# Patient Record
Sex: FEMALE | Race: WHITE | NOT HISPANIC OR LATINO | Employment: FULL TIME | URBAN - METROPOLITAN AREA
[De-identification: names, ages, dates, MRNs, and addresses within clinical notes are randomized per-mention and may not be internally consistent; named-entity substitution may affect disease eponyms.]

---

## 2018-03-07 ENCOUNTER — OFFICE VISIT (OUTPATIENT)
Dept: OBGYN | Facility: CLINIC | Age: 38
End: 2018-03-07
Payer: COMMERCIAL

## 2018-03-07 VITALS
HEIGHT: 64 IN | WEIGHT: 163.3 LBS | SYSTOLIC BLOOD PRESSURE: 101 MMHG | DIASTOLIC BLOOD PRESSURE: 70 MMHG | HEART RATE: 72 BPM | BODY MASS INDEX: 27.88 KG/M2

## 2018-03-07 DIAGNOSIS — Z31.69 ENCOUNTER FOR PRECONCEPTION CONSULTATION: Primary | ICD-10-CM

## 2018-03-07 PROBLEM — L40.9 PSORIASIS: Status: ACTIVE | Noted: 2018-03-07

## 2018-03-07 PROCEDURE — 25000128 H RX IP 250 OP 636: Mod: ZF

## 2018-03-07 PROCEDURE — 86787 VARICELLA-ZOSTER ANTIBODY: CPT | Performed by: OBSTETRICS & GYNECOLOGY

## 2018-03-07 PROCEDURE — 36415 COLL VENOUS BLD VENIPUNCTURE: CPT | Performed by: OBSTETRICS & GYNECOLOGY

## 2018-03-07 PROCEDURE — G0008 ADMIN INFLUENZA VIRUS VAC: HCPCS | Mod: ZF

## 2018-03-07 PROCEDURE — 90686 IIV4 VACC NO PRSV 0.5 ML IM: CPT | Mod: ZF

## 2018-03-07 PROCEDURE — 86762 RUBELLA ANTIBODY: CPT | Performed by: OBSTETRICS & GYNECOLOGY

## 2018-03-07 PROCEDURE — G0463 HOSPITAL OUTPT CLINIC VISIT: HCPCS | Mod: ZF

## 2018-03-07 RX ORDER — IBUPROFEN 200 MG
200 TABLET ORAL
COMMUNITY
Start: 2016-03-31 | End: 2018-10-30

## 2018-03-07 RX ORDER — PIMECROLIMUS 10 MG/G
CREAM TOPICAL
COMMUNITY
Start: 2017-08-10 | End: 2018-10-30

## 2018-03-07 NOTE — NURSING NOTE
Ana Cleveland      1.  Has the patient received the information for the influenza vaccine? YES    2.  Does the patient have any of the following contraindications?     Allergy to eggs? No     Allergic reaction to previous influenza vaccines? No     Any other problems to previous influenza vaccines? No     Paralyzed by Guillain-[place] syndrome? No     Currently pregnant? NO     Current moderate or severe illness? No     Allergy to contact lens solution? No    3.  The vaccine has been administered in the usual fashion and the patient was instructed to wait 20 minutes before leaving the building in the event of an allergic reaction: YES    Vaccination given by See BUCK Ortiz.  Recorded by See Angel

## 2018-03-07 NOTE — NURSING NOTE
Chief Complaint   Patient presents with     Consult For     Pre conception counseling       See BUCK Ortiz 3/7/2018

## 2018-03-07 NOTE — PATIENT INSTRUCTIONS
Can continue to use Cerave    Recommend lab testing for rubella and chicken pox testing    Return within 6 months if not pregnant, Otherwise, call clinic to schedule intake appointment with a positive pregnancy test (~7-8 weeks )

## 2018-03-07 NOTE — MR AVS SNAPSHOT
"              After Visit Summary   3/7/2018    Ana Cleveland    MRN: [id number]           Patient Information     Date Of Birth          1980        Visit Information        Provider Department      3/7/2018 1:30 PM Shanwa Rock MD [facility]        Today's Diagnoses     Encounter for preconception consultation    -  1      Care Instructions    Can continue to use Cerave    Recommend lab testing for rubella and chicken pox testing    Return within 6 months if not pregnant, Otherwise, call clinic to schedule intake appointment with a positive pregnancy test (~7-8 weeks )          Follow-ups after your visit        Who to contact     Please call your clinic at [phone] to:    Ask questions about your health    Make or cancel appointments    Discuss your medicines    Learn about your test results    Speak to your doctor            Additional Information About Your Visit        MyChart Information     CumuLogic is an electronic gateway that provides easy, online access to your medical records. With CumuLogic, you can request a clinic appointment, read your test results, renew a prescription or communicate with your care team.     To sign up for Mundit visit the website at [email or web address]   You will be asked to enter the access code listed below, as well as some personal information. Please follow the directions to create your username and password.     Your access code is: [email or web address]  Expires: 2018  6:30 AM     Your access code will  in 90 days. If you need help or a new code, please contact your [facility] or call [phone] for assistance.        Care EveryWhere ID     This is your Care EveryWhere ID. This could be used by other organizations to access your [facility] medical records  [id number]        Your Vitals Were     Pulse Height Last Period Breastfeeding? BMI (Body Mass Index)       72 1.626 m (5' 4\") 2018 " (Exact Date) No 28.03 kg/m2        Blood Pressure from Last 3 Encounters:   03/07/18 101/70    Weight from Last 3 Encounters:   03/07/18 74.1 kg (163 lb 4.8 oz)              We Performed the Following     Rubella Antibody IgG Quantitative     Varicella Zoster Virus Antibody IgG        Primary Care Provider    None Specified       No primary provider on file.        Equal Access to Services     [facility] : Hadii aad ku hadbonnieo Soangelaali, waaxda luqadaha, qaybta kaalmada stephane, shailesh justinjhonstephany jerez . So [facility] [phone].    ATENCIÓN: Si habla español, tiene a hoang disposición servicios gratuitos de asistencia lingüística. Llame al [phone].    We comply with applicable federal civil rights laws and [place] laws. We do not discriminate on the basis of race, color, national origin, age, disability, sex, sexual orientation, or gender identity.            Thank you!     Thank you for choosing [facility]  for your care. Our goal is always to provide you with excellent care. Hearing back from our patients is one way we can continue to improve our services. Please take a few minutes to complete the written survey that you may receive in the mail after your visit with us. Thank you!             Your Updated Medication List - Protect others around you: Learn how to safely use, store and throw away your medicines at www.disposemymeds.org.          This list is accurate as of 3/7/18  2:15 PM.  Always use your most recent med list.                   Brand Name Dispense Instructions for use Diagnosis    CERAVE Oint           ibuprofen 200 MG tablet    ADVIL/MOTRIN     Take 200 mg by mouth        pimecrolimus 1 % cream    ELIDEL

## 2018-03-07 NOTE — PROGRESS NOTES
"Preconception counseling visit    Patient name: Ana Cleveland  MRN: [id number]  : 1980    CC: Pre conception discussion    HPI: Ana Cleveland is a 37 year old female G0 who presents for discussion of preconception counseling. She and her current partner, her , have been using the pull out method for the years they have been together. They decided approximately 1.5 weeks ago that they would try for pregnancy.     She otherwise feels well. She notes a medical history that includes psoriasis. She was on medications for it, but is now using an emollient cream and would like to make sure she can still use it    OB:  G0    Gyn:  Chlamydiax1 in college  Sexually active with one partner  Q28 day periods, lasting 6-7 days, uses 5-6 pads on the heaviest day.      is 35 does not work with chemicals and no reason to believe anything has had an effect on fertility.    PMH: Psoriasis    PSH: None    Allergies: None    Meds: Emollient, PNV    Social hx: , , no drug use, occasionally uses ETOH, 1-2 each week, no smoking    Physical exam  Vitals:    18 1332   BP: 101/70   BP Location: Left arm   Patient Position: Chair   Pulse: 72   Weight: 74.1 kg (163 lb 4.8 oz)   Height: 1.626 m (5' 4\")     Gen: comfortable, no distress, well appearing, appropriate    A/P: 37 year old G0 here for discussion of preconception counseling  1. Reinforced patient's understanding of limiting ETOH   2. Discussed ideal intercourse timing for pregnancy and that the patient seems to be ovulating given her moliminal symptoms of bloating and cramping  3. Discussed testing of Varicella and rubella, - [place] [facility]  4. Patient on prenatal vitamins, recommended and received flu shot today.   5.  Will call patient to discuss genetic screening for CF if desired    - Return in 6 months if not pregnant, if pregnant, call the clinic with positive pregnancy test.  Patient was discussed with Dr." Clayton Ferguson MD 3/7/2018 8:43 PM     The Patient was seen in [facility] by DAVID FERGUSON.  I reviewed the history & exam. Assessment and plan were jointly made.    Rosita Arnold MD

## 2018-03-08 LAB
RUBV IGG SERPL IA-ACNC: 37 IU/ML
VZV IGG SER QL IA: 0.6 AI (ref 0–0.8)

## 2018-10-28 ENCOUNTER — HEALTH MAINTENANCE LETTER (OUTPATIENT)
Age: 38
End: 2018-10-28

## 2018-10-30 ENCOUNTER — OFFICE VISIT (OUTPATIENT)
Dept: OBGYN | Facility: CLINIC | Age: 38
End: 2018-10-30
Attending: MIDWIFE
Payer: COMMERCIAL

## 2018-10-30 ENCOUNTER — RADIANT APPOINTMENT (OUTPATIENT)
Dept: ULTRASOUND IMAGING | Facility: CLINIC | Age: 38
End: 2018-10-30
Attending: MIDWIFE
Payer: COMMERCIAL

## 2018-10-30 VITALS
SYSTOLIC BLOOD PRESSURE: 100 MMHG | HEART RATE: 78 BPM | BODY MASS INDEX: 27.38 KG/M2 | WEIGHT: 159.5 LBS | DIASTOLIC BLOOD PRESSURE: 70 MMHG

## 2018-10-30 DIAGNOSIS — Z34.00 SUPERVISION OF NORMAL FIRST PREGNANCY, ANTEPARTUM: ICD-10-CM

## 2018-10-30 DIAGNOSIS — Z34.02 VISIT FOR SUPERVISION OF NORMAL FIRST PREGNANCY IN SECOND TRIMESTER: ICD-10-CM

## 2018-10-30 PROBLEM — L40.9 PSORIASIS: Status: RESOLVED | Noted: 2018-03-07 | Resolved: 2018-10-30

## 2018-10-30 LAB
ABO + RH BLD: NORMAL
ABO + RH BLD: NORMAL
BASOPHILS # BLD AUTO: 0 10E9/L (ref 0–0.2)
BASOPHILS NFR BLD AUTO: 0.1 %
BLD GP AB SCN SERPL QL: NORMAL
BLOOD BANK CMNT PATIENT-IMP: NORMAL
DIFFERENTIAL METHOD BLD: NORMAL
EOSINOPHIL # BLD AUTO: 0.1 10E9/L (ref 0–0.7)
EOSINOPHIL NFR BLD AUTO: 1.2 %
ERYTHROCYTE [DISTWIDTH] IN BLOOD BY AUTOMATED COUNT: 12.3 % (ref 10–15)
HCT VFR BLD AUTO: 39.7 % (ref 35–47)
HGB BLD-MCNC: 13.5 G/DL (ref 11.7–15.7)
IMM GRANULOCYTES # BLD: 0 10E9/L (ref 0–0.4)
IMM GRANULOCYTES NFR BLD: 0.2 %
LYMPHOCYTES # BLD AUTO: 2.7 10E9/L (ref 0.8–5.3)
LYMPHOCYTES NFR BLD AUTO: 28.2 %
MCH RBC QN AUTO: 32.5 PG (ref 26.5–33)
MCHC RBC AUTO-ENTMCNC: 34 G/DL (ref 31.5–36.5)
MCV RBC AUTO: 96 FL (ref 78–100)
MONOCYTES # BLD AUTO: 0.7 10E9/L (ref 0–1.3)
MONOCYTES NFR BLD AUTO: 7.5 %
NEUTROPHILS # BLD AUTO: 5.9 10E9/L (ref 1.6–8.3)
NEUTROPHILS NFR BLD AUTO: 62.8 %
NRBC # BLD AUTO: 0 10*3/UL
NRBC BLD AUTO-RTO: 0 /100
PLATELET # BLD AUTO: 307 10E9/L (ref 150–450)
RBC # BLD AUTO: 4.15 10E12/L (ref 3.8–5.2)
SPECIMEN EXP DATE BLD: NORMAL
WBC # BLD AUTO: 9.4 10E9/L (ref 4–11)

## 2018-10-30 PROCEDURE — G0008 ADMIN INFLUENZA VIRUS VAC: HCPCS | Mod: ZF

## 2018-10-30 PROCEDURE — 86900 BLOOD TYPING SEROLOGIC ABO: CPT | Performed by: MIDWIFE

## 2018-10-30 PROCEDURE — 76817 TRANSVAGINAL US OBSTETRIC: CPT

## 2018-10-30 PROCEDURE — 87389 HIV-1 AG W/HIV-1&-2 AB AG IA: CPT | Performed by: MIDWIFE

## 2018-10-30 PROCEDURE — 25000128 H RX IP 250 OP 636: Mod: ZF

## 2018-10-30 PROCEDURE — G0463 HOSPITAL OUTPT CLINIC VISIT: HCPCS | Mod: 25

## 2018-10-30 PROCEDURE — 86780 TREPONEMA PALLIDUM: CPT | Performed by: MIDWIFE

## 2018-10-30 PROCEDURE — 86762 RUBELLA ANTIBODY: CPT | Performed by: MIDWIFE

## 2018-10-30 PROCEDURE — 87340 HEPATITIS B SURFACE AG IA: CPT | Performed by: MIDWIFE

## 2018-10-30 PROCEDURE — 82306 VITAMIN D 25 HYDROXY: CPT | Performed by: MIDWIFE

## 2018-10-30 PROCEDURE — 85025 COMPLETE CBC W/AUTO DIFF WBC: CPT | Performed by: MIDWIFE

## 2018-10-30 PROCEDURE — 36415 COLL VENOUS BLD VENIPUNCTURE: CPT | Performed by: MIDWIFE

## 2018-10-30 PROCEDURE — 90686 IIV4 VACC NO PRSV 0.5 ML IM: CPT | Mod: ZF

## 2018-10-30 PROCEDURE — 86901 BLOOD TYPING SEROLOGIC RH(D): CPT | Performed by: MIDWIFE

## 2018-10-30 PROCEDURE — 87086 URINE CULTURE/COLONY COUNT: CPT | Performed by: MIDWIFE

## 2018-10-30 PROCEDURE — 86850 RBC ANTIBODY SCREEN: CPT | Performed by: MIDWIFE

## 2018-10-30 RX ORDER — TRIAMCINOLONE ACETONIDE 1 MG/ML
LOTION TOPICAL 3 TIMES DAILY
COMMUNITY
End: 2018-12-12

## 2018-10-30 RX ORDER — PRENATAL VIT/IRON FUM/FOLIC AC 27MG-0.8MG
1 TABLET ORAL DAILY
Qty: 100 TABLET | Refills: 3 | Status: SHIPPED | OUTPATIENT
Start: 2018-10-30 | End: 2022-04-15

## 2018-10-30 ASSESSMENT — PAIN SCALES - GENERAL: PAINLEVEL: NO PAIN (0)

## 2018-10-30 NOTE — NURSING NOTE
Ana Cleveland      1.  Has the patient received the information for the influenza vaccine? YES    2.  Does the patient have any of the following contraindications?     Allergy to eggs? No     Allergic reaction to previous influenza vaccines? No     Any other problems to previous influenza vaccines? No     Paralyzed by Guillain-[place] syndrome? No     Currently pregnant? Yes, 9 weeks gestation.     Current moderate or severe illness? No     Allergy to contact lens solution? No    3.  The vaccine has been administered in the usual fashion and the patient was instructed to wait 20 minutes before leaving the building in the event of an allergic reaction: YES    Vaccination given by Paola Villagomez [facility] 10/30/18.  Recorded by Paola Villagomez

## 2018-10-30 NOTE — NURSING NOTE
Chief Complaint   Patient presents with     Intake     OB intake      Patient Request     Patient is present with spouse. Patient has a few questions about traveling and taking some medication derm related. Whit Pal CMA on 10/30/2018 at 3:04 PM

## 2018-10-30 NOTE — LETTER
Date:November 1, 2018      Patient was self referred, no letter generated. Do not send.        [facility] Information

## 2018-10-30 NOTE — PROGRESS NOTES
[facility] OB Intake note  Subjective   38 year old woman presents to clinic for initiation of OB care.  Patient's last menstrual period was 2018.    at 9w0d by Estimated Date of Delivery: 2019 based on LMP.  Reviewed dating ultrasound. Pregnancy is planned.      Symptoms since LMP include nausea, vomiting, breast tenderness and fatigue.  Patient has tried these relief measures: diet modification, small frequent meals, increased rest and increased fluids.    - Genetic/Infection questionnaire completed, risks include    Have you traveled during the pregnancy?No  Have your sexual partner(s) travelled during the pregnancy?No  - Current Medications PNV      Current Outpatient Prescriptions   Medication Sig Dispense Refill     triamcinolone (KENALOG) 0.1 % lotion Apply topically 3 times daily       UNABLE TO FIND IS Clinical Pro-Hil Serum daily   Epionce Face Cream daily               pimecrolimus (ELIDEL) 1 % cream        Skin Protectants, Misc. (CERAVE) OINT            - Co-morbids none  Past Medical History:   Diagnosis Date     Psoriasis      - Risk for GDM -  does not Personal history of GDM, BMI>30, h/o prediabetes/glucose intolerance, first degree relative with GDM or DM    WILL NOT have an early GCT and possible Hgb A1C    - The patient does not h/o Pre Eclampsia, Current multi fetal gestation, Pre Gestational Diabetes (Type 1 or Type 2), chronic hypertension, renal disease, Autoimmune disease (systematic lupus erythematosus, antiphospholipid syndrome) so WILL NOT start low dose aspirin (81mg) starting between 12 and 28 weeks to prevent preeclampsia.    - The patient  does not have a history of spontaneous  birth so  WILL NOT consider progesterone starting at 16-20 weeks and/or serial transvaginal cervical length ultrasounds from 16-24 weeks.         PERSONAL/SOCIAL HISTORY    lives with their spouse.  Employment: Full time.  Her job involves light activity .  Additional items:  None    Objective  -VS: reviewed and within normal limits   -General appearance: no acute distress, patient is comfortable   NEUROLOGICAL/PSYCHIATRIC   - Orientated x3,   -Mood and affect: : normal     Assessment/Plan  Ana was seen today for intake and patient request.    Diagnoses and all orders for this visit:    Visit for supervision of normal first pregnancy   -     25- OH-Vitamin D  -     ABO/Rh Type and Screen  -     CBC with Platelets Differential  -     Hepatitis B Surface Antigen  -     Treponema Abs w Reflex to RPR and Titer  -     Urine Culture Aerobic Bacterial  -     Rubella Antibody IgG Quantitative  -     HIV Antigen Antibody Combo        38 year old  9w0d weeks of pregnancy with ADDIE of 2019 by LMP of Patient's last menstrual period was 2018.. Ultrasound confirms.   Outpatient Encounter Prescriptions as of 10/30/2018   Medication Sig Dispense Refill     triamcinolone (KENALOG) 0.1 % lotion Apply topically 3 times daily       UNABLE TO FIND IS Clinical Pro-Hil Serum daily   Epionce Face Cream daily       ibuprofen (ADVIL/MOTRIN) 200 MG tablet Take 200 mg by mouth       pimecrolimus (ELIDEL) 1 % cream        Skin Protectants, Misc. (CERAVE) OINT        No facility-administered encounter medications on file as of 10/30/2018.       Orders Placed This Encounter   Procedures     25- OH-Vitamin D     CBC with Platelets Differential     Hepatitis B Surface Antigen     Treponema Abs w Reflex to RPR and Titer     Rubella Antibody IgG Quantitative     HIV Antigen Antibody Combo     ABO/Rh Type and Screen                   Orders Placed This Encounter   Procedures     25- OH-Vitamin D     CBC with Platelets Differential     Hepatitis B Surface Antigen     Treponema Abs w Reflex to RPR and Titer     Rubella Antibody IgG Quantitative     HIV Antigen Antibody Combo     ABO/Rh Type and Screen           - Oriented to Practice, types of care, and how to reach a provider.  Pt prefers CNM .  -  Patient received 1st trimester new OB education packet complete with aide of The Expectant Family booklet including information on genetic screening test options.  - Patient desires 1st trimester screening which was ordered.  - Patient was encouraged to start prenatal vitamins as tolerated.    - Patient was sent to lab for routine OB labs including routine .     - Pregnancy concerns to be addressed by provider at new OB exam include: none.    Pt to RTO for NOB visit in 3 weeks and prn if questions or concerns    ASHLEY Lopez CNM

## 2018-10-30 NOTE — PATIENT INSTRUCTIONS
Thank you for choosing [facility] for your obstetrical care.  We are an integrated health clinic with obstetricians, midwives, a psychologist, an acupuncturist, massage, a nutritionist,a pharmacist, internal medicine and family practice all in one clinic.  If you have questions about services offered please ask.    Keep all appointments with your provider.  You will help catch, prevent and treat problems early.    Review your Expectant Family booklet given to you at this intake visit.  It can answer many basic questions including:    Treatment for nausea and vomiting p.22    Medications that are safe in pregnancy (see handout)    Healthy diet and weight gain p.7-8    Exercise and activity p.9    ASK questions!!  Please write down any questions or concerns for your next visit.    Eat a healthy diet.  Visit www.choosemyplate.gov and click on Pregnancy and Breastfeeding for information and tips    Do not smoke.  Avoid other people's smoke, too.  We are happy to help with referrals to stop smoking programs.    Do not drink alcohol.    Try to avoid people who have colds or other infections.  Practice good hand washing.    Consider registering for prenatal education classes through [employer] at  [facility] [place].  You can view class schedules and register online at [email or web address] or call [phone] for questions    For urgent concerns call [facility] at [phone]  to talk with a triage nurse during regular clinic hours (8am-4:30pm).  This line is answered by our service 24 hours a day, after hours a provider will return your call.

## 2018-10-30 NOTE — MR AVS SNAPSHOT
After Visit Summary   10/30/2018    Ana Cleveland    MRN: [id number]           Patient Information     Date Of Birth          1980        Visit Information        Provider Department      10/30/2018 3:30 PM Victoria Delacruz APRN CNM [facility]        Today's Diagnoses     Visit for supervision of normal first pregnancy           Care Instructions      Thank you for choosing [facility] for your obstetrical care.  We are an integrated health clinic with obstetricians, midwives, a psychologist, an acupuncturist, massage, a nutritionist,a pharmacist, internal medicine and family practice all in one clinic.  If you have questions about services offered please ask.    Keep all appointments with your provider.  You will help catch, prevent and treat problems early.    Review your Expectant Family booklet given to you at this intake visit.  It can answer many basic questions including:    Treatment for nausea and vomiting p.22    Medications that are safe in pregnancy (see handout)    Healthy diet and weight gain p.7-8    Exercise and activity p.9    ASK questions!!  Please write down any questions or concerns for your next visit.    Eat a healthy diet.  Visit www.choosemyplate.gov and click on Pregnancy and Breastfeeding for information and tips    Do not smoke.  Avoid other people's smoke, too.  We are happy to help with referrals to stop smoking programs.    Do not drink alcohol.    Try to avoid people who have colds or other infections.  Practice good hand washing.    Consider registering for prenatal education classes through [employer] at  [facility].  You can view class schedules and register online at [email or web address] or call [phone] for questions    For urgent concerns call [facility] at [phone]  to talk with a triage nurse during regular clinic hours (8am-4:30pm).  This line is answered by our service 24 hours a day,  after hours a provider will return your call.            Follow-ups after your visit        Follow-up notes from your care team     Return in about 3 weeks (around 11/20/2018) for New OB.      Your next 10 appointments already scheduled     Nov 20, 2018  3:30 PM CST   NEW OB with Luisa Velázquez CNM   [facility] ([facility])    [facility] [address]  [address]  [facility] [id number] [phone]              Who to contact     Please call your clinic at [phone] to:    Ask questions about your health    Make or cancel appointments    Discuss your medicines    Learn about your test results    Speak to your doctor            Additional Information About Your Visit        My 1%harSuperfish Information     Sinobpo gives you secure access to your electronic health record. If you see a primary care provider, you can also send messages to your care team and make appointments. If you have questions, please call your primary care clinic.  If you do not have a primary care provider, please call [phone] and they will assist you.      Sinobpo is an electronic gateway that provides easy, online access to your medical records. With Sinobpo, you can request a clinic appointment, read your test results, renew a prescription or communicate with your care team.     To access your existing account, please contact your [facility] or call [phone] for assistance.        Care EveryWhere ID     This is your Care EveryWhere ID. This could be used by other organizations to access your [facility] medical records  [id number]        Your Vitals Were     Pulse Last Period BMI (Body Mass Index)             78 08/31/2018 27.38 kg/m2          Blood Pressure from Last 3 Encounters:   10/30/18 100/70   03/07/18 101/70    Weight from Last 3 Encounters:   10/30/18 72.3 kg (159 lb 8 oz)   03/07/18 74.1 kg (163 lb 4.8 oz)               We Performed the Following     25- OH-Vitamin D     ABO/Rh Type and Screen     CBC with Platelets Differential     HC FLU VAC PRESRV FREE QUAD SPLIT VIR 3+YRS IM     Hepatitis B Surface Antigen     HIV Antigen Antibody Combo     Rubella Antibody IgG Quantitative     Treponema Abs w Reflex to RPR and Titer     Urine Culture Aerobic Bacterial          Today's Medication Changes          These changes are accurate as of 10/30/18 11:59 PM.  If you have any questions, ask your nurse or doctor.               Start taking these medicines.        Dose/Directions    prenatal multivitamin plus iron 27-0.8 MG Tabs per tablet   Used for:  Visit for supervision of normal first pregnancy in second trimester   Started by:  Victoria Delacruz APRN CNM        Dose:  1 tablet   Take 1 tablet by mouth daily   Quantity:  100 tablet   Refills:  3            Where to get your medicines      Some of these will need a paper prescription and others can be bought over the counter.  Ask your nurse if you have questions.     Bring a paper prescription for each of these medications     prenatal multivitamin plus iron 27-0.8 MG Tabs per tablet                Primary Care Provider    None Specified       No primary provider on file.        Equal Access to Services     [facility] : Hadsantino mauriceo Soalix, waaxda luqadaha, qaybta kaalmada adenay, shailesh jerez . So [facility] [phone].    ATENCIÓN: Si habla español, tiene a hoang disposición servicios gratuitos de asistencia lingüística. Llame al [phone].    We comply with applicable federal civil rights laws and [place] laws. We do not discriminate on the basis of race, color, national origin, age, disability, sex, sexual orientation, or gender identity.            Thank you!     Thank you for choosing [facility]  for your care. Our goal is always to provide you with excellent care. Hearing back from our patients is one way we can  continue to improve our services. Please take a few minutes to complete the written survey that you may receive in the mail after your visit with us. Thank you!             Your Updated Medication List - Protect others around you: Learn how to safely use, store and throw away your medicines at www.disposemymeds.org.          This list is accurate as of 10/30/18 11:59 PM.  Always use your most recent med list.                   Brand Name Dispense Instructions for use Diagnosis    CERAVE Oint           prenatal multivitamin plus iron 27-0.8 MG Tabs per tablet     100 tablet    Take 1 tablet by mouth daily    Visit for supervision of normal first pregnancy in second trimester       triamcinolone 0.1 % lotion    KENALOG     Apply topically 3 times daily    Visit for supervision of normal first pregnancy in second trimester       UNABLE TO FIND      IS Clinical Pro-Hil Serum daily  Epionce Face Cream daily    Visit for supervision of normal first pregnancy in second trimester

## 2018-10-30 NOTE — LETTER
10/30/2018       RE: Ana Cleveland  [address]  [facility] [address]     Dear Colleague,    Thank you for referring your patient, Ana Cleveland, to the [facility] at [facility]. Please see a copy of my visit note below.    [facility] OB Intake note  Subjective   38 year old woman presents to clinic for initiation of OB care.  Patient's last menstrual period was 2018.    at 9w0d by Estimated Date of Delivery: 2019 based on LMP.  Reviewed dating ultrasound. Pregnancy is planned.      Symptoms since LMP include nausea, vomiting, breast tenderness and fatigue.  Patient has tried these relief measures: diet modification, small frequent meals, increased rest and increased fluids.    - Genetic/Infection questionnaire completed, risks include    Have you traveled during the pregnancy?No  Have your sexual partner(s) travelled during the pregnancy?No  - Current Medications PNV      Current Outpatient Prescriptions   Medication Sig Dispense Refill     triamcinolone (KENALOG) 0.1 % lotion Apply topically 3 times daily       UNABLE TO FIND IS Clinical Pro-Hil Serum daily   Epionce Face Cream daily               pimecrolimus (ELIDEL) 1 % cream        Skin Protectants, Misc. (CERAVE) OINT            - Co-morbids none  Past Medical History:   Diagnosis Date     Psoriasis      - Risk for GDM -  does not Personal history of GDM, BMI>30, h/o prediabetes/glucose intolerance, first degree relative with GDM or DM    WILL NOT have an early GCT and possible Hgb A1C    - The patient does not h/o Pre Eclampsia, Current multi fetal gestation, Pre Gestational Diabetes (Type 1 or Type 2), chronic hypertension, renal disease, Autoimmune disease (systematic lupus erythematosus, antiphospholipid syndrome) so WILL NOT start low dose aspirin (81mg) starting between 12 and 28 weeks to prevent preeclampsia.    - The patient  does not have a history of spontaneous   birth so  WILL NOT consider progesterone starting at 16-20 weeks and/or serial transvaginal cervical length ultrasounds from 16-24 weeks.         PERSONAL/SOCIAL HISTORY    lives with their spouse.  Employment: Full time.  Her job involves light activity .  Additional items: None    Objective  -VS: reviewed and within normal limits   -General appearance: no acute distress, patient is comfortable   NEUROLOGICAL/PSYCHIATRIC   - Orientated x3,   -Mood and affect: : normal     Assessment/Plan  Ana was seen today for intake and patient request.    Diagnoses and all orders for this visit:    Visit for supervision of normal first pregnancy   -     25- OH-Vitamin D  -     ABO/Rh Type and Screen  -     CBC with Platelets Differential  -     Hepatitis B Surface Antigen  -     Treponema Abs w Reflex to RPR and Titer  -     Urine Culture Aerobic Bacterial  -     Rubella Antibody IgG Quantitative  -     HIV Antigen Antibody Combo        38 year old  9w0d weeks of pregnancy with ADDIE of 2019 by LMP of Patient's last menstrual period was 2018.. Ultrasound confirms.   Outpatient Encounter Prescriptions as of 10/30/2018   Medication Sig Dispense Refill     triamcinolone (KENALOG) 0.1 % lotion Apply topically 3 times daily       UNABLE TO FIND IS Clinical Pro-Hil Serum daily   Epionce Face Cream daily       ibuprofen (ADVIL/MOTRIN) 200 MG tablet Take 200 mg by mouth       pimecrolimus (ELIDEL) 1 % cream        Skin Protectants, Misc. (CERAVE) OINT        No facility-administered encounter medications on file as of 10/30/2018.       Orders Placed This Encounter   Procedures     25- OH-Vitamin D     CBC with Platelets Differential     Hepatitis B Surface Antigen     Treponema Abs w Reflex to RPR and Titer     Rubella Antibody IgG Quantitative     HIV Antigen Antibody Combo     ABO/Rh Type and Screen                   Orders Placed This Encounter   Procedures     25- OH-Vitamin D     CBC with Platelets  Differential     Hepatitis B Surface Antigen     Treponema Abs w Reflex to RPR and Titer     Rubella Antibody IgG Quantitative     HIV Antigen Antibody Combo     ABO/Rh Type and Screen           - Oriented to Practice, types of care, and how to reach a provider.  Pt prefers CNM .  - Patient received 1st trimester new OB education packet complete with aide of [facility] Expectant [facility] booklet including information on genetic screening test options.  - Patient desires 1st trimester screening which was ordered.  - Patient was encouraged to start prenatal vitamins as tolerated.    - Patient was sent to lab for routine OB labs including routine .     - Pregnancy concerns to be addressed by provider at new OB exam include: none.    Pt to RTO for NOB visit in 3 weeks and prn if questions or concerns    ASHLEY Lopez CNM          Again, thank you for allowing me to participate in the care of your patient.      Sincerely,    ASHLEY Lopez CNM

## 2018-10-31 DIAGNOSIS — Z34.91 SUPERVISION OF NORMAL PREGNANCY IN FIRST TRIMESTER: Primary | ICD-10-CM

## 2018-10-31 LAB
BACTERIA SPEC CULT: NO GROWTH
DEPRECATED CALCIDIOL+CALCIFEROL SERPL-MC: 28 UG/L (ref 20–75)
HBV SURFACE AG SERPL QL IA: NONREACTIVE
HIV 1+2 AB+HIV1 P24 AG SERPL QL IA: NONREACTIVE
Lab: NORMAL
SPECIMEN SOURCE: NORMAL

## 2018-11-01 LAB
RUBV IGG SERPL IA-ACNC: 38 IU/ML
T PALLIDUM AB SER QL: NONREACTIVE

## 2018-11-20 ENCOUNTER — OFFICE VISIT (OUTPATIENT)
Dept: OBGYN | Facility: CLINIC | Age: 38
End: 2018-11-20
Attending: ADVANCED PRACTICE MIDWIFE
Payer: COMMERCIAL

## 2018-11-20 VITALS
SYSTOLIC BLOOD PRESSURE: 105 MMHG | HEIGHT: 64 IN | HEART RATE: 76 BPM | BODY MASS INDEX: 27.39 KG/M2 | WEIGHT: 160.4 LBS | DIASTOLIC BLOOD PRESSURE: 71 MMHG

## 2018-11-20 DIAGNOSIS — Z34.02 VISIT FOR SUPERVISION OF NORMAL FIRST PREGNANCY IN SECOND TRIMESTER: Primary | ICD-10-CM

## 2018-11-20 PROCEDURE — G0463 HOSPITAL OUTPT CLINIC VISIT: HCPCS | Mod: ZF

## 2018-11-20 ASSESSMENT — PATIENT HEALTH QUESTIONNAIRE - PHQ9
5. POOR APPETITE OR OVEREATING: NOT AT ALL
SUM OF ALL RESPONSES TO PHQ QUESTIONS 1-9: 3

## 2018-11-20 ASSESSMENT — ANXIETY QUESTIONNAIRES
5. BEING SO RESTLESS THAT IT IS HARD TO SIT STILL: NOT AT ALL
2. NOT BEING ABLE TO STOP OR CONTROL WORRYING: NOT AT ALL
3. WORRYING TOO MUCH ABOUT DIFFERENT THINGS: NOT AT ALL
GAD7 TOTAL SCORE: 0
6. BECOMING EASILY ANNOYED OR IRRITABLE: NOT AT ALL
7. FEELING AFRAID AS IF SOMETHING AWFUL MIGHT HAPPEN: NOT AT ALL
1. FEELING NERVOUS, ANXIOUS, OR ON EDGE: NOT AT ALL

## 2018-11-20 ASSESSMENT — PAIN SCALES - GENERAL: PAINLEVEL: NO PAIN (0)

## 2018-11-20 NOTE — MR AVS SNAPSHOT
After Visit Summary   11/20/2018    Ana Cleveland    MRN: [id number]           Patient Information     Date Of Birth          1980        Visit Information        Provider Department      11/20/2018 3:30 PM Luisa Velázquez CNM [facility]        Today's Diagnoses     Visit for supervision of normal first pregnancy     -  1       Follow-ups after your visit        Follow-up notes from your care team     Return in about 4 weeks (around 12/18/2018) for CHARISSA NAVAS.      Your next 10 appointments already scheduled     Nov 28, 2018 10:15 AM CST   Genetic Counseling with ALICIA GEN COUNSELOR 1   [facility] [place] ([facility])    [address]  [facility] [address]   [phone]            Nov 28, 2018 11:00 AM CST   MFM NUCHAL TRANSLUCENCY with [id number]   [facility] Ultrasound - [place] ([facility])    [address]  [facility] [address] [phone]            Nov 28, 2018 11:30 AM CST   Radiology MD with ALICIA MCALLISTER MD   [facility] [place] ([facility])    [address]  [facility] [address]   [phone]           Please arrive at the time given for your first appointment. This visit is used internally to schedule the physician's time during your ultrasound.            Dec 27, 2018  9:30 AM CST   RETURN OB with ASHLEY Vazquez CNM   [facility] ([facility])    [place] [facility]  [address]  [address]  [facility] [address] [phone]              Who to contact     Please call your clinic at [phone] to:    Ask questions about your health    Make or cancel appointments    Discuss your medicines    Learn about your test results    Speak to your doctor            Additional Information About  "Your Visit        MyChart Information     NERITES gives you secure access to your electronic health record. If you see a primary care provider, you can also send messages to your care team and make appointments. If you have questions, please call your primary care clinic.  If you do not have a primary care provider, please call [phone] and they will assist you.      NERITES is an electronic gateway that provides easy, online access to your medical records. With [employer]IT[employer], you can request a clinic appointment, read your test results, renew a prescription or communicate with your care team.     To access your existing account, please contact your [facility] or call [phone] for assistance.        Care EveryWhere ID     This is your Care EveryWhere ID. This could be used by other organizations to access your [facility] medical records  [id number]        Your Vitals Were     Pulse Height Last Period BMI (Body Mass Index)          76 1.626 m (5' 4\") 08/31/2018 (Exact Date) 27.53 kg/m2         Blood Pressure from Last 3 Encounters:   11/20/18 105/71   10/30/18 100/70   03/07/18 101/70    Weight from Last 3 Encounters:   11/20/18 72.8 kg (160 lb 6.4 oz)   10/30/18 72.3 kg (159 lb 8 oz)   03/07/18 74.1 kg (163 lb 4.8 oz)              Today, you had the following     No orders found for display       Primary Care Provider    None Specified       No primary provider on file.        Equal Access to Services     [facility] : Hadii debbie mauriceo [employer], waaxda luqadaha, qaybta kaalmada adebayda, shailesh jerez . So [facility] [phone].    ATENCIÓN: Si habla español, tiene a hoang disposición servicios gratuitos de asistencia lingüística. Llame al [phone].    We comply with applicable federal civil rights laws and [place] laws. We do not discriminate on the basis of race, color, national origin, age, disability, sex, sexual orientation, or gender " identity.            Thank you!     Thank you for choosing [facility]  for your care. Our goal is always to provide you with excellent care. Hearing back from our patients is one way we can continue to improve our services. Please take a few minutes to complete the written survey that you may receive in the mail after your visit with us. Thank you!             Your Updated Medication List - Protect others around you: Learn how to safely use, store and throw away your medicines at www.disposemymeds.org.          This list is accurate as of 11/20/18 11:59 PM.  Always use your most recent med list.                   Brand Name Dispense Instructions for use Diagnosis    CERAVE Oint           Cholecalciferol 4000 units Caps           prenatal multivitamin plus iron 27-0.8 MG Tabs per tablet     100 tablet    Take 1 tablet by mouth daily    Visit for supervision of normal first pregnancy in second trimester       triamcinolone 0.1 % lotion    KENALOG     Apply topically 3 times daily    Visit for supervision of normal first pregnancy in second trimester       UNABLE TO FIND      IS Clinical Pro-Hil Serum daily  Epionce Face Cream daily    Visit for supervision of normal first pregnancy in second trimester

## 2018-11-20 NOTE — PROGRESS NOTES
SUBJECTIVE:   Ana is a 38 year old female who presents to clinic for a new OB visit.   at 11w4d with Estimated Date of Delivery: 2019 based on LMP, c/w dating ultrasound. Feels well. Has started PNV.     She has not had bleeding since her LMP.   She has had mild nausea. Weight loss has not occurred.   This was a planned pregnancy.   FOB is involved,   Bertin.    OTHER CONCERNS: ADDIE changed to 19 from 19. Error noted in which ADDIE was chosen. Appropriate to keep ADDIE from LMP.    ===========================================   ROS: 10 point ROS neg other than the symptoms noted above in the HPI.    PSYCHIATRIC:  Denies mood changes, difficulty concentrating, depression, anxiety, panic attacks or post partum depression  PHQ-9 score:    PHQ-9 SCORE 2018   Total Score 3     LAURIE-7 SCORE 2018   Total Score 0         Past History:  Her past medical history   Past Medical History:   Diagnosis Date     Fibroadenoma of breast, left     bx  benign has clips in place      Psoriasis    .   This is her first pregnancy  Since her last LMP she denies use of alcohol, tobacco and street drugs.  HISTORY:  Family History   Problem Relation Age of Onset     Skin Cancer Mother      basal, squamous cell      Skin Cancer Father      melanoma , squamous cell and basal cell      Skin Cancer Maternal Grandmother      Skin Cancer Maternal Grandfather      Skin Cancer Paternal Grandmother      Glaucoma Brother      dx  at birth  glaucoma  Sturge Garrett syndrome        Neurofibromatosis Nephew      dx as a baby  brain tumors surgery age 3      Other - See Comments Niece 11     Graves Diesase     Social History     Social History     Marital status:      Spouse name: Bertin      Number of children: 0     Years of education: 16     Occupational History            [employer]      Social History Main Topics     Smoking status: Never Smoker     Smokeless tobacco: Never Used     Alcohol use  "Yes      Comment: 1 drink/wk     Drug use: No     Sexual activity: Yes     Partners: Male     Birth control/ protection: None     Other Topics Concern     None     Social History Narrative    Exercise 30min walking daily     Kettle bell weights     Caffeine      Current Outpatient Prescriptions   Medication Sig     Cholecalciferol 4000 units CAPS      Prenatal Vit-Fe Fumarate-FA (PRENATAL MULTIVITAMIN PLUS IRON) 27-0.8 MG TABS per tablet Take 1 tablet by mouth daily     Skin Protectants, Misc. (CERAVE) OINT      triamcinolone (KENALOG) 0.1 % lotion Apply topically 3 times daily     UNABLE TO FIND IS Clinical Pro-Hil Serum daily   Epionce Face Cream daily     No current facility-administered medications for this visit.      No Known Allergies    ============================================  MEDICAL HISTORY  Past Medical History:   Diagnosis Date     Fibroadenoma of breast, left     bx  benign has clips in place      Psoriasis      Past Surgical History:   Procedure Laterality Date     DENTAL SURGERY      [place] teeth     US BREAST BIOPSY LT         Obstetric History       T0      L0     SAB0   TAB0   Ectopic0   Multiple0   Live Births0       # Outcome Date GA Lbr Mauro/2nd Weight Sex Delivery Anes PTL Lv   1 Current                     GYN History- Last pap: 3/31/2016 NIL, negative HPV     Abnormal Pap Smears: none                        Cervical procedures: none                        History of STI: none    I personally reviewed the past social/family/medical and surgical history on the date of service.   I reviewed lab work done at Intake visit with patient.    EXAM:  /71  Pulse 76  Ht 1.626 m (5' 4\")  Wt 72.8 kg (160 lb 6.4 oz)  LMP 2018  BMI 27.53 kg/m2   EXAM:  GENERAL:  Pleasant pregnant female, alert, cooperative and well groomed.  SKIN:  Warm and dry, without lesions or rashes  HEAD: Symmetrical features.  MOUTH:  Buccal mucosa pink, moist without lesions.  Teeth in " good repair.    NECK:  Thyroid without enlargement and nodules.  Lymph nodes not palpable.   LUNGS:  Clear to auscultation.  BREAST:    No dominant, fixed or suspicious masses are noted.  No skin or nipple changes or axillary nodes.   Nipples everted.      HEART:  RRR without murmur.  ABDOMEN: Soft without masses , tenderness or organomegaly.  No CVA tenderness.  Uterus palpable at size equal to dates.  No scars noted.. Fetal heart tones present.  MUSCULOSKELETAL:  Full range of motion  EXTREMITIES:  No edema. No significant varicosities.   PELVIC EXAM: deferred  WET PREP:Not done  GC/CHLAMYDIA CULTURE OBTAINED:YES    Pap NIL, negative HPV 3/31/2016       ASSESSMENT:  38 year old , 12w0d weeks of pregnancy with ADDIE of 2019 by LMP  Intrauterine pregnancy 12w0d size consistent with dates  Genetic Screening: First Trimester Screen    ICD-10-CM    1. Visit for supervision of normal first pregnancy  Z34.02 Chlamydia PCR (Clinic Collect)     Gonorrhea PCR       PLAN:  - Reviewed use of triage nurse line and contacting the on-call provider after hours for an urgent need such as fever, vagina bleeding, bladder or vaginal infection, rupture of membranes,  or term labor.    - Reviewed best evidence for: weight gain for her weight and height for pregnancy:  Based on pre-pregnancy weight and Body mass index is 27.53 kg/(m^2).  - Reviewed healthy diet and foods to avoid; exercise and activity during pregnancy; avoiding exposure to toxoplasmosis; and maintenance of a generally healthy lifestyle.   - Discussed the harms, benefits, side effects and alternative therapies for current prescribed and OTC medications.    - All pt's questions discussed and answered.  Pt verbalized understanding of and agreement to plan of care.     - OBI labs reviewed.   - Discussed varicella nonimmune, offer vaccine postpartum.  - GC/CT collected.  - Pap with hpv cotesting up to date, due 3/2021.  - 1st trimester screening scheduled  for 11/28/18.    - Continue scheduled prenatal care, RTC in 4 weeks and prn if questions or concerns    ASHLEY Pitts, ITALOM

## 2018-11-20 NOTE — LETTER
2018       RE: Ana Cleveland  [address]  [facility] [address]     Dear Colleague,    Thank you for referring your patient, Ana Cleveland, to the [facility] at [facility]. Please see a copy of my visit note below.    SUBJECTIVE:   Ana is a 38 year old female who presents to clinic for a new OB visit.   at 11w4d with Estimated Date of Delivery: 2019 based on LMP, c/w dating ultrasound. Feels well. Has started PNV.     She has not had bleeding since her LMP.   She has had mild nausea. Weight loss has not occurred.   This was a planned pregnancy.   FOB is involved,   Bertin.    OTHER CONCERNS: ADDIE changed to 19 from 19. Error noted in which ADDIE was chosen. Appropriate to keep ADDIE from LMP.    ===========================================   ROS: 10 point ROS neg other than the symptoms noted above in the HPI.    PSYCHIATRIC:  Denies mood changes, difficulty concentrating, depression, anxiety, panic attacks or post partum depression  PHQ-9 score:    PHQ-9 SCORE 2018   Total Score 3     LAURIE-7 SCORE 2018   Total Score 0         Past History:  Her past medical history   Past Medical History:   Diagnosis Date     Fibroadenoma of breast, left     bx  benign has clips in place      Psoriasis    .   This is her first pregnancy  Since her last LMP she denies use of alcohol, tobacco and street drugs.  HISTORY:  Family History   Problem Relation Age of Onset     Skin Cancer Mother      basal, squamous cell      Skin Cancer Father      melanoma , squamous cell and basal cell      Skin Cancer Maternal Grandmother      Skin Cancer Maternal Grandfather      Skin Cancer Paternal Grandmother      Glaucoma Brother      dx  at birth  glaucoma  Sturge [place] syndrome        Neurofibromatosis Nephew      dx as a baby  brain tumors surgery age 3      Other - See Comments Niece 11     Graves Diesase     Social History     Social  History     Marital status:      Spouse name: Bertin      Number of children: 0     Years of education: 16     Occupational History            [employer]      Social History Main Topics     Smoking status: Never Smoker     Smokeless tobacco: Never Used     Alcohol use Yes      Comment: 1 drink/wk     Drug use: No     Sexual activity: Yes     Partners: Male     Birth control/ protection: None     Other Topics Concern     None     Social History Narrative    Exercise 30min walking daily     Kettle bell weights     Caffeine      Current Outpatient Prescriptions   Medication Sig     Cholecalciferol 4000 units CAPS      Prenatal Vit-Fe Fumarate-FA (PRENATAL MULTIVITAMIN PLUS IRON) 27-0.8 MG TABS per tablet Take 1 tablet by mouth daily     Skin Protectants, Misc. (CERAVE) OINT      triamcinolone (KENALOG) 0.1 % lotion Apply topically 3 times daily     UNABLE TO FIND IS Clinical Pro-Hil Serum daily   Epionce Face Cream daily     No current facility-administered medications for this visit.      No Known Allergies    ============================================  MEDICAL HISTORY  Past Medical History:   Diagnosis Date     Fibroadenoma of breast, left     bx  benign has clips in place      Psoriasis      Past Surgical History:   Procedure Laterality Date     DENTAL SURGERY      [place] teeth     US BREAST BIOPSY LT         Obstetric History       T0      L0     SAB0   TAB0   Ectopic0   Multiple0   Live Births0       # Outcome Date GA Lbr Mauro/2nd Weight Sex Delivery Anes PTL Lv   1 Current                     GYN History- Last pap: 3/31/2016 NIL, negative HPV     Abnormal Pap Smears: none                        Cervical procedures: none                        History of STI: none    I personally reviewed the past social/family/medical and surgical history on the date of service.   I reviewed lab work done at Intake visit with patient.    EXAM:  /71  Pulse 76  Ht 1.626 m (5'  "4\")  Wt 72.8 kg (160 lb 6.4 oz)  LMP 2018  BMI 27.53 kg/m2   EXAM:  GENERAL:  Pleasant pregnant female, alert, cooperative and well groomed.  SKIN:  Warm and dry, without lesions or rashes  HEAD: Symmetrical features.  MOUTH:  Buccal mucosa pink, moist without lesions.  Teeth in good repair.    NECK:  Thyroid without enlargement and nodules.  Lymph nodes not palpable.   LUNGS:  Clear to auscultation.  BREAST:    No dominant, fixed or suspicious masses are noted.  No skin or nipple changes or axillary nodes.   Nipples everted.      HEART:  RRR without murmur.  ABDOMEN: Soft without masses , tenderness or organomegaly.  No CVA tenderness.  Uterus palpable at size equal to dates.  No scars noted.. Fetal heart tones present.  MUSCULOSKELETAL:  Full range of motion  EXTREMITIES:  No edema. No significant varicosities.   PELVIC EXAM: deferred  WET PREP:Not done  GC/CHLAMYDIA CULTURE OBTAINED:YES    Pap NIL, negative HPV 3/31/2016       ASSESSMENT:  38 year old , 12w0d weeks of pregnancy with ADDIE of 2019 by LMP  Intrauterine pregnancy 12w0d size consistent with dates  Genetic Screening: First Trimester Screen    ICD-10-CM    1. Visit for supervision of normal first pregnancy  Z34.02 Chlamydia PCR (Clinic Collect)     Gonorrhea PCR       PLAN:  - Reviewed use of triage nurse line and contacting the on-call provider after hours for an urgent need such as fever, vagina bleeding, bladder or vaginal infection, rupture of membranes,  or term labor.    - Reviewed best evidence for: weight gain for her weight and height for pregnancy:  Based on pre-pregnancy weight and Body mass index is 27.53 kg/(m^2).  - Reviewed healthy diet and foods to avoid; exercise and activity during pregnancy; avoiding exposure to toxoplasmosis; and maintenance of a generally healthy lifestyle.   - Discussed the harms, benefits, side effects and alternative therapies for current prescribed and OTC medications.    - All pt's " questions discussed and answered.  Pt verbalized understanding of and agreement to plan of care.     - OBI labs reviewed.   - Discussed varicella nonimmune, offer vaccine postpartum.  - GC/CT collected.  - Pap with hpv cotesting up to date, due 3/2021.  - 1st trimester screening scheduled for 11/28/18.    - Continue scheduled prenatal care, RTC in 4 weeks and prn if questions or concerns    ASHLEY Pitts CNM           Again, thank you for allowing me to participate in the care of your patient.      Sincerely,    Luisa Velázquez CNM

## 2018-11-20 NOTE — LETTER
Date:November 26, 2018      Patient was self referred, no letter generated. Do not send.        [facility] Health Information

## 2018-11-21 ASSESSMENT — ANXIETY QUESTIONNAIRES: GAD7 TOTAL SCORE: 0

## 2018-11-25 PROBLEM — R87.619 PAP SMEAR ABNORMALITY OF CERVIX: Status: ACTIVE | Noted: 2018-11-25

## 2018-11-26 ENCOUNTER — PRE VISIT (OUTPATIENT)
Dept: MATERNAL FETAL MEDICINE | Facility: CLINIC | Age: 38
End: 2018-11-26

## 2018-11-28 ENCOUNTER — HOSPITAL ENCOUNTER (OUTPATIENT)
Dept: ULTRASOUND IMAGING | Facility: CLINIC | Age: 38
Discharge: HOME | End: 2018-11-28
Attending: MIDWIFE | Admitting: OBSTETRICS & GYNECOLOGY
Payer: COMMERCIAL

## 2018-11-28 ENCOUNTER — OFFICE VISIT (OUTPATIENT)
Dept: MATERNAL FETAL MEDICINE | Facility: CLINIC | Age: 38
End: 2018-11-28
Attending: MIDWIFE
Payer: COMMERCIAL

## 2018-11-28 ENCOUNTER — OFFICE VISIT (OUTPATIENT)
Dept: OBGYN | Facility: CLINIC | Age: 38
End: 2018-11-28
Attending: ADVANCED PRACTICE MIDWIFE
Payer: COMMERCIAL

## 2018-11-28 VITALS
DIASTOLIC BLOOD PRESSURE: 85 MMHG | HEART RATE: 89 BPM | BODY MASS INDEX: 27.12 KG/M2 | TEMPERATURE: 98.5 F | RESPIRATION RATE: 16 BRPM | SYSTOLIC BLOOD PRESSURE: 127 MMHG | WEIGHT: 158 LBS

## 2018-11-28 DIAGNOSIS — O26.90 PREGNANCY RELATED CONDITION, ANTEPARTUM: ICD-10-CM

## 2018-11-28 DIAGNOSIS — Z31.430 ENCOUNTER OF FEMALE FOR TESTING FOR GENETIC DISEASE CARRIER STATUS FOR PROCREATIVE MANAGEMENT: ICD-10-CM

## 2018-11-28 DIAGNOSIS — O02.1 MISSED ABORTION: Primary | ICD-10-CM

## 2018-11-28 DIAGNOSIS — O09.511 SUPERVISION OF ELDERLY PRIMIGRAVIDA IN FIRST TRIMESTER: Primary | ICD-10-CM

## 2018-11-28 DIAGNOSIS — O02.1 ABORTION, MISSED: Primary | ICD-10-CM

## 2018-11-28 DIAGNOSIS — O02.1 FETAL DEMISE BEFORE 20 WEEKS WITH RETENTION OF DEAD FETUS: Primary | ICD-10-CM

## 2018-11-28 PROBLEM — Z34.02 VISIT FOR SUPERVISION OF NORMAL FIRST PREGNANCY IN SECOND TRIMESTER: Status: RESOLVED | Noted: 2018-10-30 | Resolved: 2018-11-28

## 2018-11-28 PROCEDURE — 76813 OB US NUCHAL MEAS 1 GEST: CPT

## 2018-11-28 PROCEDURE — 96040 ZZH GENETIC COUNSELING, EACH 30 MINUTES: CPT | Mod: ZF | Performed by: GENETIC COUNSELOR, MS

## 2018-11-28 RX ORDER — DOXYCYCLINE 100 MG/10ML
100 INJECTION, POWDER, LYOPHILIZED, FOR SOLUTION INTRAVENOUS
Status: CANCELLED | OUTPATIENT
Start: 2018-11-28

## 2018-11-28 ASSESSMENT — PAIN SCALES - GENERAL: PAINLEVEL: NO PAIN (0)

## 2018-11-28 NOTE — LETTER
2018       RE: Ana Cleveland  [address]  [facility] [address]     Dear Colleague,    Thank you for referring your patient, Ana Cleveland, to the [facility] at [facility]. Please see a copy of my visit note below.    Chief Complaint:  Missed AB    SUBJECTIVE: Ana Cleveland is a 38 year old female    At 12w5d by LMP to review result of ultrasound at [facility] that identified at fetal demise.  Called over to clinic by S RN to meet with pt for fetal demise identified at [facility] for NT.     Pt had ultrasound performed at [facility] that connfirmed missed AB, no fetal heartrate.  Patient is seen here  and informed of the results. Pt expresses appropriate sadness at loss, has adequate support from her partner Sabas who joined her in [facility] clinic.  Reassured that the loss could not have been stopped by her actions or any other persons actions.  The patient has been experiencing no spotting or cramping.        Review Of Systems   ROS: 10 point ROS neg other than the symptoms noted above in the HPI.    OBJECTIVE: Blood pressure 127/85, pulse 89, temperature 98.5  F (36.9  C), temperature source Oral, resp. rate 16, weight 71.7 kg (158 lb), last menstrual period 2018, not currently breastfeeding.      Additional Physical Exam is deferred as pt completed a full physical exam at her NOB exam on 18.     A positive      ASSESSMENT:  -  Missed AB   - Rh positive    PLAN:   - Offered support, encouraged self care.  Offered coordination with mental health services prn.     - Consulted Raj BRADFORD on call - D&C as offering d/t gestational age. Raj BRADFORD will put in orders so pt can have scheduled. Epic message sent to OR  to contact pt.  Education on management options provided. Reviewed surgical management and expectations of bleeding.   Discussed possible causes of miscarriage including chromosome abnormalities.  Pt verbalized understanding that  nothing can be done to prevent a miscarriage from occuring.   Pt interested in genetics, will review more on day of D&C with provider.     -  Reviewed how/why to call or present to the emergency room if she were to develop heavy bleeding saturating a maxi pad more frequently than every hour or passing large clots.   Reviewed how/why If she is bleeding longer than one week or it is heavy, recommended she follow-up for possible emergent D&C. Pt instructed to call clinic if she develops a fever. Reviewed Ibuprofen for the cramping, up to 800mg every 8 hours.     Rhogam not indicated    All pt's and Pt's partner's questions discussed and answered. Pt  And her partner verbalized understanding of and agreement to plan of care.      100% of this 20 minute appointment was spent in face to face counseling and coordination care as above.         Anny RAMIREZ CNM

## 2018-11-28 NOTE — MR AVS SNAPSHOT
After Visit Summary   11/28/2018    Ana Cleveland    MRN: [id number]           Patient Information     Date Of Birth          1980        Visit Information        Provider Department      11/28/2018 11:30 AM Jerad Sow MD [facility] Maternal Fetal Medicine [facility]        Today's Diagnoses     Fetal demise before 20 weeks with retention of dead fetus    -  1       Follow-ups after your visit        Your next 10 appointments already scheduled     Nov 28, 2018 11:30 AM CST   Radiology MD with Jerad Sow MD   [facility] Maternal Fetal Medicine [facility] ([facility])    [address]  [facility] [address]   [phone]           Please arrive at the time given for your first appointment. This visit is used internally to schedule the physician's time during your ultrasound.            Nov 28, 2018 11:30 AM CST   RETURN OB with ASHLEY Vazquez CNM   [facility] ([facility])    [facility]  [address]  [facility] [id number] [phone]            Dec 27, 2018  9:30 AM CST   RETURN OB with ASHLEY Vazquez CNM   [facility] ([facility])    [facility]  [address]  [facility] [id number] [phone]              Future tests that were ordered for you today     Open Future Orders        Priority Expected Expires Ordered    Non Invasive Prenatal Test Cell Free DNA Routine  2/26/2019 11/28/2018    Counsyl Foresight Carrier Screen Routine  2/26/2019 11/28/2018            Who to contact     If you have questions or need follow up information about today's clinic visit or your schedule please contact [facility]ER[facility] FETAL [facility] directly at [phone].  Normal or non-critical lab and imaging results will be communicated to you by MyChart, letter or phone within 4  business days after the clinic has received the results. If you do not hear from us within 7 days, please contact the clinic through [employer] or phone. If you have a critical or abnormal lab result, we will notify you by phone as soon as possible.  Submit refill requests through [employer] or call your pharmacy and they will forward the refill request to us. Please allow 3 business days for your refill to be completed.          Additional Information About Your Visit        [employer] Information     [employer] gives you secure access to your electronic health record. If you see a primary care provider, you can also send messages to your care team and make appointments. If you have questions, please call your primary care clinic.  If you do not have a primary care provider, please call [phone] and they will assist you.        Care EveryWhere ID     This is your Care EveryWhere ID. This could be used by other organizations to access your [facility] medical records  [id number]        Your Vitals Were     Last Period                   08/31/2018 (Exact Date)            Blood Pressure from Last 3 Encounters:   11/20/18 105/71   10/30/18 100/70   03/07/18 101/70    Weight from Last 3 Encounters:   11/20/18 72.8 kg (160 lb 6.4 oz)   10/30/18 72.3 kg (159 lb 8 oz)   03/07/18 74.1 kg (163 lb 4.8 oz)              Today, you had the following     No orders found for display       Primary Care Provider    None Specified       No primary provider on file.        Equal Access to Services     [facility]: Hadii debbie killian [employer], waaxda luqadaha, qaybta kaalmada antoniada, shailesh jerez . So [facility] [phone].    ATENCIÓN: Si habla español, tiene a hoang disposición servicios gratuitos de asistencia lingüística. Llame al [phone].    We comply with applicable federal civil rights laws and [place] laws. We do not discriminate on the basis of race, color, national origin, age, disability, sex,  sexual orientation, or gender identity.            Thank you!     Thank you for choosing [facility]  for your care. Our goal is always to provide you with excellent care. Hearing back from our patients is one way we can continue to improve our services. Please take a few minutes to complete the written survey that you may receive in the mail after your visit with us. Thank you!             Your Updated Medication List - Protect others around you: Learn how to safely use, store and throw away your medicines at www.disposemymeds.org.          This list is accurate as of 11/28/18 11:19 AM.  Always use your most recent med list.                   Brand Name Dispense Instructions for use Diagnosis    CERAVE Oint           Cholecalciferol 4000 units Caps           prenatal multivitamin w/iron 27-0.8 MG tablet     100 tablet    Take 1 tablet by mouth daily    Visit for supervision of normal first pregnancy in second trimester       triamcinolone 0.1 % external lotion    KENALOG     Apply topically 3 times daily    Visit for supervision of normal first pregnancy in second trimester       UNABLE TO FIND      IS Clinical Pro-Hil Serum daily  Epionce Face Cream daily    Visit for supervision of normal first pregnancy in second trimester

## 2018-11-28 NOTE — PROGRESS NOTES
[facility] [place]  Genetic Counseling Consult    Patient: Ana Cleveland YOB: 1980   Date of Service: 18      Ana Cleveland was seen at [facility] [place] for genetic consultation to discuss the options for screening and testing for fetal chromosome abnormalities.  The indication for genetic counseling is advanced maternal age. She was unaccompanied to today's visit.       Impression/Plan:   1.  Ana had an ultrasound today for NT measurements. Unfortunately, a fetal demise was noted on today's ultrasound. Ana's OBGYN provider was informed and she proceeded to meet with her OB clinic after the diagnosis.    Per Dr. Sow's recommendation, genetic testing on products of conception should be considered.    2.  Ana was planning to pursue comprehensive carrier screening today prior to the identification of fetal demise. This option remains available to her in the future if she desires.    Pregnancy History:   /Parity:    Age at Delivery: 38 year old  ADDIE: 2019, by Last Menstrual Period  Gestational Age: 12w5d    No significant complications or exposures were reported in the current pregnancy.    Medical History:   Ana s reported medical history is not expected to impact pregnancy management or risks to fetal development.       Family History:   A three-generation pedigree was obtained, and is scanned under the  Media  tab.   The following significant findings were reported by Ana:    Ana's nephew is currently 7 years old and has a diagnosis of Neurofibromatosis type I (NF). He required surgery for several brain tumors at age 3. NF is a genetic condition that can be inherited in an autosomal dominant pattern. Ana believes that her nephew's genetic mutation is de leonard.    Ana's partner, Bertin, has a family history of early onset breast cancer. His maternal grandmother  from breast cancer in her 40s.  Reportedly, genetic testing has been completed in the family with no BRCA mutation identified.     Otherwise, the reported family history is negative for multiple miscarriages, stillbirths, birth defects, mental retardation, known genetic conditions, and consanguinity.       Carrier Screening:   The patient reports that she and the father of the pregnancy have  ancestry:      Cystic fibrosis is an autosomal recessive genetic condition that occurs with increased frequency in individuals of  ancestry and carrier screening for this condition is available.  In addition,  screening in the [facility] includes cystic fibrosis.      Expanded carrier screening for mutations in a large panel of genes associated with autosomal recessive conditions including cystic fibrosis, spinal muscular atrophy, and others, is now available.      The patient desired carrier screening today, however, did not pursue this due to fetal demise. This option could be considered in the future.       Risk Assessment for Chromosome Conditions:   We explained that the risk for fetal chromosome abnormalities increases with maternal age. We discussed specific features of common chromosome abnormalities, including Down syndrome, trisomy 13, trisomy 18, and sex chromosome trisomies.      - At age 38 at midtrimester, the risk to have a baby with Down syndrome is 1 in 216.     - At age 38 at midtrimester, the risk to have a baby with any chromosome abnormality is 1 in 105.        Testing Options:   We discussed the following options:   First trimester screening    First trimester ultrasound with nuchal translucency and nasal bone assessments, maternal plasma hCG, SANDIE-A, and AFP measurement    Screens for fetal trisomy 21, trisomy 13, and trisomy 18    Cannot screen for open neural tube defects; maternal serum AFP after 15 weeks is recommended     Non-invasive Prenatal Testing (NIPT)    Maternal plasma cell-free DNA testing;  first trimester ultrasound with nuchal translucency and nasal bone assessment is recommended, when appropriate    Screens for fetal trisomy 21, trisomy 13, trisomy 18, and sex chromosome aneuploidy    Cannot screen for open neural tube defects; maternal serum AFP after 15 weeks is recommended     Comprehensive (Level II) ultrasound: Detailed ultrasound performed between 18-22 weeks gestation to screen for major birth defects and markers for aneuploidy.      We reviewed the benefits and limitations of this testing.  Screening tests provide a risk assessment specific to the pregnancy for certain fetal chromosome abnormalities, but cannot definitively diagnose or exclude a fetal chromosome abnormality.  Follow-up genetic counseling and consideration of diagnostic testing is recommended with any abnormal screening result. Diagnostic tests carry inherent risks- including risk of miscarriage- that require careful consideration.  These tests can detect fetal chromosome abnormalities with greater than 99% certainty.  Results can be compromised by maternal cell contamination or mosaicism, and are limited by the resolution of cytogenetic G-banding technology.  There is no screening nor diagnostic test that can detect all forms of birth defects or mental disability.     It was a pleasure to be involved with Ana horta care. Face-to-face time of the meeting was 30 minutes.    Carisa Wilder MS, [facility]  Maternal Fetal Medicine  [facility]  Ph: [phone]  [email or web address]

## 2018-11-28 NOTE — MR AVS SNAPSHOT
After Visit Summary   2018    Ana Cleveland    MRN: [id number]           Patient Information     Date Of Birth          1980        Visit Information        Provider Department      2018 11:30 AM Anny Kennedy APRN CN [facility]        Today's Diagnoses     , missed    -  1       Follow-ups after your visit        Follow-up notes from your care team     Return if symptoms worsen or fail to improve, for Will be called to schedule D&C.      Your next 10 appointments already scheduled     2018   Procedure with Tyesha Anthony MD   [facility] [place], Same Day Surgery (--)    [address] [facility] [address]   [phone]            Dec 19, 2018 10:00 AM CST   Return Visit with Tyesha Anthony MD   [facility] ([facility])    [place] [facility] [address]  [address]  [address]  [facility] [address] [phone]              Who to contact     Please call your clinic at [phone] to:    Ask questions about your health    Make or cancel appointments    Discuss your medicines    Learn about your test results    Speak to your doctor            Additional Information About Your Visit        [employer] Information     [employer] gives you secure access to your electronic health record. If you see a primary care provider, you can also send messages to your care team and make appointments. If you have questions, please call your primary care clinic.  If you do not have a primary care provider, please call [phone] and they will assist you.      [employer] is an electronic gateway that provides easy, online access to your medical records. With [employer], you can request a clinic appointment, read your test results, renew a prescription or communicate with your care team.     To access your existing account, please contact your [facility] or call [phone]  for assistance.        Care EveryWhere ID     This is your Care EveryWhere ID. This could be used by other organizations to access your [place] medical records  [id number]        Your Vitals Were     Pulse Temperature Respirations Last Period BMI (Body Mass Index)       89 98.5  F (36.9  C) (Oral) 16 08/31/2018 (Exact Date) 27.12 kg/m2        Blood Pressure from Last 3 Encounters:   11/28/18 127/85   11/20/18 105/71   10/30/18 100/70    Weight from Last 3 Encounters:   11/28/18 71.7 kg (158 lb)   11/20/18 72.8 kg (160 lb 6.4 oz)   10/30/18 72.3 kg (159 lb 8 oz)              Today, you had the following     No orders found for display       Primary Care Provider    None Specified       No primary provider on file.        Equal Access to Services     [facility] : Aaron Cristobal, benedicto damian, patricia calderón, shailesh jerez . So [facility] [phone].    ATENCIÓN: Si habla español, tiene a hoang disposición servicios gratuitos de asistencia lingüística. Sabaame al [phone].    We comply with applicable federal civil rights laws and [place] laws. We do not discriminate on the basis of race, color, national origin, age, disability, sex, sexual orientation, or gender identity.            Thank you!     Thank you for choosing [facility]  for your care. Our goal is always to provide you with excellent care. Hearing back from our patients is one way we can continue to improve our services. Please take a few minutes to complete the written survey that you may receive in the mail after your visit with us. Thank you!             Your Updated Medication List - Protect others around you: Learn how to safely use, store and throw away your medicines at www.disposemymeds.org.          This list is accurate as of 11/28/18  2:32 PM.  Always use your most recent med list.                   Brand Name Dispense Instructions for use Diagnosis    CERAVE Oint            Cholecalciferol 4000 units Caps           prenatal multivitamin w/iron 27-0.8 MG tablet     100 tablet    Take 1 tablet by mouth daily    Visit for supervision of normal first pregnancy in second trimester       triamcinolone 0.1 % external lotion    KENALOG     Apply topically 3 times daily    Visit for supervision of normal first pregnancy in second trimester       UNABLE TO FIND      IS Clinical Pro-Hil Serum daily  Epionce Face Cream daily    Visit for supervision of normal first pregnancy in second trimester

## 2018-11-28 NOTE — PROGRESS NOTES
Chief Complaint:  Missed AB    SUBJECTIVE: Ana Cleveland is a 38 year old female    At 12w5d by LMP to review result of ultrasound at [facility] that identified at fetal demise.  Called over to clinic by S RN to meet with pt for fetal demise identified at [facility] for NT.     Pt had ultrasound performed at [facility] that connfirmed missed AB, no fetal heartrate.  Patient is seen here  and informed of the results. Pt expresses appropriate sadness at loss, has adequate support from her partner Sabas who joined her in [facility] clinic.  Reassured that the loss could not have been stopped by her actions or any other persons actions.  The patient has been experiencing no spotting or cramping.        Review Of Systems   ROS: 10 point ROS neg other than the symptoms noted above in the HPI.    OBJECTIVE: Blood pressure 127/85, pulse 89, temperature 98.5  F (36.9  C), temperature source Oral, resp. rate 16, weight 71.7 kg (158 lb), last menstrual period 2018, not currently breastfeeding.      Additional Physical Exam is deferred as pt completed a full physical exam at her NOB exam on 18.     A positive      ASSESSMENT:  -  Missed AB   - Rh positive    PLAN:   - Offered support, encouraged self care.  Offered coordination with mental health services prn.     - Consulted Raj BRADFORD on call - D&C as offering d/t gestational age. Raj BRADFORD will put in orders so pt can have scheduled. Epic message sent to OR  to contact pt.  Education on management options provided. Reviewed surgical management and expectations of bleeding.   Discussed possible causes of miscarriage including chromosome abnormalities.  Pt verbalized understanding that nothing can be done to prevent a miscarriage from occuring.   Pt interested in genetics, will review more on day of D&C with provider.     -  Reviewed how/why to call or present to the emergency room if she were to develop heavy bleeding saturating a maxi pad more frequently than every  hour or passing large clots.   Reviewed how/why If she is bleeding longer than one week or it is heavy, recommended she follow-up for possible emergent D&C. Pt instructed to call clinic if she develops a fever. Reviewed Ibuprofen for the cramping, up to 800mg every 8 hours.     Rhogam not indicated    All pt's and Pt's partner's questions discussed and answered. Pt  And her partner verbalized understanding of and agreement to plan of care.      100% of this 20 minute appointment was spent in face to face counseling and coordination care as above.         Anny RAMIREZ, ITALOM

## 2018-11-28 NOTE — MR AVS SNAPSHOT
After Visit Summary   11/28/2018    Ana Cleveland    MRN: [id number]           Patient Information     Date Of Birth          1980        Visit Information        Provider Department      11/28/2018 10:15 AM Mariah Wilder [facility] Maternal Fetal Medicine [facility]        Today's Diagnoses     Supervision of elderly primigravida in first trimester    -  1    Pregnancy related condition, antepartum        Encounter of female for testing for genetic disease carrier status for procreative management           Follow-ups after your visit        Your next 10 appointments already scheduled     Nov 30, 2018   Procedure with Tyesha Anthony MD   [facility], [place], Same Day Surgery (--)    [address] [facility] [address]   [phone]            Dec 19, 2018 10:00 AM CST   Return Visit with Tyesha Anthony MD   [facility] ([facility])    [facility] [address]  [address] [facility] [address] [phone]              Who to contact     If you have questions or need follow up information about today's clinic visit or your schedule please contact [facility] MATERNAL FETAL MEDICINE [facility] Day Surgery Center directly at [phone].  Normal or non-critical lab and imaging results will be communicated to you by MyChart, letter or phone within 4 business days after the clinic has received the results. If you do not hear from us within 7 days, please contact the clinic through MyChart or phone. If you have a critical or abnormal lab result, we will notify you by phone as soon as possible.  Submit refill requests through [employer] or call your pharmacy and they will forward the refill request to us. Please allow 3 business days for your refill to be completed.          Additional Information About Your Visit        [employer] Information System[employer]art Information     [employer] gives you secure access to your electronic health record. If you see a primary care  provider, you can also send messages to your care team and make appointments. If you have questions, please call your primary care clinic.  If you do not have a primary care provider, please call [phone] and they will assist you.        Care EveryWhere ID     This is your Care EveryWhere ID. This could be used by other organizations to access your [place] medical records  [id number]        Your Vitals Were     Last Period                   08/31/2018 (Exact Date)            Blood Pressure from Last 3 Encounters:   11/28/18 127/85   11/20/18 105/71   10/30/18 100/70    Weight from Last 3 Encounters:   11/28/18 71.7 kg (158 lb)   11/20/18 72.8 kg (160 lb 6.4 oz)   10/30/18 72.3 kg (159 lb 8 oz)              We Performed the Following     M Genetic Counseling        Primary Care Provider    None Specified       No primary provider on file.        Equal Access to Services     [facility]: Hadii debbie Cristobal, benedicto damian, patricia zamoranoalsia calderón, shailesh jerez . So [facility] [phone].    ATENCIÓN: Si habla español, tiene a hoang disposición servicios gratuitos de asistencia lingüística. Llame al [phone].    We comply with applicable federal civil rights laws and [place] laws. We do not discriminate on the basis of race, color, national origin, age, disability, sex, sexual orientation, or gender identity.            Thank you!     Thank you for choosing [facility]  for your care. Our goal is always to provide you with excellent care. Hearing back from our patients is one way we can continue to improve our services. Please take a few minutes to complete the written survey that you may receive in the mail after your visit with us. Thank you!             Your Updated Medication List - Protect others around you: Learn how to safely use, store and throw away your medicines at www.disposemymeds.org.          This list is accurate as of  11/28/18  3:21 PM.  Always use your most recent med list.                   Brand Name Dispense Instructions for use Diagnosis    CERAVE Oint           Cholecalciferol 4000 units Caps           prenatal multivitamin w/iron 27-0.8 MG tablet     100 tablet    Take 1 tablet by mouth daily    Visit for supervision of normal first pregnancy in second trimester       triamcinolone 0.1 % external lotion    KENALOG     Apply topically 3 times daily    Visit for supervision of normal first pregnancy in second trimester       UNABLE TO FIND      IS Clinical Pro-Hil Serum daily  Epionce Face Cream daily    Visit for supervision of normal first pregnancy in second trimester

## 2018-11-28 NOTE — PROGRESS NOTES
"Please see \"Imaging\" tab under \"Chart Review\" for details of today's US at the [facility].    Jerad Sow MD  Maternal-Fetal Medicine      "

## 2018-11-30 ENCOUNTER — HOSPITAL ENCOUNTER (OUTPATIENT)
Facility: CLINIC | Age: 38
Discharge: HOME | End: 2018-11-30
Attending: OBSTETRICS & GYNECOLOGY | Admitting: OBSTETRICS & GYNECOLOGY
Payer: COMMERCIAL

## 2018-11-30 ENCOUNTER — ANESTHESIA EVENT (OUTPATIENT)
Dept: SURGERY | Facility: CLINIC | Age: 38
End: 2018-11-30
Payer: COMMERCIAL

## 2018-11-30 ENCOUNTER — ANESTHESIA (OUTPATIENT)
Dept: SURGERY | Facility: CLINIC | Age: 38
End: 2018-11-30
Payer: COMMERCIAL

## 2018-11-30 ENCOUNTER — SURGERY (OUTPATIENT)
Age: 38
End: 2018-11-30

## 2018-11-30 VITALS
TEMPERATURE: 98.1 F | RESPIRATION RATE: 16 BRPM | SYSTOLIC BLOOD PRESSURE: 106 MMHG | DIASTOLIC BLOOD PRESSURE: 72 MMHG | HEART RATE: 72 BPM | HEIGHT: 64 IN | BODY MASS INDEX: 28.04 KG/M2 | OXYGEN SATURATION: 98 % | WEIGHT: 164.24 LBS

## 2018-11-30 DIAGNOSIS — O02.1 ABORTION, MISSED: Primary | ICD-10-CM

## 2018-11-30 LAB
ABO + RH BLD: NORMAL
ABO + RH BLD: NORMAL
BLD GP AB SCN SERPL QL: NORMAL
BLOOD BANK CMNT PATIENT-IMP: NORMAL
ERYTHROCYTE [DISTWIDTH] IN BLOOD BY AUTOMATED COUNT: 12.8 % (ref 10–15)
GLUCOSE SERPL-MCNC: 86 MG/DL (ref 70–99)
HCT VFR BLD AUTO: 37.3 % (ref 35–47)
HGB BLD-MCNC: 12.6 G/DL (ref 11.7–15.7)
MCH RBC QN AUTO: 32.6 PG (ref 26.5–33)
MCHC RBC AUTO-ENTMCNC: 33.8 G/DL (ref 31.5–36.5)
MCV RBC AUTO: 97 FL (ref 78–100)
PLATELET # BLD AUTO: 251 10E9/L (ref 150–450)
RBC # BLD AUTO: 3.86 10E12/L (ref 3.8–5.2)
SPECIMEN EXP DATE BLD: NORMAL
WBC # BLD AUTO: 7.4 10E9/L (ref 4–11)

## 2018-11-30 PROCEDURE — 25000128 H RX IP 250 OP 636: Performed by: ANESTHESIOLOGY

## 2018-11-30 PROCEDURE — 82947 ASSAY GLUCOSE BLOOD QUANT: CPT | Performed by: OBSTETRICS & GYNECOLOGY

## 2018-11-30 PROCEDURE — 36000053 ZZH SURGERY LEVEL 2 EA 15 ADDTL MIN - UMMC: Performed by: OBSTETRICS & GYNECOLOGY

## 2018-11-30 PROCEDURE — 86850 RBC ANTIBODY SCREEN: CPT | Performed by: OBSTETRICS & GYNECOLOGY

## 2018-11-30 PROCEDURE — 88305 TISSUE EXAM BY PATHOLOGIST: CPT | Performed by: OBSTETRICS & GYNECOLOGY

## 2018-11-30 PROCEDURE — 85027 COMPLETE CBC AUTOMATED: CPT | Performed by: OBSTETRICS & GYNECOLOGY

## 2018-11-30 PROCEDURE — 86901 BLOOD TYPING SEROLOGIC RH(D): CPT | Performed by: OBSTETRICS & GYNECOLOGY

## 2018-11-30 PROCEDURE — 25000128 H RX IP 250 OP 636: Performed by: NURSE ANESTHETIST, CERTIFIED REGISTERED

## 2018-11-30 PROCEDURE — 37000009 ZZH ANESTHESIA TECHNICAL FEE, EACH ADDTL 15 MIN: Performed by: OBSTETRICS & GYNECOLOGY

## 2018-11-30 PROCEDURE — 25000125 ZZHC RX 250: Performed by: OBSTETRICS & GYNECOLOGY

## 2018-11-30 PROCEDURE — 88305 TISSUE EXAM BY PATHOLOGIST: CPT | Mod: 26 | Performed by: OBSTETRICS & GYNECOLOGY

## 2018-11-30 PROCEDURE — 88262 CHROMOSOME ANALYSIS 15-20: CPT | Performed by: OBSTETRICS & GYNECOLOGY

## 2018-11-30 PROCEDURE — 71000027 ZZH RECOVERY PHASE 2 EACH 15 MINS: Performed by: OBSTETRICS & GYNECOLOGY

## 2018-11-30 PROCEDURE — 25000125 ZZHC RX 250: Performed by: NURSE ANESTHETIST, CERTIFIED REGISTERED

## 2018-11-30 PROCEDURE — 25000132 ZZH RX MED GY IP 250 OP 250 PS 637: Performed by: OBSTETRICS & GYNECOLOGY

## 2018-11-30 PROCEDURE — 88233 TISSUE CULTURE SKIN/BIOPSY: CPT | Performed by: OBSTETRICS & GYNECOLOGY

## 2018-11-30 PROCEDURE — 25000125 ZZHC RX 250: Performed by: ANESTHESIOLOGY

## 2018-11-30 PROCEDURE — 36000051 ZZH SURGERY LEVEL 2 1ST 30 MIN - UMMC: Performed by: OBSTETRICS & GYNECOLOGY

## 2018-11-30 PROCEDURE — 86900 BLOOD TYPING SEROLOGIC ABO: CPT | Performed by: OBSTETRICS & GYNECOLOGY

## 2018-11-30 PROCEDURE — 40000170 ZZH STATISTIC PRE-PROCEDURE ASSESSMENT II: Performed by: OBSTETRICS & GYNECOLOGY

## 2018-11-30 PROCEDURE — 25000128 H RX IP 250 OP 636: Performed by: OBSTETRICS & GYNECOLOGY

## 2018-11-30 PROCEDURE — 00000159 ZZHCL STATISTIC H-SEND OUTS PREP: Performed by: OBSTETRICS & GYNECOLOGY

## 2018-11-30 PROCEDURE — 27210794 ZZH OR GENERAL SUPPLY STERILE: Performed by: OBSTETRICS & GYNECOLOGY

## 2018-11-30 PROCEDURE — 37000008 ZZH ANESTHESIA TECHNICAL FEE, 1ST 30 MIN: Performed by: OBSTETRICS & GYNECOLOGY

## 2018-11-30 RX ORDER — SODIUM CHLORIDE, SODIUM LACTATE, POTASSIUM CHLORIDE, CALCIUM CHLORIDE 600; 310; 30; 20 MG/100ML; MG/100ML; MG/100ML; MG/100ML
INJECTION, SOLUTION INTRAVENOUS CONTINUOUS PRN
Status: DISCONTINUED | OUTPATIENT
Start: 2018-11-30 | End: 2018-11-30

## 2018-11-30 RX ORDER — VASOPRESSIN 20 U/ML
INJECTION PARENTERAL PRN
Status: DISCONTINUED | OUTPATIENT
Start: 2018-11-30 | End: 2018-11-30 | Stop reason: HOSPADM

## 2018-11-30 RX ORDER — NALOXONE HYDROCHLORIDE 0.4 MG/ML
.1-.4 INJECTION, SOLUTION INTRAMUSCULAR; INTRAVENOUS; SUBCUTANEOUS
Status: DISCONTINUED | OUTPATIENT
Start: 2018-11-30 | End: 2018-11-30 | Stop reason: HOSPADM

## 2018-11-30 RX ORDER — DOXYCYCLINE 100 MG/10ML
100 INJECTION, POWDER, LYOPHILIZED, FOR SOLUTION INTRAVENOUS
Status: COMPLETED | OUTPATIENT
Start: 2018-11-30 | End: 2018-11-30

## 2018-11-30 RX ORDER — IBUPROFEN 600 MG/1
600 TABLET, FILM COATED ORAL EVERY 6 HOURS PRN
Qty: 30 TABLET | Refills: 0 | COMMUNITY
Start: 2018-11-30 | End: 2018-12-19

## 2018-11-30 RX ORDER — SODIUM CHLORIDE, SODIUM LACTATE, POTASSIUM CHLORIDE, CALCIUM CHLORIDE 600; 310; 30; 20 MG/100ML; MG/100ML; MG/100ML; MG/100ML
INJECTION, SOLUTION INTRAVENOUS CONTINUOUS
Status: DISCONTINUED | OUTPATIENT
Start: 2018-11-30 | End: 2018-11-30 | Stop reason: HOSPADM

## 2018-11-30 RX ORDER — HYDROMORPHONE HYDROCHLORIDE 1 MG/ML
.3-.5 INJECTION, SOLUTION INTRAMUSCULAR; INTRAVENOUS; SUBCUTANEOUS EVERY 10 MIN PRN
Status: DISCONTINUED | OUTPATIENT
Start: 2018-11-30 | End: 2018-11-30 | Stop reason: HOSPADM

## 2018-11-30 RX ORDER — IBUPROFEN 600 MG/1
600 TABLET, FILM COATED ORAL
Status: COMPLETED | OUTPATIENT
Start: 2018-11-30 | End: 2018-11-30

## 2018-11-30 RX ORDER — METHYLERGONOVINE MALEATE 0.2 MG/1
0.2 TABLET ORAL EVERY 8 HOURS
Qty: 9 TABLET | Refills: 0 | Status: SHIPPED | OUTPATIENT
Start: 2018-11-30 | End: 2018-12-03

## 2018-11-30 RX ORDER — ONDANSETRON 2 MG/ML
4 INJECTION INTRAMUSCULAR; INTRAVENOUS EVERY 30 MIN PRN
Status: DISCONTINUED | OUTPATIENT
Start: 2018-11-30 | End: 2018-11-30 | Stop reason: HOSPADM

## 2018-11-30 RX ORDER — LIDOCAINE HYDROCHLORIDE 20 MG/ML
INJECTION, SOLUTION INFILTRATION; PERINEURAL PRN
Status: DISCONTINUED | OUTPATIENT
Start: 2018-11-30 | End: 2018-11-30

## 2018-11-30 RX ORDER — ONDANSETRON 2 MG/ML
INJECTION INTRAMUSCULAR; INTRAVENOUS PRN
Status: DISCONTINUED | OUTPATIENT
Start: 2018-11-30 | End: 2018-11-30

## 2018-11-30 RX ORDER — PROPOFOL 10 MG/ML
INJECTION, EMULSION INTRAVENOUS CONTINUOUS PRN
Status: DISCONTINUED | OUTPATIENT
Start: 2018-11-30 | End: 2018-11-30

## 2018-11-30 RX ORDER — MEPERIDINE HYDROCHLORIDE 25 MG/ML
12.5 INJECTION INTRAMUSCULAR; INTRAVENOUS; SUBCUTANEOUS
Status: DISCONTINUED | OUTPATIENT
Start: 2018-11-30 | End: 2018-11-30 | Stop reason: HOSPADM

## 2018-11-30 RX ORDER — CARBOPROST TROMETHAMINE 250 UG/ML
INJECTION, SOLUTION INTRAMUSCULAR PRN
Status: DISCONTINUED | OUTPATIENT
Start: 2018-11-30 | End: 2018-11-30

## 2018-11-30 RX ORDER — PROPOFOL 10 MG/ML
INJECTION, EMULSION INTRAVENOUS PRN
Status: DISCONTINUED | OUTPATIENT
Start: 2018-11-30 | End: 2018-11-30

## 2018-11-30 RX ORDER — LIDOCAINE 40 MG/G
CREAM TOPICAL
Status: DISCONTINUED | OUTPATIENT
Start: 2018-11-30 | End: 2018-11-30 | Stop reason: HOSPADM

## 2018-11-30 RX ORDER — ONDANSETRON 4 MG/1
4 TABLET, ORALLY DISINTEGRATING ORAL EVERY 30 MIN PRN
Status: DISCONTINUED | OUTPATIENT
Start: 2018-11-30 | End: 2018-11-30 | Stop reason: HOSPADM

## 2018-11-30 RX ORDER — METHYLERGONOVINE MALEATE 0.2 MG/ML
INJECTION INTRAVENOUS PRN
Status: DISCONTINUED | OUTPATIENT
Start: 2018-11-30 | End: 2018-11-30

## 2018-11-30 RX ORDER — FENTANYL CITRATE 50 UG/ML
25-50 INJECTION, SOLUTION INTRAMUSCULAR; INTRAVENOUS
Status: DISCONTINUED | OUTPATIENT
Start: 2018-11-30 | End: 2018-11-30 | Stop reason: HOSPADM

## 2018-11-30 RX ORDER — DIAZEPAM 10 MG/2ML
2.5 INJECTION, SOLUTION INTRAMUSCULAR; INTRAVENOUS
Status: DISCONTINUED | OUTPATIENT
Start: 2018-11-30 | End: 2018-11-30 | Stop reason: HOSPADM

## 2018-11-30 RX ADMIN — PROPOFOL 15 MG: 10 INJECTION, EMULSION INTRAVENOUS at 09:35

## 2018-11-30 RX ADMIN — SODIUM CHLORIDE, POTASSIUM CHLORIDE, SODIUM LACTATE AND CALCIUM CHLORIDE: 600; 310; 30; 20 INJECTION, SOLUTION INTRAVENOUS at 09:18

## 2018-11-30 RX ADMIN — PROPOFOL 10 MG: 10 INJECTION, EMULSION INTRAVENOUS at 10:07

## 2018-11-30 RX ADMIN — LIDOCAINE HYDROCHLORIDE 10 ML: 10 INJECTION, SOLUTION EPIDURAL; INFILTRATION; INTRACAUDAL; PERINEURAL at 09:46

## 2018-11-30 RX ADMIN — VASOPRESSIN 20 UNITS: 20 INJECTION INTRAVENOUS at 10:08

## 2018-11-30 RX ADMIN — LIDOCAINE HYDROCHLORIDE 40 MG: 20 INJECTION, SOLUTION INFILTRATION; PERINEURAL at 09:22

## 2018-11-30 RX ADMIN — ONDANSETRON 4 MG: 2 INJECTION INTRAMUSCULAR; INTRAVENOUS at 09:44

## 2018-11-30 RX ADMIN — PROPOFOL 20 MG: 10 INJECTION, EMULSION INTRAVENOUS at 09:57

## 2018-11-30 RX ADMIN — METHYLERGONOVINE MALEATE 200 MCG: 0.2 INJECTION INTRAMUSCULAR; INTRAVENOUS at 09:56

## 2018-11-30 RX ADMIN — MIDAZOLAM 2 MG: 1 INJECTION INTRAMUSCULAR; INTRAVENOUS at 09:18

## 2018-11-30 RX ADMIN — PROPOFOL 15 MG: 10 INJECTION, EMULSION INTRAVENOUS at 09:51

## 2018-11-30 RX ADMIN — PROPOFOL 40 MG: 10 INJECTION, EMULSION INTRAVENOUS at 09:22

## 2018-11-30 RX ADMIN — HYDROMORPHONE HYDROCHLORIDE 0.5 MG: 1 INJECTION, SOLUTION INTRAMUSCULAR; INTRAVENOUS; SUBCUTANEOUS at 11:19

## 2018-11-30 RX ADMIN — DOXYCYCLINE 100 MG: 100 INJECTION, POWDER, LYOPHILIZED, FOR SOLUTION INTRAVENOUS at 09:30

## 2018-11-30 RX ADMIN — HYDROMORPHONE HYDROCHLORIDE 0.5 MG: 1 INJECTION, SOLUTION INTRAMUSCULAR; INTRAVENOUS; SUBCUTANEOUS at 10:59

## 2018-11-30 RX ADMIN — SILVER NITRATE APPLICATORS 3 APPLICATOR: 25; 75 STICK TOPICAL at 11:58

## 2018-11-30 RX ADMIN — IBUPROFEN 600 MG: 600 TABLET ORAL at 11:23

## 2018-11-30 RX ADMIN — PROPOFOL 120 MCG/KG/MIN: 10 INJECTION, EMULSION INTRAVENOUS at 09:22

## 2018-11-30 RX ADMIN — CARBOPROST TROMETHAMINE 250 MCG: 250 INJECTION, SOLUTION INTRAMUSCULAR at 10:02

## 2018-11-30 NOTE — IP AVS SNAPSHOT
MRN:[id number]                      After Visit Summary   11/30/2018    Ana Cleveland    MRN: [id number]           Thank you!     Thank you for choosing [place] for your care. Our goal is always to provide you with excellent care. Hearing back from our patients is one way we can continue to improve our services. Please take a few minutes to complete the written survey that you may receive in the mail after you visit with us. Thank you!        Patient Information     Date Of Birth          1980        About your hospital stay     You were admitted on:  November 30, 2018 You last received care in the:   MAIN OR    You were discharged on:  November 30, 2018       Who to Call     For medical emergencies, please call 911.  For non-urgent questions about your medical care, please call your primary care provider or clinic, None  For questions related to your surgery, please call your surgery clinic        Attending Provider     Provider Tyesha Gonzalez MD OB/Gyn       Primary Care Provider Fax #    Physician No Ref-Primary [phone]      After Care Instructions     Discharge Instructions       Resume pre procedure diet            Discharge Instructions       Pelvic Rest. No tampons, douching or intercourse for  6  Weeks.    Recommend waiting at least 2 full normal menstrual cycles prior to attempting pregnancy again.            Shower        Shower on Post-op day  1                  Your next 10 appointments already scheduled     Dec 19, 2018 10:00 AM CST   Return Visit with Tyesha Anthony MD   [facility] ([facility])    [facility] [address]  [address]  [address]  [facility] [address]   [phone]              Further instructions from your care team       Same-Day Surgery   Adult Discharge Orders & Instructions     For 24 hours after surgery:  1. Get plenty of rest.  A responsible adult must stay with you  for at least 24 hours after you leave the hospital.   2. Pain medication can slow your reflexes. Do not drive or use heavy equipment.  If you have weakness or tingling, don't drive or use heavy equipment until this feeling goes away.  3. Mixing alcohol and pain medication can cause dizziness and slow your breathing. It can even be fatal. Do not drink alcohol while taking pain medication.  4. Avoid strenuous or risky activities.  Ask for help when climbing stairs.   5. You may feel lightheaded.  If so, sit for a few minutes before standing.  Have someone help you get up.   6. If you have nausea (feel sick to your stomach), drink only clear liquids such as apple juice, ginger ale, broth or 7-Up.  Rest may also help.  Be sure to drink enough fluids.  Move to a regular diet as you feel able. Take pain medications with a small amount of solid food, such as toast or crackers, to avoid nausea.   7. A slight fever is normal. Call the doctor if your fever is over 100 F (37.7 C) (taken under the tongue) or lasts longer than 24 hours.  8. You may have a dry mouth, muscle aches, trouble sleeping or a sore throat.  These symptoms should go away after 24 hours.  9. Do not make important or legal decisions.   Pain Management:      1. Take pain medication (if prescribed) for pain as directed by your physician.        2. WARNING: If the pain medication you have been prescribed contains Tylenol  (acetaminophen), DO NOT take additional doses of Tylenol (acetaminophen).     Call your doctor for any of the followin.  Signs of infection (fever, growing tenderness at the surgery site, severe pain, a large amount of drainage or bleeding, foul-smelling drainage, redness, swelling).    2.  It has been over 8 to 10 hours since surgery and you are still not able to urinate (pee).    3.  Headache for over 24 hours.    4.  Numbness, tingling or weakness the day after surgery (if you had spinal anesthesia).  To contact a doctor, call  _____________________________________ or:      [phone] and ask for the Resident On Call for:          __________________________________________ (answered 24 hours a day)      Emergency Department:  [place] [facility]: [phone]  [place]: [phone]               Rev. 10/2014   Discharge Instructions: Following a Dilation   and Curettage/Dilation and Evacuation    What to expect:    Expect small to moderate amount of vaginal bleeding which should taper off in 4-5 days. It should not be heavier than your regular menstrual flow.    Do not douche, and use a pad rather than tampons.     No intercourse until bleeding has ceased.    Activity:    Rest the day of surgery. You may resume normal activity the next day.    You may bathe or shower.    Avoid heavy lifting (10-15 lbs) for one week.    Comfort:    The amount of discomfort you can expect is very unpredictable. If you have pain that cannot be controlled with non-aspirin pain relievers or with the prescription you may have received, you should notify your doctor.    Abdominal cramping (like menstrual cramps) or low back ache are common and should not be a cause for concern. You will be drowsy and weak the day of surgery and possibly the following day.    Diet:    You have no restrictions on your diet. Following surgery, drink plenty of fluids and eat a light meal.    Nausea:    The anesthesia medications you received during your surgical procedure may produce some nausea.    If you feel nauseated, stay in bed, keep your head down and try drinking fluids such as Seven-Up, tea or soup.    Notify Physician at once if you experience:    A fever over 100 degrees (a low grade fever under 100 degrees is usual after surgery).    Heavy flow and/or passing large clots. Saturating more than 1 pad per hour for 2 or more hours.     Severe pain or cramps.  Rev. 5/12          Pending Results     No orders found from 11/28/2018 to  "12/1/2018.            Admission Information     Date & Time Provider Department Dept. Phone    11/30/2018 Tyesha Anthony MD [facility] MAIN [phone]      Your Vitals Were     Blood Pressure Pulse Temperature Respirations Height Weight    111/67 72 98.1  F (36.7  C) (Oral) 16 1.626 m (5' 4.02\") 74.5 kg (164 lb 3.9 oz)    Pulse Oximetry BMI (Body Mass Index)                98% 28.18 kg/m2          MyChart Information     [email or web address] gives you secure access to your electronic health record. If you see a primary care provider, you can also send messages to your care team and make appointments. If you have questions, please call your primary care clinic.  If you do not have a primary care provider, please call [phone] and they will assist you.        Care EveryWhere ID     This is your Care EveryWhere ID. This could be used by other organizations to access your [place] medical records  [id number]        Equal Access to Services     [facility]: Hadii debbie norman hadasho Soangelaali, waaxda luqadaha, qaybta kaalmada adeegyada, shailesh jerez . So [facility] [phone].    ATENCIÓN: Si habla español, tiene a hoang disposición servicios gratuitos de asistencia lingüística. Llame al [phone].    We comply with applicable federal civil rights laws and [place] laws. We do not discriminate on the basis of race, color, national origin, age, disability, sex, sexual orientation, or gender identity.               Review of your medicines      UNREVIEWED medicines. Ask your doctor about these medicines        Dose / Directions    CERAVE Oint        Refills:  0       Cholecalciferol 4000 units Caps        Refills:  0       prenatal multivitamin w/iron 27-0.8 MG tablet   Used for:  Visit for supervision of normal first pregnancy in second trimester        Dose:  1 tablet   Take 1 tablet by mouth daily   Quantity:  100 tablet   Refills:  3       triamcinolone 0.1 % external lotion   Commonly known as:  " KENALOG   Indication:  patient use four days a week   Used for:  Visit for supervision of normal first pregnancy in second trimester        Apply topically 3 times daily   Refills:  0       UNABLE TO FIND   Used for:  Visit for supervision of normal first pregnancy in second trimester        IS Clinical Pro-Hil Serum daily  Epionce Face Cream daily   Refills:  0         START taking        Dose / Directions    ibuprofen 600 MG tablet   Commonly known as:  ADVIL/MOTRIN   Used for:  , missed        Dose:  600 mg   Take 1 tablet (600 mg) by mouth every 6 hours as needed for other (mild and/or inflammatory pain)   Quantity:  30 tablet   Refills:  0       methylergonovine 0.2 MG tablet   Commonly known as:  METHERGINE   Used for:  , missed        Dose:  0.2 mg   Take 1 tablet (200 mcg) by mouth every 8 hours for 3 days   Quantity:  9 tablet   Refills:  0            Where to get your medicines      These medications were sent to [facility] [address]  [address] [place] [address], [facility] [address]     Phone:  [phone]     methylergonovine 0.2 MG tablet         Some of these will need a paper prescription and others can be bought over the counter. Ask your nurse if you have questions.     You don't need a prescription for these medications     ibuprofen 600 MG tablet                Protect others around you: Learn how to safely use, store and throw away your medicines at www.disposemymeds.org.             Medication List: This is a list of all your medications and when to take them. Check marks below indicate your daily home schedule. Keep this list as a reference.      Medications           Morning Afternoon Evening Bedtime As Needed    CERAVE Oint                                Cholecalciferol 4000 units Caps                                ibuprofen 600 MG tablet   Commonly known as:  ADVIL/MOTRIN   Take 1 tablet (600 mg) by mouth every 6 hours as needed for  other (mild and/or inflammatory pain)                                methylergonovine 0.2 MG tablet   Commonly known as:  METHERGINE   Take 1 tablet (200 mcg) by mouth every 8 hours for 3 days                                prenatal multivitamin w/iron 27-0.8 MG tablet   Take 1 tablet by mouth daily                                triamcinolone 0.1 % external lotion   Commonly known as:  KENALOG   Apply topically 3 times daily                                UNABLE TO FIND   IS Clinical Pro-Hil Serum daily  Epionce Face Cream daily

## 2018-11-30 NOTE — IP AVS SNAPSHOT
MAIN OR    [address]    [place] [address]    Phone:  [phone]                                       After Visit Summary   11/30/2018    Ana Cleveland    MRN: [id number]           After Visit Summary Signature Page     I have received my discharge instructions, and my questions have been answered. I have discussed any challenges I see with this plan with the nurse or doctor.    ..........................................................................................................................................  Patient/Patient Representative Signature      ..........................................................................................................................................  Patient Representative Print Name and Relationship to Patient    ..................................................               ................................................  Date                                   Time    ..........................................................................................................................................  Reviewed by Signature/Title    ...................................................              ..............................................  Date                                               Time          22EPIC Rev 08/18

## 2018-11-30 NOTE — DISCHARGE INSTRUCTIONS
Same-Day Surgery   Adult Discharge Orders & Instructions     For 24 hours after surgery:  1. Get plenty of rest.  A responsible adult must stay with you for at least 24 hours after you leave the hospital.   2. Pain medication can slow your reflexes. Do not drive or use heavy equipment.  If you have weakness or tingling, don't drive or use heavy equipment until this feeling goes away.  3. Mixing alcohol and pain medication can cause dizziness and slow your breathing. It can even be fatal. Do not drink alcohol while taking pain medication.  4. Avoid strenuous or risky activities.  Ask for help when climbing stairs.   5. You may feel lightheaded.  If so, sit for a few minutes before standing.  Have someone help you get up.   6. If you have nausea (feel sick to your stomach), drink only clear liquids such as apple juice, ginger ale, broth or 7-Up.  Rest may also help.  Be sure to drink enough fluids.  Move to a regular diet as you feel able. Take pain medications with a small amount of solid food, such as toast or crackers, to avoid nausea.   7. A slight fever is normal. Call the doctor if your fever is over 100 F (37.7 C) (taken under the tongue) or lasts longer than 24 hours.  8. You may have a dry mouth, muscle aches, trouble sleeping or a sore throat.  These symptoms should go away after 24 hours.  9. Do not make important or legal decisions.   Pain Management:      1. Take pain medication (if prescribed) for pain as directed by your physician.        2. WARNING: If the pain medication you have been prescribed contains Tylenol  (acetaminophen), DO NOT take additional doses of Tylenol (acetaminophen).     Call your doctor for any of the followin.  Signs of infection (fever, growing tenderness at the surgery site, severe pain, a large amount of drainage or bleeding, foul-smelling drainage, redness, swelling).    2.  It has been over 8 to 10 hours since surgery and you are still not able to urinate (pee).    3.   Headache for over 24 hours.    4.  Numbness, tingling or weakness the day after surgery (if you had spinal anesthesia).  To contact a doctor, call _____________________________________ or:      [phone] and ask for the Resident On Call for:          __________________________________________ (answered 24 hours a day)      Emergency Department:  [place] [facility]: [phone]  [place] [facility]: [phone]               Rev. 10/2014   Discharge Instructions: Following a Dilation   and Curettage/Dilation and Evacuation    What to expect:    Expect small to moderate amount of vaginal bleeding which should taper off in 4-5 days. It should not be heavier than your regular menstrual flow.    Do not douche, and use a pad rather than tampons.     No intercourse until bleeding has ceased.    Activity:    Rest the day of surgery. You may resume normal activity the next day.    You may bathe or shower.    Avoid heavy lifting (10-15 lbs) for one week.    Comfort:    The amount of discomfort you can expect is very unpredictable. If you have pain that cannot be controlled with non-aspirin pain relievers or with the prescription you may have received, you should notify your doctor.    Abdominal cramping (like menstrual cramps) or low back ache are common and should not be a cause for concern. You will be drowsy and weak the day of surgery and possibly the following day.    Diet:    You have no restrictions on your diet. Following surgery, drink plenty of fluids and eat a light meal.    Nausea:    The anesthesia medications you received during your surgical procedure may produce some nausea.    If you feel nauseated, stay in bed, keep your head down and try drinking fluids such as Seven-Up, tea or soup.    Notify Physician at once if you experience:    A fever over 100 degrees (a low grade fever under 100 degrees is usual after surgery).    Heavy flow and/or passing large clots. Saturating more  than 1 pad per hour for 2 or more hours.     Severe pain or cramps.  Rev. 5/12

## 2018-11-30 NOTE — ANESTHESIA PREPROCEDURE EVALUATION
Anesthesia Pre-Procedure Evaluation    Patient: Ana Cleveland   MRN:     [id number] Gender:   female   Age:    38 year old :      1980        Preoperative Diagnosis: Missed    Procedure(s):  DILATION AND CURETTAGE SUCTION   (12 WEEKS)     Past Medical History:   Diagnosis Date     Fibroadenoma of breast, left 2016    bx  benign has clips in place      Psoriasis       Past Surgical History:   Procedure Laterality Date     DENTAL SURGERY      [place]     US BREAST BIOPSY LT  2016          Anesthesia Evaluation     .             ROS/MED HX    ENT/Pulmonary:  - neg pulmonary ROS     Neurologic:  - neg neurologic ROS     Cardiovascular:  - neg cardiovascular ROS       METS/Exercise Tolerance:     Hematologic:  - neg hematologic  ROS       Musculoskeletal:  - neg musculoskeletal ROS       GI/Hepatic:  - neg GI/hepatic ROS       Renal/Genitourinary:  - ROS Renal section negative       Endo:  - neg endo ROS       Psychiatric:  - neg psychiatric ROS       Infectious Disease:  - neg infectious disease ROS       Malignancy:         Other:                         PHYSICAL EXAM:   Mental Status/Neuro: A/A/O   Airway: Facies: Feasible  Mallampati: II  Mouth/Opening: Full  TM distance: > 6 cm  Neck ROM: Full   Respiratory: Auscultation: CTAB     Resp. Rate: Normal     Resp. Effort: Normal      CV: Rhythm: Regular  Rate: Age appropriate  Heart: Normal Sounds   Comments:      Dental: Normal                  Lab Results   Component Value Date    WBC 9.4 10/30/2018    HGB 13.5 10/30/2018    HCT 39.7 10/30/2018     10/30/2018       Preop Vitals  BP Readings from Last 3 Encounters:   18 93/67   18 127/85   18 105/71    Pulse Readings from Last 3 Encounters:   18 72   18 89   18 76      Resp Readings from Last 3 Encounters:   18 16   18 16    SpO2 Readings from Last 3 Encounters:   18 99%      Temp Readings from Last 1 Encounters:   18 36.5  C  "(97.7  F) (Oral)    Ht Readings from Last 1 Encounters:   11/30/18 1.626 m (5' 4.02\")      Wt Readings from Last 1 Encounters:   11/30/18 74.5 kg (164 lb 3.9 oz)    Estimated body mass index is 28.18 kg/(m^2) as calculated from the following:    Height as of this encounter: 1.626 m (5' 4.02\").    Weight as of this encounter: 74.5 kg (164 lb 3.9 oz).     LDA:  Peripheral IV 11/30/18 Left Hand (Active)   Site Assessment WDL 11/30/2018  8:33 AM   Line Status Saline locked 11/30/2018  8:33 AM   Phlebitis Scale 0-->no symptoms 11/30/2018  8:33 AM   Infiltration Scale 0 11/30/2018  8:33 AM   Infiltration Site Treatment Method  None 11/30/2018  8:33 AM   Extravasation? No 11/30/2018  8:33 AM   Dressing Intervention New dressing  11/30/2018  8:33 AM   Number of days:0            Assessment:   ASA SCORE: 2    NPO Status: > 6 hours since completed [employer]   Documentation: H&P complete; Preop Testing complete; Consents complete   Proceeding: Proceed without further delay  Tobacco Use:  NO Active use of Tobacco/UNKNOWN Tobacco use status     Plan:   Anes. Type:  MAC   Pre-Induction: Midazolam IV   Induction:  IV (Standard)   Airway: Native Airway   Access/Monitoring: PIV   Maintenance: Propofol; IV   Emergence: Procedure Site   Logistics: Same Day Surgery     Postop Pain/Sedation Strategy:  Standard-Options: Opioids PRN     PONV Management:  Adult Risk Factors: Female, Non-Smoker, Postop Opioids  Prevention: Propofol Infusion; Ondansetron     CONSENT: Direct conversation   Plan and risks discussed with: Patient; Spouse        Comments for Plan/Consent:        Healthy, 37 yo with hx of missed AB at 12 weeks gestation.        Labs                 H/H            12.6/37.3               plts             281                   Type and Screen - pending      Plan  -  MAC      The risks, benefits and possible complications of MAC anesthesia, including conversion to GETA discussed in full with the patient and her .  She " voices understanding and wishes to proceed.      Dr. Queenie Valencia MD  Anesthesia  11/30/2018 @ 0841 am.                         Queenie Valencia MD

## 2018-11-30 NOTE — ANESTHESIA CARE TRANSFER NOTE
Patient: Ana Cleveland    Procedure(s):  DILATION AND CURETTAGE SUCTION   (12 WEEKS)    Diagnosis: Missed   Diagnosis Additional Information: No value filed.    Anesthesia Type:   No value filed.     Note:  Airway :Room Air  Patient transferred to:Phase II  Comments: Arrived in phase II, report to RN, vitals stable, patient comfortable.  Handoff Report: Identifed the Patient, Identified the Reponsible Provider, Reviewed the pertinent medical history, Discussed the surgical course, Reviewed Intra-OP anesthesia mangement and issues during anesthesia, Set expectations for post-procedure period and Allowed opportunity for questions and acknowledgement of understanding      Vitals: (Last set prior to Anesthesia Care Transfer)    CRNA VITALS  2018 0949 - 2018 1034      2018             Pulse: 72    SpO2: 100 %                Electronically Signed By: ASHLEY Carr CRNA  2018  10:34 AM

## 2018-11-30 NOTE — BRIEF OP NOTE
[facility] Gyn Surgery Operative Note    Pre-operative diagnosis: Missed  at 12+2 wks   Post-operative diagnosis: Same   Procedure: D and C suction   Surgeon: Tyesha Anthony MD   Assistant(s): Jacqueline German MD   Anesthesia: Local anesthesia and MAC   Estimated blood loss: Less than 100 ml   Total IV fluids: (See anesthesia record)   Blood transfusion: No transfusion was given during surgery   Total urine output: (See anesthesia record)   Drains: None   Specimens: Products of conception. Fetal parts w/ 4 extremities, thorax and calvarium present for chromosome analysis     Implants: None   Findings:  12- 13 wk size uterus, mid to anterior position, empty per ultrasound eval post procedure.    Complications: None   Condition: Stable   Description of Operation:   After surgical consent was reviewed the patient was brought to the operating room where she was placed in dorsal lithotomy position.  She was given a MAC anesthetic and sterilely prepped and draped in the usual fashion.  Speculum was inserted in the vagina and the cervix is easily visualized.  It was serially and easily dilated to 12 mm.  A 12 mm suction curette was placed in the cavity and 2 passes were taken.  A gritty sensation was noted.  Sharp curettage was then completed for sensation noted throughout.  Greater than usual bleeding was noted from the cervical os.  Methergine and Hemabate were given intramuscularly.  At this point ultrasound was completed by Dr. German to assess the cavity during another passe with the suction curettage.  A clear endometrial  stripe swas noted.  There is some structural either fibroid or adenomyosis posteriorly. There was no futher intracavitary debris.      At this point dilute vasopressin solution (20 units in the 100 ml injectable saline) were used to inject the cervix.  A total of 10 cc were used. All instruments were removed from the cervix.  The tenaculum site was made hemostatic using silver  nitrate.  Patient was brought to the PACU in stable condition.  Sponge needle lap counts are correct x2.      Tyesha Anthony MD, FACOG  [facility]  OB/GYN    11/30/2018  10:47 AM

## 2018-11-30 NOTE — ANESTHESIA POSTPROCEDURE EVALUATION
Anesthesia POST Procedure Evaluation    Patient: Ana Cleveland   MRN:     [id number] Gender:   female   Age:    38 year old :      1980        Preoperative Diagnosis: Missed    Procedure(s):  DILATION AND CURETTAGE SUCTION   (12 WEEKS)   Postop Comments: No value filed.       Anesthesia Type:  MAC    Reportable Event: NO     PAIN: Uncomplicated   Sign Out status: Comfortable, Well controlled pain     PONV: No PONV   Sign Out status:  No Nausea or Vomiting     Neuro/Psych: Uneventful perioperative course   Sign Out Status: Preoperative baseline; Age appropriate mentation     Airway/Resp.: Uneventful perioperative course   Sign Out Status: Non labored breathing, age appropriate RR; Resp. Status within EXPECTED Parameters     CV: Uneventful perioperative course   Sign Out status: Appropriate BP and perfusion indices; Appropriate HR/Rhythm     Disposition:   Sign Out in:  PACU  Disposition:  Phase II; Home  Recovery Course: Uneventful  Follow-Up: Not required     Comments/Narrative:      Seen by me in Phase 2.  Doing well.   at the bedside.  VSS.   Voicing no complaints.  No apparent anesthesia complications. Will discharge to home soon.      Dr. Queenie Valencia MD  Anesthesia  2018 @ 1253 pm.           Last Anesthesia Record Vitals:  CRNA VITALS  2018 0949 - 2018 1049      2018             Pulse: 72    SpO2: 100 %          Last PACU/Preop Vitals:  Vitals:    18 1139 18 1145 18 1200   BP: 110/70 105/69 106/72   Pulse:      Resp: 16 14 16   Temp:      SpO2: 97% 97% 98%         Electronically Signed By: Queenie Valencia MD, 2018, 12:52 PM

## 2018-12-07 LAB — COPATH REPORT: NORMAL

## 2018-12-10 ENCOUNTER — TELEPHONE (OUTPATIENT)
Dept: OBGYN | Facility: CLINIC | Age: 38
End: 2018-12-10

## 2018-12-10 DIAGNOSIS — N93.9 VAGINAL BLEEDING: Primary | ICD-10-CM

## 2018-12-10 NOTE — TELEPHONE ENCOUNTER
Patient called to discuss new onset bleeding post D&C for incomplete miscarriage on 11/30. Pt states bleeding subsided to light spotting and now has increased to period-like flow. Pt also endorses numbness, and bruise/achy feeling of left hip. Denies any trauma or activity induced inury which may have caused this.    Pt denies pelvic pain/discomfort. Denies fever, denies four odor. Denies increase in general activity precipitating the increase in bleeding.    Spoke with Dr. Gustafson who advised pt can have US and f/u in clinic if desired or can await US results prior to f/u.    Spoke with patient who prefers US with f/u in clinic.    Scheduled on Wednesday 12/12    Gave bleeding and infection precautions -- when to call versus when to present to ED. Patient expressed understanding and has no further questions at this time.

## 2018-12-12 ENCOUNTER — OFFICE VISIT (OUTPATIENT)
Dept: OBGYN | Facility: CLINIC | Age: 38
End: 2018-12-12
Attending: OBSTETRICS & GYNECOLOGY
Payer: COMMERCIAL

## 2018-12-12 ENCOUNTER — ANCILLARY PROCEDURE (OUTPATIENT)
Dept: ULTRASOUND IMAGING | Facility: CLINIC | Age: 38
End: 2018-12-12
Attending: OBSTETRICS & GYNECOLOGY
Payer: COMMERCIAL

## 2018-12-12 VITALS
HEIGHT: 64 IN | WEIGHT: 165.9 LBS | DIASTOLIC BLOOD PRESSURE: 68 MMHG | HEART RATE: 84 BPM | SYSTOLIC BLOOD PRESSURE: 107 MMHG | BODY MASS INDEX: 28.32 KG/M2

## 2018-12-12 DIAGNOSIS — O03.9 MISCARRIAGE: Primary | ICD-10-CM

## 2018-12-12 DIAGNOSIS — N93.9 VAGINAL BLEEDING: ICD-10-CM

## 2018-12-12 PROBLEM — O02.1 ABORTION, MISSED: Status: RESOLVED | Noted: 2018-11-28 | Resolved: 2018-12-12

## 2018-12-12 LAB
HCG UR QL: POSITIVE
INTERNAL QC OK POCT: YES

## 2018-12-12 PROCEDURE — G0463 HOSPITAL OUTPT CLINIC VISIT: HCPCS | Mod: ZF

## 2018-12-12 PROCEDURE — 76830 TRANSVAGINAL US NON-OB: CPT

## 2018-12-12 PROCEDURE — 81025 URINE PREGNANCY TEST: CPT | Mod: ZF | Performed by: OBSTETRICS & GYNECOLOGY

## 2018-12-12 ASSESSMENT — MIFFLIN-ST. JEOR: SCORE: 1427.52

## 2018-12-12 NOTE — NURSING NOTE
Chief Complaint   Patient presents with     Follow Up     Follow up uls for bleeding post D&C on 11/30       See BUCK Ortiz 12/12/2018

## 2018-12-12 NOTE — LETTER
Date:December 14, 2018      Patient was self referred, no letter generated. Do not send.        [facility] Health Information

## 2018-12-12 NOTE — PROGRESS NOTES
"S: 39 yo  s/p suction curettage on 18 for missed ab at 12 weeks presents for one episode of heavy bleeding on 12/10. States she woke up that morning with hip pain and numbness (since resolved) had an hour or two of heavy bleeding which has stopped since then. No nausea, emesis, pain, fevers, chills.   Today UPT negative and ultrasound shows mildly thickened and irregular endometrial stripe.     Obstetric History       T0      L0     SAB1   TAB0   Ectopic0   Multiple0   Live Births0       # Outcome Date GA Lbr Mauro/2nd Weight Sex Delivery Anes PTL Lv   1 SAB 18 12w5d    AB, MISSED           Past Medical History:   Diagnosis Date     Fibroadenoma of breast, left     bx  benign has clips in place      Psoriasis      Patient Active Problem List   Diagnosis     Pap smear abnormality of cervix     , missed - 12w5d noted  18.  D&C to be scheduled. Desires genetics     /68 (BP Location: Right arm, Patient Position: Chair)   Pulse 84   Ht 1.626 m (5' 4\")   Wt 75.3 kg (165 lb 14.4 oz)   Breastfeeding? No   BMI 28.48 kg/m    Appears well. Ultrasound results reviewed.     A: normal recovery following suction curettage  P: if UPT still pos next week be seen  If bleeding returns be seen  Expect ovulation and menses in 6-8 weeks.   Albina Pruitt    "

## 2018-12-12 NOTE — LETTER
"2018       RE: Ana Cleveland  [address]  [place] [address]     Dear Colleague,    Thank you for referring your patient, Ana Cleveland, to the [facility] at [facility]. Please see a copy of my visit note below.    S: 37 yo  s/p suction curettage on 18 for missed ab at 12 weeks presents for one episode of heavy bleeding on 12/10. States she woke up that morning with hip pain and numbness (since resolved) had an hour or two of heavy bleeding which has stopped since then. No nausea, emesis, pain, fevers, chills.   Today UPT negative and ultrasound shows mildly thickened and irregular endometrial stripe.     Obstetric History       T0      L0     SAB1   TAB0   Ectopic0   Multiple0   Live Births0       # Outcome Date GA Lbr Mauro/2nd Weight Sex Delivery Anes PTL Lv   1 SAB 18 12w5d    AB, MISSED           Past Medical History:   Diagnosis Date     Fibroadenoma of breast, left     bx  benign has clips in place      Psoriasis      Patient Active Problem List   Diagnosis     Pap smear abnormality of cervix     , missed - 12w5d noted  18.  D&C to be scheduled. Desires genetics     /68 (BP Location: Right arm, Patient Position: Chair)   Pulse 84   Ht 1.626 m (5' 4\")   Wt 75.3 kg (165 lb 14.4 oz)   Breastfeeding? No   BMI 28.48 kg/m     Appears well. Ultrasound results reviewed.     A: normal recovery following suction curettage  P: if UPT still pos next week be seen  If bleeding returns be seen  Expect ovulation and menses in 6-8 weeks.   Albina Pruitt      Again, thank you for allowing me to participate in the care of your patient.      Sincerely,    Albina Pruitt MD      "

## 2018-12-19 ENCOUNTER — OFFICE VISIT (OUTPATIENT)
Dept: OBGYN | Facility: CLINIC | Age: 38
End: 2018-12-19
Attending: OBSTETRICS & GYNECOLOGY
Payer: COMMERCIAL

## 2018-12-19 VITALS
WEIGHT: 160 LBS | SYSTOLIC BLOOD PRESSURE: 116 MMHG | BODY MASS INDEX: 27.46 KG/M2 | HEART RATE: 75 BPM | DIASTOLIC BLOOD PRESSURE: 74 MMHG

## 2018-12-19 DIAGNOSIS — O02.1 MISSED AB: Primary | ICD-10-CM

## 2018-12-19 LAB
HCG UR QL: NEGATIVE
INTERNAL QC OK POCT: YES

## 2018-12-19 PROCEDURE — 81025 URINE PREGNANCY TEST: CPT | Mod: ZF | Performed by: OBSTETRICS & GYNECOLOGY

## 2018-12-19 ASSESSMENT — PAIN SCALES - GENERAL: PAINLEVEL: NO PAIN (0)

## 2018-12-19 NOTE — LETTER
Date:December 20, 2018      Patient was self referred, no letter generated. Do not send.        [facility] Health Information

## 2018-12-19 NOTE — LETTER
"2018       RE: Ana Cleveland  [address]  [place] [address]     Dear Colleague,    Thank you for referring your patient, Ana Cleveland, to the [facility] at [facility]. Please see a copy of my visit note below.    [facility]  Return OB Visit    S: Doing physically well since her D&C on  and has not had any further vaginal bleeding since her episode of heavy bleeding on 12/10 which resolved on its own after a few hours. She has not returned to her menses yet, and her daily pregnancy test at home for the past week has always been positive. She is not currently sexually active. She denies any fevers, N/V, vaginal discharge, abdominal pain, cramping, SOB, or headaches.     She is anxious for her menstrual periods to return so she and her  can return to attempting conception. Since the D&C she continues to have emotional distress though feels that she has a good support system at home and declines any desire to meet with any mental health providers.     O: /74   Pulse 75   Wt 72.6 kg (160 lb)   Breastfeeding? No   BMI 27.46 kg/m       Gen: Well-appearing, though tearful when discussing the D&C, describes mood as \"okay\".    A/P:  Ana Cleveland is a 38 year old  here for return OB visit s/p D&C on . No s/s of complications s/p her D&C, and UPT in clinic today is negative. Patient anxious for return of her period so she can begin attempting conception again, though informed patient it may take up to 6-8 weeks after D&C for menses to return and that she should wait 1-2 cycles until attempting conception again. She can continue to take her pre-surjit vitamins currently.    Chromosome studies on fetus pending.     Return to clinic if does not have menstrual period return by end .    Johnie Davison, MS III      I was present with the medical student who participated in the service and in the documentation of " the note. I have verified the history and personally performed the physical exam and medical decision making. I agree with the assessment and plan of care as documented in the note.    Tyesha Anthony MD              Again, thank you for allowing me to participate in the care of your patient.      Sincerely,    Tyesha Anthony MD

## 2018-12-19 NOTE — NURSING NOTE
Chief Complaint   Patient presents with     Surgical Followup     D&C 11/30/2018   Blaire Sylvester LPN

## 2018-12-19 NOTE — PROGRESS NOTES
"[facility]  Return OB Visit    S: Doing physically well since her D&C on  and has not had any further vaginal bleeding since her episode of heavy bleeding on 12/10 which resolved on its own after a few hours. She has not returned to her menses yet, and her daily pregnancy test at home for the past week has always been positive. She is not currently sexually active. She denies any fevers, N/V, vaginal discharge, abdominal pain, cramping, SOB, or headaches.     She is anxious for her menstrual periods to return so she and her  can return to attempting conception. Since the D&C she continues to have emotional distress though feels that she has a good support system at home and declines any desire to meet with any mental health providers.     O: /74   Pulse 75   Wt 72.6 kg (160 lb)   Breastfeeding? No   BMI 27.46 kg/m      Gen: Well-appearing, though tearful when discussing the D&C, describes mood as \"okay\".    A/P:  Ana Cleveland is a 38 year old  here for return OB visit s/p D&C on . No s/s of complications s/p her D&C, and UPT in clinic today is negative. Patient anxious for return of her period so she can begin attempting conception again, though informed patient it may take up to 6-8 weeks after D&C for menses to return and that she should wait 1-2 cycles until attempting conception again. She can continue to take her pre-surjit vitamins currently.    Chromosome studies on fetus pending.     Return to clinic if does not have menstrual period return by end .    Johnie Davison, MS III      I was present with the medical student who participated in the service and in the documentation of the note. I have verified the history and personally performed the physical exam and medical decision making. I agree with the assessment and plan of care as documented in the note.    Tyesha Anthony MD            "

## 2018-12-20 LAB — COPATH REPORT: NORMAL

## 2019-01-22 ENCOUNTER — TELEPHONE (OUTPATIENT)
Dept: OBGYN | Facility: CLINIC | Age: 39
End: 2019-01-22

## 2019-01-22 NOTE — TELEPHONE ENCOUNTER
Received message from nurses in my inbox regarding patient call requesting results of ?biopsy (I believe chromosome analysis). Permission per patient message to leave detailed message on her voicemail if she did not answer.    Called patient but no answer on mobile number and directed to voicemail. I left a detailed explanation that the lab documented that they were unable to grow the tissue in culture to be able to analyze any genetic material. Explained that sometimes if miscarriage happens several days to weeks prior to detecting loss of baby, that the tissue begins to degenerate and cannot obtain a good enough specimen for the tissue to be able to grow and give genetic information.     Left clinic number in case she desires to call with any further questions.     Jacqueline German MD  1/22/2019  3:17 PM

## 2019-04-26 ENCOUNTER — HOSPITAL ENCOUNTER (OUTPATIENT)
Dept: ULTRASOUND IMAGING | Facility: CLINIC | Age: 39
Discharge: HOME | End: 2019-04-26
Attending: MIDWIFE | Admitting: MIDWIFE
Payer: COMMERCIAL

## 2019-04-26 ENCOUNTER — OFFICE VISIT (OUTPATIENT)
Dept: OBGYN | Facility: CLINIC | Age: 39
End: 2019-04-26
Attending: MIDWIFE
Payer: COMMERCIAL

## 2019-04-26 VITALS
HEART RATE: 87 BPM | WEIGHT: 165.2 LBS | HEIGHT: 64 IN | BODY MASS INDEX: 28.2 KG/M2 | DIASTOLIC BLOOD PRESSURE: 72 MMHG | SYSTOLIC BLOOD PRESSURE: 104 MMHG

## 2019-04-26 DIAGNOSIS — Z34.91 ENCOUNTER FOR SUPERVISION OF NORMAL PREGNANCY IN FIRST TRIMESTER, UNSPECIFIED GRAVIDITY: ICD-10-CM

## 2019-04-26 DIAGNOSIS — O09.529 SUPERVISION OF HIGH-RISK PREGNANCY OF ELDERLY MULTIGRAVIDA: Primary | ICD-10-CM

## 2019-04-26 LAB
ABO + RH BLD: NORMAL
ABO + RH BLD: NORMAL
BASOPHILS # BLD AUTO: 0 10E9/L (ref 0–0.2)
BASOPHILS NFR BLD AUTO: 0.1 %
BLD GP AB SCN SERPL QL: NORMAL
BLOOD BANK CMNT PATIENT-IMP: NORMAL
DIFFERENTIAL METHOD BLD: NORMAL
EOSINOPHIL # BLD AUTO: 0.1 10E9/L (ref 0–0.7)
EOSINOPHIL NFR BLD AUTO: 1.1 %
ERYTHROCYTE [DISTWIDTH] IN BLOOD BY AUTOMATED COUNT: 13.1 % (ref 10–15)
HCT VFR BLD AUTO: 38.7 % (ref 35–47)
HGB BLD-MCNC: 13.1 G/DL (ref 11.7–15.7)
IMM GRANULOCYTES # BLD: 0 10E9/L (ref 0–0.4)
IMM GRANULOCYTES NFR BLD: 0.2 %
LYMPHOCYTES # BLD AUTO: 2.2 10E9/L (ref 0.8–5.3)
LYMPHOCYTES NFR BLD AUTO: 23.7 %
MCH RBC QN AUTO: 32.8 PG (ref 26.5–33)
MCHC RBC AUTO-ENTMCNC: 33.9 G/DL (ref 31.5–36.5)
MCV RBC AUTO: 97 FL (ref 78–100)
MONOCYTES # BLD AUTO: 0.7 10E9/L (ref 0–1.3)
MONOCYTES NFR BLD AUTO: 7.2 %
NEUTROPHILS # BLD AUTO: 6.4 10E9/L (ref 1.6–8.3)
NEUTROPHILS NFR BLD AUTO: 67.7 %
NRBC # BLD AUTO: 0 10*3/UL
NRBC BLD AUTO-RTO: 0 /100
PLATELET # BLD AUTO: 328 10E9/L (ref 150–450)
RBC # BLD AUTO: 4 10E12/L (ref 3.8–5.2)
SPECIMEN EXP DATE BLD: NORMAL
WBC # BLD AUTO: 9.5 10E9/L (ref 4–11)

## 2019-04-26 PROCEDURE — 76801 OB US < 14 WKS SINGLE FETUS: CPT

## 2019-04-26 PROCEDURE — 0064U ANTB TP TOTAL&RPR IA QUAL: CPT | Performed by: ADVANCED PRACTICE MIDWIFE

## 2019-04-26 PROCEDURE — 36415 COLL VENOUS BLD VENIPUNCTURE: CPT | Performed by: ADVANCED PRACTICE MIDWIFE

## 2019-04-26 PROCEDURE — 86706 HEP B SURFACE ANTIBODY: CPT | Performed by: ADVANCED PRACTICE MIDWIFE

## 2019-04-26 PROCEDURE — 86850 RBC ANTIBODY SCREEN: CPT | Performed by: ADVANCED PRACTICE MIDWIFE

## 2019-04-26 PROCEDURE — 85025 COMPLETE CBC W/AUTO DIFF WBC: CPT | Performed by: ADVANCED PRACTICE MIDWIFE

## 2019-04-26 PROCEDURE — 86803 HEPATITIS C AB TEST: CPT | Performed by: ADVANCED PRACTICE MIDWIFE

## 2019-04-26 PROCEDURE — 86762 RUBELLA ANTIBODY: CPT | Performed by: ADVANCED PRACTICE MIDWIFE

## 2019-04-26 PROCEDURE — G0463 HOSPITAL OUTPT CLINIC VISIT: HCPCS | Mod: ZF,25

## 2019-04-26 PROCEDURE — 86900 BLOOD TYPING SEROLOGIC ABO: CPT | Performed by: ADVANCED PRACTICE MIDWIFE

## 2019-04-26 PROCEDURE — 82306 VITAMIN D 25 HYDROXY: CPT | Performed by: ADVANCED PRACTICE MIDWIFE

## 2019-04-26 PROCEDURE — 87340 HEPATITIS B SURFACE AG IA: CPT | Performed by: ADVANCED PRACTICE MIDWIFE

## 2019-04-26 PROCEDURE — 87389 HIV-1 AG W/HIV-1&-2 AB AG IA: CPT | Performed by: ADVANCED PRACTICE MIDWIFE

## 2019-04-26 PROCEDURE — 86901 BLOOD TYPING SEROLOGIC RH(D): CPT | Performed by: ADVANCED PRACTICE MIDWIFE

## 2019-04-26 PROCEDURE — 87086 URINE CULTURE/COLONY COUNT: CPT | Performed by: ADVANCED PRACTICE MIDWIFE

## 2019-04-26 RX ORDER — ONDANSETRON 4 MG/1
4 TABLET, ORALLY DISINTEGRATING ORAL ONCE
Status: CANCELLED | OUTPATIENT
Start: 2019-04-26 | End: 2019-04-26

## 2019-04-26 RX ORDER — ONDANSETRON 4 MG/1
4-8 TABLET, ORALLY DISINTEGRATING ORAL EVERY 8 HOURS PRN
Qty: 60 TABLET | Refills: 1 | Status: SHIPPED | OUTPATIENT
Start: 2019-04-26 | End: 2019-08-09

## 2019-04-26 ASSESSMENT — MIFFLIN-ST. JEOR: SCORE: 1424.34

## 2019-04-26 NOTE — PROGRESS NOTES
[facility] OB Intake note  Subjective   38 year old woman presents to clinic for initiation of OB care. Patient's last menstrual period was 2019 (approximate).  at 8w2d by Estimated Date of Delivery: Dec 4, 2019 based on LMP. Reviewed dating ultrasound. Pregnancy is planned.      Symptoms since LMP include nausea and vomiting. Patient has tried these relief measures: diet modification, vitamin B-6 and Unisom, sea bands.    - Genetic/Infection questionnaire completed, risks include paternal grandmother with multiple SAB, AMA. Pt  does not have a recent known exposure to Parvo or CMV so IgG/IgM testing WILL NOT be ordered.   - Current Medications  Current Outpatient Medications   Medication Sig Dispense Refill     Cholecalciferol 4000 units CAPS        doxylamine (UNISOM) 25 MG TABS tablet Take 25 mg by mouth At Bedtime       Prenatal Vit-Fe Fumarate-FA (PRENATAL MULTIVITAMIN PLUS IRON) 27-0.8 MG TABS per tablet Take 1 tablet by mouth daily 100 tablet 3     Pyridoxine HCl (VITAMIN B-6 PO)        UNABLE TO FIND IS Clinical Pro-Hil Serum daily   Epionce Face Cream daily           - Co-morbids  Past Medical History:   Diagnosis Date     Fibroadenoma of breast, left     bx  benign has clips in place      Psoriasis      - Risk for GDM -  does not  have Personal history of GDM, BMI>30, h/o prediabetes/glucose intolerance, first degree relative with GDM or DM WILL NOT have an early GCT and possible Hgb A1C  - High Risk for Pre E- meets one of following criteria The patient does not h/o Pre Eclampsia, Current multi fetal gestation, Pre Gestational Diabetes (Type 1 or Type 2), chronic hypertension, renal disease, Autoimmune disease (systematic lupus erythematosus, antiphospholipid syndrome) so WILL NOT start low dose aspirin (81mg) starting between 12 and 28 weeks to prevent early onset preeclampsia.  - The patient  does not have a history of spontaneous  birth so  WILL NOT consider progesterone starting at  16-20 weeks and/or serial transvaginal cervical length ultrasounds from 16-24 weeks.   -The patient does not have a history of immunosuppresion or HIV so Toxoplasma IgG/IgM WILL NOT be ordered.    PERSONAL/SOCIAL HISTORY  Lives with their family. FOB, Bertin, involved and supportive. Together 4 years.  Employment: Full time,  at [employer]. Her job involves light activity .  Additional items: None    Objective  -VS: reviewed and within normal limits   -General appearance: no acute distress, patient is comfortable   NEUROLOGICAL/PSYCHIATRIC   - Orientated x 3   -Mood and affect: normal     Assessment/Plan:  38 year old  8w2d weeks of pregnancy with DADIE of Dec 4, 2019 by LMP of Patient's last menstrual period was 2019 (approximate). Ultrasound confirms.   Orders Placed This Encounter   Procedures     Vitamin D Deficiency     CBC with platelets differential     Hepatitis B Surface Antibody     Hepatitis B surface antigen     Hepatitis C antibody     HIV Antigen Antibody Combo     Rubella Antibody IgG Quantitative     Treponema Abs w Reflex to RPR and Titer     [facility] REFERRAL-PREGNANCY     ABO/Rh type and screen     - Oriented to Practice, types of care, and how to reach a provider.   - Patient received 1st trimester new OB education packet complete with aide of The Expectant Family booklet including information on genetic screening test options.    - Patient desires 1st trimester screening which was ordered.  - Educational handout on the prevention of infections diseases during pregnancy provided.  - Patient was encouraged to start prenatal vitamins as tolerated.    - Patient was sent to lab for routine OB labs.  - Pregnancy concerns to be addressed by provider at new OB exam include: CNM vs MD care, GC/CT.  - Pt to RTO for NOB visit in 3 weeks and prn if questions or concerns

## 2019-04-26 NOTE — LETTER
Date:May 1, 2019      Patient was self referred, no letter generated. Do not send.        [facility] Health Information

## 2019-04-26 NOTE — LETTER
2019       RE: Ana Cleveland  [address]  [facility] [address]     Dear Colleague,    Thank you for referring your patient, Ana Cleveland, to the [facility] at [facility]. Please see a copy of my visit note below.    [facility] OB Intake note  Subjective   38 year old woman presents to clinic for initiation of OB care. Patient's last menstrual period was 2019 (approximate).  at 8w2d by Estimated Date of Delivery: Dec 4, 2019 based on LMP. Reviewed dating ultrasound. Pregnancy is planned.      Symptoms since LMP include nausea and vomiting. Patient has tried these relief measures: diet modification, vitamin B-6 and Unisom, sea bands.    - Genetic/Infection questionnaire completed, risks include paternal grandmother with multiple SAB, AMA. Pt  does not have a recent known exposure to Parvo or CMV so IgG/IgM testing WILL NOT be ordered.   - Current Medications  Current Outpatient Medications   Medication Sig Dispense Refill     Cholecalciferol 4000 units CAPS        doxylamine (UNISOM) 25 MG TABS tablet Take 25 mg by mouth At Bedtime       Prenatal Vit-Fe Fumarate-FA (PRENATAL MULTIVITAMIN PLUS IRON) 27-0.8 MG TABS per tablet Take 1 tablet by mouth daily 100 tablet 3     Pyridoxine HCl (VITAMIN B-6 PO)        UNABLE TO FIND IS Clinical Pro-Hil Serum daily   Epionce Face Cream daily           - Co-morbids  Past Medical History:   Diagnosis Date     Fibroadenoma of breast, left     bx  benign has clips in place      Psoriasis      - Risk for GDM -  does not  have Personal history of GDM, BMI>30, h/o prediabetes/glucose intolerance, first degree relative with GDM or DM WILL NOT have an early GCT and possible Hgb A1C  - High Risk for Pre E- meets one of following criteria The patient does not h/o Pre Eclampsia, Current multi fetal gestation, Pre Gestational Diabetes (Type 1 or Type 2), chronic hypertension, renal disease, Autoimmune disease  (systematic lupus erythematosus, antiphospholipid syndrome) so WILL NOT start low dose aspirin (81mg) starting between 12 and 28 weeks to prevent early onset preeclampsia.  - The patient  does not have a history of spontaneous  birth so  WILL NOT consider progesterone starting at 16-20 weeks and/or serial transvaginal cervical length ultrasounds from 16-24 weeks.   -The patient does not have a history of immunosuppresion or HIV so Toxoplasma IgG/IgM WILL NOT be ordered.    PERSONAL/SOCIAL HISTORY  Lives with their family. FOB, Bertin, involved and supportive. Together 4 years.  Employment: Full time,  at [employer]. Her job involves light activity .  Additional items: None    Objective  -VS: reviewed and within normal limits   -General appearance: no acute distress, patient is comfortable   NEUROLOGICAL/PSYCHIATRIC   - Orientated x 3   -Mood and affect: normal     Assessment/Plan:  38 year old  8w2d weeks of pregnancy with ADDIE of Dec 4, 2019 by LMP of Patient's last menstrual period was 2019 (approximate). Ultrasound confirms.   Orders Placed This Encounter   Procedures     Vitamin D Deficiency     CBC with platelets differential     Hepatitis B Surface Antibody     Hepatitis B surface antigen     Hepatitis C antibody     HIV Antigen Antibody Combo     Rubella Antibody IgG Quantitative     Treponema Abs w Reflex to RPR and Titer     [facility] REFERRAL-PREGNANCY     ABO/Rh type and screen     - Oriented to Practice, types of care, and how to reach a provider.   - Patient received 1st trimester new OB education packet complete with aide of The Expectant Family booklet including information on genetic screening test options.    - Patient desires 1st trimester screening which was ordered.  - Educational handout on the prevention of infections diseases during pregnancy provided.  - Patient was encouraged to start prenatal vitamins as tolerated.    - Patient was sent to lab for  routine OB labs.  - Pregnancy concerns to be addressed by provider at new OB exam include: CNM vs MD care, GC/CT.  - Pt to RTO for NOB visit in 3 weeks and prn if questions or concerns    Again, thank you for allowing me to participate in the care of your patient.      Sincerely,    ASHLEY RamirezM

## 2019-04-27 LAB
BACTERIA SPEC CULT: NORMAL
Lab: NORMAL
RUBV IGG SERPL IA-ACNC: 33 IU/ML
SPECIMEN SOURCE: NORMAL
T PALLIDUM AB SER QL: NONREACTIVE

## 2019-04-29 LAB
DEPRECATED CALCIDIOL+CALCIFEROL SERPL-MC: 52 UG/L (ref 20–75)
HBV SURFACE AB SERPL IA-ACNC: 99.66 M[IU]/ML
HBV SURFACE AG SERPL QL IA: NONREACTIVE
HCV AB SERPL QL IA: NONREACTIVE
HIV 1+2 AB+HIV1 P24 AG SERPL QL IA: NONREACTIVE

## 2019-05-15 ENCOUNTER — HOSPITAL ENCOUNTER (EMERGENCY)
Facility: CLINIC | Age: 39
Discharge: LEFT BEFORE DISCHARGE | End: 2019-05-15
Attending: FAMILY MEDICINE | Admitting: FAMILY MEDICINE
Payer: COMMERCIAL

## 2019-05-15 ENCOUNTER — OFFICE VISIT (OUTPATIENT)
Dept: OBGYN | Facility: CLINIC | Age: 39
End: 2019-05-15
Attending: ADVANCED PRACTICE MIDWIFE
Payer: COMMERCIAL

## 2019-05-15 VITALS
WEIGHT: 166.9 LBS | HEIGHT: 64 IN | DIASTOLIC BLOOD PRESSURE: 75 MMHG | BODY MASS INDEX: 28.49 KG/M2 | HEART RATE: 92 BPM | SYSTOLIC BLOOD PRESSURE: 110 MMHG

## 2019-05-15 DIAGNOSIS — O09.529 SUPERVISION OF HIGH-RISK PREGNANCY OF ELDERLY MULTIGRAVIDA: Primary | ICD-10-CM

## 2019-05-15 PROCEDURE — 87491 CHLMYD TRACH DNA AMP PROBE: CPT | Performed by: ADVANCED PRACTICE MIDWIFE

## 2019-05-15 PROCEDURE — G0463 HOSPITAL OUTPT CLINIC VISIT: HCPCS | Mod: ZF

## 2019-05-15 PROCEDURE — 87591 N.GONORRHOEAE DNA AMP PROB: CPT | Performed by: ADVANCED PRACTICE MIDWIFE

## 2019-05-15 ASSESSMENT — ENCOUNTER SYMPTOMS
NAUSEA: 1
ALTERED TEMPERATURE REGULATION: 0
FATIGUE: 1
ABDOMINAL PAIN: 0
HOT FLASHES: 0
NIGHT SWEATS: 0
RECTAL PAIN: 0
CHILLS: 0
BLOOD IN STOOL: 0
POLYDIPSIA: 0
WEIGHT LOSS: 0
BREAST PAIN: 1
JAUNDICE: 0
HEARTBURN: 0
INCREASED ENERGY: 1
BOWEL INCONTINENCE: 0
FEVER: 0
NAIL CHANGES: 0
POLYPHAGIA: 0
DECREASED APPETITE: 0
POOR WOUND HEALING: 0
DECREASED LIBIDO: 0
DIARRHEA: 0
BREAST MASS: 0
HALLUCINATIONS: 0
VOMITING: 1
WEIGHT GAIN: 0
CONSTIPATION: 1
BLOATING: 1
SKIN CHANGES: 0

## 2019-05-15 ASSESSMENT — ANXIETY QUESTIONNAIRES
GAD7 TOTAL SCORE: 1
GAD7 TOTAL SCORE: 1
1. FEELING NERVOUS, ANXIOUS, OR ON EDGE: NOT AT ALL
4. TROUBLE RELAXING: NOT AT ALL
3. WORRYING TOO MUCH ABOUT DIFFERENT THINGS: NOT AT ALL
7. FEELING AFRAID AS IF SOMETHING AWFUL MIGHT HAPPEN: NOT AT ALL
2. NOT BEING ABLE TO STOP OR CONTROL WORRYING: NOT AT ALL
5. BEING SO RESTLESS THAT IT IS HARD TO SIT STILL: NOT AT ALL
6. BECOMING EASILY ANNOYED OR IRRITABLE: SEVERAL DAYS
7. FEELING AFRAID AS IF SOMETHING AWFUL MIGHT HAPPEN: NOT AT ALL
6. BECOMING EASILY ANNOYED OR IRRITABLE: SEVERAL DAYS
GAD7 TOTAL SCORE: 1
1. FEELING NERVOUS, ANXIOUS, OR ON EDGE: NOT AT ALL
3. WORRYING TOO MUCH ABOUT DIFFERENT THINGS: NOT AT ALL
7. FEELING AFRAID AS IF SOMETHING AWFUL MIGHT HAPPEN: NOT AT ALL
2. NOT BEING ABLE TO STOP OR CONTROL WORRYING: NOT AT ALL
5. BEING SO RESTLESS THAT IT IS HARD TO SIT STILL: NOT AT ALL

## 2019-05-15 ASSESSMENT — PATIENT HEALTH QUESTIONNAIRE - PHQ9: 5. POOR APPETITE OR OVEREATING: NOT AT ALL

## 2019-05-15 ASSESSMENT — MIFFLIN-ST. JEOR: SCORE: 1432.05

## 2019-05-15 NOTE — LETTER
5/15/2019       RE: Ana Cleveland  [address]  [facility] [address]     Dear Colleague,    Thank you for referring your patient, Ana Cleveland, to the [facility] at [facility]. Please see a copy of my visit note below.    SUBJECTIVE:   Ana is a 38 year old female who presents to clinic for a new OB visit.   at 11w0d with Estimated Date of Delivery: Dec 4, 2019 based on LMP, c/w dating ultrasound. Feels well. Has continued PNV.     She has not had bleeding since her LMP.   She has had nausea with emesis 3-5 times weekly. Weight loss has not occurred.   This was a planned pregnancy.   FOB is involved,  Bertin.      OTHER CONCERNS:   - Nausea. Never started Zofran stating she was hoping it'd resolve. Discussed safety profile and to start as needed. B6 and Unisom did not decrease nausea, did help with sleep.   - Road trip to [place] for anniversary. Leaving in morning. Discussed fluids and movement.     ===========================================   ROS: 10 point ROS neg other than the symptoms noted above in the HPI.      PSYCHIATRIC:  Denies mood changes, difficulty concentrating, depression, anxiety, panic attacks or post partum depression  PHQ-9 score:    PHQ-9 SCORE 2018   PHQ-9 Total Score 3     LAURIE-7 SCORE 2018 5/15/2019 5/15/2019   Total Score - - 1 (minimal anxiety)   Total Score 0 1 1         Past History:  Her past medical history   Past Medical History:   Diagnosis Date     Fibroadenoma of breast, left 2016    bx  benign has clips in place      Psoriasis    .   She has a history of  miscarriage- diagnosed at 1st trimester screening  Since her last LMP she denies use of alcohol, tobacco and street drugs.  HISTORY:  Family History   Problem Relation Age of Onset     Skin Cancer Mother         basal, squamous cell      Skin Cancer Father         melanoma , squamous cell and basal cell      Skin Cancer Maternal Grandmother       Skin Cancer Maternal Grandfather      Skin Cancer Paternal Grandmother      Glaucoma Brother         dx  at birth  glaucoma  Sturge Colt syndrome        Neurofibromatosis Nephew         dx as a baby  brain tumors surgery age 3      Other - See Comments Niece 11        Graves Diesase     Social History     Socioeconomic History     Marital status:      Spouse name: Bertin      Number of children: 0     Years of education: 16     Highest education level: Not on file   Occupational History     Occupation:       Comment: [employer]    Social Needs     Financial resource strain: Not on file     Food insecurity:     Worry: Not on file     Inability: Not on file     Transportation needs:     Medical: Not on file     Non-medical: Not on file   Tobacco Use     Smoking status: Never Smoker     Smokeless tobacco: Never Used   Substance and Sexual Activity     Alcohol use: Not Currently     Comment: 1 drink/wk     Drug use: No     Sexual activity: Yes     Partners: Male     Birth control/protection: None   Lifestyle     Physical activity:     Days per week: Not on file     Minutes per session: Not on file     Stress: Not on file   Relationships     Social connections:     Talks on phone: Not on file     Gets together: Not on file     Attends Rastafari service: Not on file     Active member of club or organization: Not on file     Attends meetings of clubs or organizations: Not on file     Relationship status: Not on file     Intimate partner violence:     Fear of current or ex partner: Not on file     Emotionally abused: Not on file     Physically abused: Not on file     Forced sexual activity: Not on file   Other Topics Concern     Not on file   Social History Narrative    Exercise 30min walking daily     Kettle bell weights     Caffeine      Current Outpatient Medications   Medication Sig     Cholecalciferol 4000 units CAPS      doxylamine (UNISOM) 25 MG TABS tablet Take 25 mg by mouth At Bedtime  "    Prenatal Vit-Fe Fumarate-FA (PRENATAL MULTIVITAMIN PLUS IRON) 27-0.8 MG TABS per tablet Take 1 tablet by mouth daily     Pyridoxine HCl (VITAMIN B-6 PO)      UNABLE TO FIND IS Clinical Pro-Hil Serum daily   Epionce Face Cream daily     ondansetron (ZOFRAN ODT) 4 MG ODT tab Take 1-2 tablets (4-8 mg) by mouth every 8 hours as needed for nausea (Patient not taking: Reported on 5/15/2019)     No current facility-administered medications for this visit.      No Known Allergies    ============================================  MEDICAL HISTORY  Past Medical History:   Diagnosis Date     Fibroadenoma of breast, left     bx  benign has clips in place      Psoriasis      Past Surgical History:   Procedure Laterality Date     DENTAL SURGERY      [place] teeth     DILATION AND CURETTAGE SUCTION N/A 2018    Procedure: DILATION AND CURETTAGE SUCTION   (12 WEEKS);  Surgeon: Tyesha Anthony MD;  Location:  OR     US BREAST BIOPSY LT         OB History    Para Term  AB Living   2 0 0 0 1 0   SAB TAB Ectopic Multiple Live Births   1 0 0 0 0      # Outcome Date GA Lbr Mauro/2nd Weight Sex Delivery Anes PTL Lv   2 Current            1 SAB 18 12w5d    AB, MISSED         Birth Comments: D&C to be scheduled . Desires genetics         GYN History- Last pap NIL/HPV neg 3/31/16    Abnormal Pap Smears: colposcopy 2009- normal since                        Cervical procedures: none                        History of STI: none     I personally reviewed the past social/family/medical and surgical history on the date of service.   I reviewed lab work done at Intake visit with patient.    EXAM:  /75 (BP Location: Left arm, Patient Position: Chair)   Pulse 92   Ht 1.626 m (5' 4\")   Wt 75.7 kg (166 lb 14.4 oz)   LMP 2019 (Approximate)   BMI 28.65 kg/m      EXAM:  GENERAL:  Pleasant pregnant female, alert, cooperative and well groomed.  SKIN:  Warm and dry, without lesions or " rashes  HEAD: Symmetrical features.  MOUTH:  Buccal mucosa pink, moist without lesions.  Teeth in good repair.    NECK:  Thyroid without enlargement and nodules.  Lymph nodes not palpable.   LUNGS:  Clear to auscultation.  BREAST:    No dominant, fixed or suspicious masses are noted.  No skin or nipple changes or axillary nodes.   Nipples everted.      HEART:  RRR without murmur.  ABDOMEN: Soft without masses , tenderness or organomegaly.  No CVA tenderness.  Uterus not palpable under pubic symphysis. No scars noted.. Fetal heart tones not present via doppler.  MUSCULOSKELETAL:  Full range of motion  EXTREMITIES:  No edema. No significant varicosities.   PELVIC EXAM:  Deferred per pt preference  WET PREP:Not done  GC/CHLAMYDIA CULTURE OBTAINED:YES, urine sample left    ASSESSMENT:  38 year old , 11w0d weeks of pregnancy with ADDIE of Dec 4, 2019 by LMP, US confirms  Intrauterine pregnancy, unknown viability   Genetic Screening: First Trimester Screen    ICD-10-CM    1. Supervision of high-risk pregnancy of elderly multigravida O09.529 Chlamydia PCR (Clinic Collect)     Gonorrhea PCR   2. Fetal heartbeat not heard O76 US OB <14 Weeks w Transvaginal Single       PLAN:  - Reviewed use of triage nurse line and contacting the on-call provider after hours for an urgent need such as fever, vagina bleeding, bladder or vaginal infection, rupture of membranes,  or term labor.    - Reviewed best evidence for: weight gain for her weight and height for pregnancy:  Based on pre-pregnancy weight of 162 and Body mass index is 28.65 kg/m . RECOMMENDED WEIGHT GAIN: 15-25 lbs.  - Reviewed healthy diet and foods to avoid; exercise and activity during pregnancy; avoiding exposure to toxoplasmosis; and maintenance of a generally healthy lifestyle.   - Discussed the harms, benefits, side effects and alternative therapies for current prescribed and OTC medications.  - All pt's and partner's questions discussed and answered.  Pt  "verbalized understanding of and agreement to plan of care.     - OBI labs reviewed.  - GC/CT collected.  - Pap with hpv cotesting due 5/2021.  - Unable to auscultate FHTs via doppler. Attempted by ELOISE, LILY and Dr. Anthony. Pt crying, upset, nervous rocael. In setting on previous MAB.   In office US technician gone for day. Call made to radiology, no appts available today. Pt and partner want US today. Per MD recommendation, pt sent to Adult ED to verify viability of pregnancy.  - Continue scheduled prenatal care, RTC in 4 weeks and prn if questions or concerns    I, LILY Keen, am serving as a scribe to document services personally performed by CNM based on the provider's statements to me.\" - LILY Keen    The encounter was performed by me and scribed by the SNM. The scribed note accurately reflects my personal services and decisions made by me.     ASHLEY Pitts CNM     Addendum:  FHTs confirmed by Dr. Pruitt via BSUS.     ASHLEY Pitts CNM     Again, thank you for allowing me to participate in the care of your patient.      Sincerely,    Luisa Velázquez CNM      "

## 2019-05-15 NOTE — LETTER
Date:May 29, 2019      Patient was self referred, no letter generated. Do not send.        [facility] Health Information

## 2019-05-15 NOTE — PROGRESS NOTES
SUBJECTIVE:   Ana is a 38 year old female who presents to clinic for a new OB visit.   at 11w0d with Estimated Date of Delivery: Dec 4, 2019 based on LMP, c/w dating ultrasound. Feels well. Has continued PNV.     She has not had bleeding since her LMP.   She has had nausea with emesis 3-5 times weekly. Weight loss has not occurred.   This was a planned pregnancy.   FOB is involved,  Bertin.      OTHER CONCERNS:   - Nausea. Never started Zofran stating she was hoping it'd resolve. Discussed safety profile and to start as needed. B6 and Unisom did not decrease nausea, did help with sleep.   - Road trip to [place] for anniversary. Leaving in morning. Discussed fluids and movement.     ===========================================   ROS: 10 point ROS neg other than the symptoms noted above in the HPI.      PSYCHIATRIC:  Denies mood changes, difficulty concentrating, depression, anxiety, panic attacks or post partum depression  PHQ-9 score:    PHQ-9 SCORE 2018   PHQ-9 Total Score 3     LAURIE-7 SCORE 2018 5/15/2019 5/15/2019   Total Score - - 1 (minimal anxiety)   Total Score 0 1 1         Past History:  Her past medical history   Past Medical History:   Diagnosis Date     Fibroadenoma of breast, left 2016    bx  benign has clips in place      Psoriasis    .   She has a history of  miscarriage- diagnosed at 1st trimester screening  Since her last LMP she denies use of alcohol, tobacco and street drugs.  HISTORY:  Family History   Problem Relation Age of Onset     Skin Cancer Mother         basal, squamous cell      Skin Cancer Father         melanoma , squamous cell and basal cell      Skin Cancer Maternal Grandmother      Skin Cancer Maternal Grandfather      Skin Cancer Paternal Grandmother      Glaucoma Brother         dx  at birth  glaucoma  Sturge Colt syndrome        Neurofibromatosis Nephew         dx as a baby  brain tumors surgery age 3      Other - See Comments Niece 11        Hermila  Diesase     Social History     Socioeconomic History     Marital status:      Spouse name: Bertin      Number of children: 0     Years of education: 16     Highest education level: Not on file   Occupational History     Occupation:       Comment: [employer]    Social Needs     Financial resource strain: Not on file     Food insecurity:     Worry: Not on file     Inability: Not on file     Transportation needs:     Medical: Not on file     Non-medical: Not on file   Tobacco Use     Smoking status: Never Smoker     Smokeless tobacco: Never Used   Substance and Sexual Activity     Alcohol use: Not Currently     Comment: 1 drink/wk     Drug use: No     Sexual activity: Yes     Partners: Male     Birth control/protection: None   Lifestyle     Physical activity:     Days per week: Not on file     Minutes per session: Not on file     Stress: Not on file   Relationships     Social connections:     Talks on phone: Not on file     Gets together: Not on file     Attends Voodoo service: Not on file     Active member of club or organization: Not on file     Attends meetings of clubs or organizations: Not on file     Relationship status: Not on file     Intimate partner violence:     Fear of current or ex partner: Not on file     Emotionally abused: Not on file     Physically abused: Not on file     Forced sexual activity: Not on file   Other Topics Concern     Not on file   Social History Narrative    Exercise 30min walking daily     Kettle bell weights     Caffeine      Current Outpatient Medications   Medication Sig     Cholecalciferol 4000 units CAPS      doxylamine (UNISOM) 25 MG TABS tablet Take 25 mg by mouth At Bedtime     Prenatal Vit-Fe Fumarate-FA (PRENATAL MULTIVITAMIN PLUS IRON) 27-0.8 MG TABS per tablet Take 1 tablet by mouth daily     Pyridoxine HCl (VITAMIN B-6 PO)      UNABLE TO FIND IS Clinical Pro-Hil Serum daily   Epionce Face Cream daily     ondansetron (ZOFRAN ODT) 4 MG ODT tab Take  "1-2 tablets (4-8 mg) by mouth every 8 hours as needed for nausea (Patient not taking: Reported on 5/15/2019)     No current facility-administered medications for this visit.      No Known Allergies    ============================================  MEDICAL HISTORY  Past Medical History:   Diagnosis Date     Fibroadenoma of breast, left     bx  benign has clips in place      Psoriasis      Past Surgical History:   Procedure Laterality Date     DENTAL SURGERY      [place] teeth     DILATION AND CURETTAGE SUCTION N/A 2018    Procedure: DILATION AND CURETTAGE SUCTION   (12 WEEKS);  Surgeon: Tyesha Anthony MD;  Location:  OR     US BREAST BIOPSY LT         OB History    Para Term  AB Living   2 0 0 0 1 0   SAB TAB Ectopic Multiple Live Births   1 0 0 0 0      # Outcome Date GA Lbr Mauro/2nd Weight Sex Delivery Anes PTL Lv   2 Current            1 SAB 18 12w5d    AB, MISSED         Birth Comments: D&C to be scheduled . [facility] genetics         GYN History- Last pap NIL/HPV neg 3/31/16    Abnormal Pap Smears: colposcopy 2009- normal since                        Cervical procedures: none                        History of STI: none     I personally reviewed the past social/family/medical and surgical history on the date of service.   I reviewed lab work done at Intake visit with patient.    EXAM:  /75 (BP Location: Left arm, Patient Position: Chair)   Pulse 92   Ht 1.626 m (5' 4\")   Wt 75.7 kg (166 lb 14.4 oz)   LMP 2019 (Approximate)   BMI 28.65 kg/m     EXAM:  GENERAL:  Pleasant pregnant female, alert, cooperative and well groomed.  SKIN:  Warm and dry, without lesions or rashes  HEAD: Symmetrical features.  MOUTH:  Buccal mucosa pink, moist without lesions.  Teeth in good repair.    NECK:  Thyroid without enlargement and nodules.  Lymph nodes not palpable.   LUNGS:  Clear to auscultation.  BREAST:    No dominant, fixed or suspicious masses are noted.  No " skin or nipple changes or axillary nodes.   Nipples everted.      HEART:  RRR without murmur.  ABDOMEN: Soft without masses , tenderness or organomegaly.  No CVA tenderness.  Uterus not palpable under pubic symphysis. No scars noted.. Fetal heart tones not present via doppler.  MUSCULOSKELETAL:  Full range of motion  EXTREMITIES:  No edema. No significant varicosities.   PELVIC EXAM:  Deferred per pt preference  WET PREP:Not done  GC/CHLAMYDIA CULTURE OBTAINED:YES, urine sample left    ASSESSMENT:  38 year old , 11w0d weeks of pregnancy with ADDIE of Dec 4, 2019 by LMP, US confirms  Intrauterine pregnancy, unknown viability   Genetic Screening: First Trimester Screen    ICD-10-CM    1. Supervision of high-risk pregnancy of elderly multigravida O09.529 Chlamydia PCR (Clinic Collect)     Gonorrhea PCR   2. Fetal heartbeat not heard O76 US OB <14 Weeks w Transvaginal Single       PLAN:  - Reviewed use of triage nurse line and contacting the on-call provider after hours for an urgent need such as fever, vagina bleeding, bladder or vaginal infection, rupture of membranes,  or term labor.    - Reviewed best evidence for: weight gain for her weight and height for pregnancy:  Based on pre-pregnancy weight of 162 and Body mass index is 28.65 kg/m . RECOMMENDED WEIGHT GAIN: 15-25 lbs.  - Reviewed healthy diet and foods to avoid; exercise and activity during pregnancy; avoiding exposure to toxoplasmosis; and maintenance of a generally healthy lifestyle.   - Discussed the harms, benefits, side effects and alternative therapies for current prescribed and OTC medications.  - All pt's and partner's questions discussed and answered.  Pt verbalized understanding of and agreement to plan of care.     - OBI labs reviewed.  - GC/CT collected.  - Pap with hpv cotesting due 2021.  - Unable to auscultate FHTs via doppler. Attempted by CNM, SNM and Dr. Anthony. Pt crying, upset, nervous rocael. In setting on previous MAB.   In  "office US technician gone for day. Call made to radiology, no appts available today. Pt and partner want US today. Per MD recommendation, pt sent to Adult ED to verify viability of pregnancy.  - Continue scheduled prenatal care, RTC in 4 weeks and prn if questions or concerns    I, LILY Keen, am serving as a scribe to document services personally performed by CNM based on the provider's statements to me.\" - LILY Keen    The encounter was performed by me and scribed by the SNM. The scribed note accurately reflects my personal services and decisions made by me.     ASHLEY Pitts CNM     Addendum:  FHTs confirmed by Dr. Pruitt via [facility].     ASHLEY Pitts CNM   "

## 2019-05-16 LAB
C TRACH DNA SPEC QL NAA+PROBE: NEGATIVE
N GONORRHOEA DNA SPEC QL NAA+PROBE: NEGATIVE
SPECIMEN SOURCE: NORMAL
SPECIMEN SOURCE: NORMAL

## 2019-05-16 ASSESSMENT — ANXIETY QUESTIONNAIRES: GAD7 TOTAL SCORE: 1

## 2019-05-19 NOTE — ED PROVIDER NOTES
History   No chief complaint on file.    HPI  Ana Cleveland is a 38 year old female who was at gyn clinic now. Sent to the ed for us. Apparently ob physician and nurse practioner could not get heart tones in late 1st trimester. Therefore she sent to the ed.    I have reviewed the Medications, Allergies, Past Medical and Surgical History, and Social History in the Epic system.    Review of Systems    Physical Exam          Physical Exam    ED Course        Procedures          Labs Ordered and Resulted from Time of ED Arrival Up to the Time of Departure from the ED - No data to display         Assessments & Plan (with Medical Decision Making)   Pt left in triage. Apparently did not want to wait. Not seen by MD    I have reviewed the nursing notes.    I have reviewed the findings, diagnosis, plan and need for follow up with the patient.       Medication List      There are no discharge medications for this visit.         Final diagnoses:   None       5/15/2019   [facility], [place], EMERGENCY DEPARTMENT     Leonard Hollis MD  05/19/19 [id number]

## 2019-05-22 ENCOUNTER — PRE VISIT (OUTPATIENT)
Dept: MATERNAL FETAL MEDICINE | Facility: CLINIC | Age: 39
End: 2019-05-22

## 2019-05-24 ENCOUNTER — OFFICE VISIT (OUTPATIENT)
Dept: MATERNAL FETAL MEDICINE | Facility: CLINIC | Age: 39
End: 2019-05-24
Attending: ADVANCED PRACTICE MIDWIFE
Payer: COMMERCIAL

## 2019-05-24 ENCOUNTER — HOSPITAL ENCOUNTER (OUTPATIENT)
Dept: ULTRASOUND IMAGING | Facility: CLINIC | Age: 39
Discharge: HOME | End: 2019-05-24
Attending: ADVANCED PRACTICE MIDWIFE | Admitting: ADVANCED PRACTICE MIDWIFE
Payer: COMMERCIAL

## 2019-05-24 DIAGNOSIS — O26.90 PREGNANCY RELATED CONDITION, ANTEPARTUM: ICD-10-CM

## 2019-05-24 DIAGNOSIS — O09.521 SUPERVISION OF ELDERLY MULTIGRAVIDA IN FIRST TRIMESTER: ICD-10-CM

## 2019-05-24 DIAGNOSIS — O09.519 ADVANCED MATERNAL AGE, PRIMIGRAVIDA, ANTEPARTUM: Primary | ICD-10-CM

## 2019-05-24 DIAGNOSIS — O09.521 SUPERVISION OF ELDERLY MULTIGRAVIDA IN FIRST TRIMESTER: Primary | ICD-10-CM

## 2019-05-24 PROCEDURE — 76813 OB US NUCHAL MEAS 1 GEST: CPT

## 2019-05-24 PROCEDURE — 96040 ZZH GENETIC COUNSELING, EACH 30 MINUTES: CPT | Mod: ZF | Performed by: GENETIC COUNSELOR, MS

## 2019-05-24 PROCEDURE — 36415 COLL VENOUS BLD VENIPUNCTURE: CPT | Performed by: OBSTETRICS & GYNECOLOGY

## 2019-05-24 PROCEDURE — 40000791 ZZHCL STATISTIC VERIFI PRENATAL TRISOMY 21,18,13: Performed by: OBSTETRICS & GYNECOLOGY

## 2019-05-24 NOTE — PROGRESS NOTES
[facility]  Genetic Counseling Consult    Patient: Ana Cleveland YOB: 1980   Date of Service: 19      Ana Cleveland was seen at [facility] for genetic consultation to discuss the options for screening and testing for fetal chromosome abnormalities.  The indication for genetic counseling is advanced maternal age.        Impression/Plan:   1.  Ana had an ultrasound and blood draw for NIPT (Innatal test through [facility]).  Results are expected within 7-10 days, and will be available in [employer].  We will contact her to discuss the results, and a copy will be forwarded to the office of the referring OB provider. Ana requested a detailed message with results on her voicemail ([phone]). She does want fetal sex information on her voicemail.     2.  Maternal serum AFP (single marker screen) is recommended after 15 weeks to screen for open neural tube defects. A quad screen should not be performed.    3.  An 18-20 week comprehensive ultrasound is standard of care for all women 35 or older at delivery.    Pregnancy History:   /Parity:    Age at Delivery: 39 year old  ADDIE: 2019, by Last Menstrual Period  Gestational Age: 12w2d    No significant complications or exposures were reported in the current pregnancy.    Pregnancy history:  o 2018: MAB       Family History:   A three-generation pedigree was obtained at a previous [facility] appointment and was updated today, and is scanned under the  Media  tab.   The following significant findings were reported by Ana:     Ana's nephew is currently 7 years old and has a diagnosis of Neurofibromatosis type I (NF). He required surgery for several brain tumors at age 3. NF is a genetic condition that can be inherited in an autosomal dominant pattern. Ana believes that her nephew's genetic mutation is de leonard.    Ana's partner, Bertin, has a family history of  early onset breast cancer. His maternal grandmother  from breast cancer in her 40s. Reportedly, genetic testing has been completed in the family with no BRCA mutation identified.     Otherwise, the reported family history is negative for multiple miscarriages, stillbirths, birth defects, mental retardation, known genetic conditions, and consanguinity.       Risk Assessment for Chromosome Conditions:   We explained that the risk for fetal chromosome abnormalities increases with maternal age. We discussed specific features of common chromosome abnormalities, including Down syndrome, trisomy 13, trisomy 18, and sex chromosome trisomies.      At age 39 at delivery, the midtrimester risk to have a baby with Down syndrome is 1 in 168.     At age 39 at delivery, the midtrimester risk to have a baby with any chromosome abnormality is 1 in 82.        Testing Options:   We discussed the following options:   First trimester screening    First trimester ultrasound with nuchal translucency and nasal bone assessments, maternal plasma hCG, SANDIE-A, and AFP measurement    Screens for fetal trisomy 21, trisomy 13, and trisomy 18    Cannot screen for open neural tube defects; maternal serum AFP after 15 weeks is recommended     Non-invasive Prenatal Testing (NIPT)    Maternal plasma cell-free DNA testing; first trimester ultrasound with nuchal translucency and nasal bone assessment is recommended, when appropriate    Screens for fetal trisomy 21, trisomy 13, trisomy 18, and sex chromosome aneuploidy    Cannot screen for open neural tube defects; maternal serum AFP after 15 weeks is recommended     Chorionic villus sampling (CVS)    Invasive procedure typically performed in the first trimester by which placental villi are obtained for the purpose of chromosome analysis and/or other prenatal genetic analysis    Diagnostic results; >99% sensitivity for fetal chromosome abnormalities    Cannot test for open neural tube defects;  maternal serum AFP after 15 weeks is recommended     Genetic Amniocentesis    Invasive procedure typically performed in the second trimester by which amniotic fluid is obtained for the purpose of chromosome analysis and/or other prenatal genetic analysis    Diagnostic results; >99% sensitivity for fetal chromosome abnormalities    AFAFP measurement tests for open neural tube defects     Comprehensive (Level II) ultrasound: Detailed ultrasound performed between 18-22 weeks gestation to screen for major birth defects and markers for aneuploidy.    We reviewed the benefits and limitations of this testing.  Screening tests provide a risk assessment specific to the pregnancy for certain fetal chromosome abnormalities, but cannot definitively diagnose or exclude a fetal chromosome abnormality.  Follow-up genetic counseling and consideration of diagnostic testing is recommended with any abnormal screening result.     Diagnostic tests carry inherent risks- including risk of miscarriage- that require careful consideration.  These tests can detect fetal chromosome abnormalities with greater than 99% certainty.  Results can be compromised by maternal cell contamination or mosaicism, and are limited by the resolution of cytogenetic G-banding technology.  There is no screening nor diagnostic test that can detect all forms of birth defects or mental disability.     It was a pleasure to be involved with Aan johnson. Face-to-face time of the meeting was 25 minutes.    Renate Garcia MS, [facility]  Maternal Fetal Medicine  [facility]  Phone:[phone]  Email: [email or web address]

## 2019-05-31 ENCOUNTER — TELEPHONE (OUTPATIENT)
Dept: MATERNAL FETAL MEDICINE | Facility: CLINIC | Age: 39
End: 2019-05-31

## 2019-05-31 NOTE — TELEPHONE ENCOUNTER
Left a message for Ana  Regarding normal NIPT results. Results indicate the expected amount of pregnancy DNA for chromosomes 21, 18, 13, and the sex chromosomes (XX). Fetal sex (female) was disclosed in voicemail per patient wishes. This puts her current pregnancy at low risk for Down syndrome, trisomy 18, trisomy 13 and sex chromosome abnormalities. This test is reported to have the following sensitivities: Down syndrome- 99%, trisomy 18- 98%, and trisomy 13- 98%. Although these results are reassuring, this does not replace a standard chromosome analysis from a chorionic villus sampling or amniocentesis.     Plan: Level II ultrasound is scheduled on July 12, 2019. MSAFP is the appropriate second trimester screening test for open neural tube defects; the maternal quad screen is not recommended. Her results are available in her Epic chart for her primary OB to review.    Renate Garcia MS, [facility]  Maternal Fetal Medicine  [phone]

## 2019-06-11 LAB — LAB SCANNED RESULT: NORMAL

## 2019-06-19 ENCOUNTER — ANCILLARY PROCEDURE (OUTPATIENT)
Dept: ULTRASOUND IMAGING | Facility: CLINIC | Age: 39
End: 2019-06-19
Attending: ADVANCED PRACTICE MIDWIFE
Payer: COMMERCIAL

## 2019-06-19 ENCOUNTER — OFFICE VISIT (OUTPATIENT)
Dept: OBGYN | Facility: CLINIC | Age: 39
End: 2019-06-19
Attending: ADVANCED PRACTICE MIDWIFE
Payer: COMMERCIAL

## 2019-06-19 VITALS
DIASTOLIC BLOOD PRESSURE: 72 MMHG | HEIGHT: 64 IN | SYSTOLIC BLOOD PRESSURE: 108 MMHG | BODY MASS INDEX: 29.65 KG/M2 | WEIGHT: 173.7 LBS | HEART RATE: 86 BPM

## 2019-06-19 DIAGNOSIS — O92.70 LACTATION DISORDER: ICD-10-CM

## 2019-06-19 DIAGNOSIS — O09.529 SUPERVISION OF HIGH-RISK PREGNANCY OF ELDERLY MULTIGRAVIDA: Primary | ICD-10-CM

## 2019-06-19 PROCEDURE — G0463 HOSPITAL OUTPT CLINIC VISIT: HCPCS | Mod: ZF

## 2019-06-19 PROCEDURE — 76815 OB US LIMITED FETUS(S): CPT

## 2019-06-19 RX ORDER — BREAST PUMP
1 EACH MISCELLANEOUS PRN
Qty: 1 EACH | Refills: 0 | Status: SHIPPED | OUTPATIENT
Start: 2019-06-19 | End: 2019-08-09

## 2019-06-19 ASSESSMENT — MIFFLIN-ST. JEOR: SCORE: 1462.9

## 2019-06-19 NOTE — LETTER
"2019       RE: Ana Cleveland  [address]  [place] [place] [address]     Dear Colleague,    Thank you for referring your patient, Ana Cleveland, to the [facility] at [facility]. Please see a copy of my visit note below.    Subjective:     38 year old  at 16w0d presents for a routine prenatal appointment.         Denies cramping/contractions, vaginal bleeding, discharge or leakage of fluid.No fetal movement yet.   No HA, vision changes, ruq/epigastric pain.       Patient concerns: Feeling well overall. Having some nausea after eating but no episode of emesis in the last week.   Had ultrasound today- FHR 141bpm. Pt was anxious d/t history of miscarriage. Had normal NIPT testing- having a girl! Level 2 ultrasound is scheduled for 19.     Needs [place] paperwork signed.  Would like breast pump prescription.     Objective:  Vitals:    19 1544   BP: 108/72   BP Location: Left arm   Patient Position: Chair   Pulse: 86   Weight: 78.8 kg (173 lb 11.2 oz)   Height: 1.626 m (5' 4\")       See OB flowsheet    Ultrasound: FHR 141bpm    Assessment/Plan     Encounter Diagnoses   Name Primary?     Supervision of high-risk pregnancy of elderly multigravida Yes     Lactation disorder        Orders Placed This Encounter   Medications     Misc. Devices (BREAST PUMP) MISC     Si each as needed (as needed)     Dispense:  1 each     Refill:  0     Breast pump of pt's choice       - Reviewed total weight gain, encouraged continued healthy diet and exercise.      - Reviewed why/how to contact provider.      Patient education/orders or handouts today:  PTL signs/symptoms, fetal movement and level 2 u/s scheduled    LA paperwork signed and given to patient.  Breast pump prescription given.   Level 2 ultrasound scheduled for 19.     Continue scheduled prenatal care, RTC in 4 weeks and prn if questions or concerns.      ASHLEY Pitts, ELOISE "

## 2019-06-19 NOTE — PROGRESS NOTES
"Subjective:      38 year old  at 16w0d presents for a routine prenatal appointment.         Denies cramping/contractions, vaginal bleeding, discharge or leakage of fluid.No fetal movement yet.   No HA, vision changes, ruq/epigastric pain.       Patient concerns: Feeling well overall. Having some nausea after eating but no episode of emesis in the last week.   Had ultrasound today- FHR 141bpm. Pt was anxious d/t history of miscarriage. Had normal NIPT testing- having a girl! Level 2 ultrasound is scheduled for 19.     Needs [place] paperwork signed.  Would like breast pump prescription.     Objective:  Vitals:    19 1544   BP: 108/72   BP Location: Left arm   Patient Position: Chair   Pulse: 86   Weight: 78.8 kg (173 lb 11.2 oz)   Height: 1.626 m (5' 4\")       See OB flowsheet    Ultrasound: FHR 141bpm    Assessment/Plan     Encounter Diagnoses   Name Primary?     Supervision of high-risk pregnancy of elderly multigravida Yes     Lactation disorder        Orders Placed This Encounter   Medications     Misc. Devices (BREAST PUMP) MISC     Si each as needed (as needed)     Dispense:  1 each     Refill:  0     Breast pump of pt's choice       - Reviewed total weight gain, encouraged continued healthy diet and exercise.      - Reviewed why/how to contact provider.      Patient education/orders or handouts today:  PTL signs/symptoms, fetal movement and level 2 u/s scheduled    LA paperwork signed and given to patient.  Breast pump prescription given.   Level 2 ultrasound scheduled for 19.     Continue scheduled prenatal care, RTC in 4 weeks and prn if questions or concerns.      ASHLEY Pitts, ELOISE   "

## 2019-06-25 ENCOUNTER — MEDICAL CORRESPONDENCE (OUTPATIENT)
Dept: HEALTH INFORMATION MANAGEMENT | Facility: CLINIC | Age: 39
End: 2019-06-25

## 2019-07-12 ENCOUNTER — OFFICE VISIT (OUTPATIENT)
Dept: OBGYN | Facility: CLINIC | Age: 39
End: 2019-07-12
Attending: ADVANCED PRACTICE MIDWIFE
Payer: COMMERCIAL

## 2019-07-12 ENCOUNTER — HOSPITAL ENCOUNTER (OUTPATIENT)
Dept: ULTRASOUND IMAGING | Facility: CLINIC | Age: 39
Discharge: HOME | End: 2019-07-12
Attending: OBSTETRICS & GYNECOLOGY | Admitting: OBSTETRICS & GYNECOLOGY
Payer: COMMERCIAL

## 2019-07-12 ENCOUNTER — OFFICE VISIT (OUTPATIENT)
Dept: MATERNAL FETAL MEDICINE | Facility: CLINIC | Age: 39
End: 2019-07-12
Attending: OBSTETRICS & GYNECOLOGY
Payer: COMMERCIAL

## 2019-07-12 VITALS
BODY MASS INDEX: 30.08 KG/M2 | HEIGHT: 64 IN | HEART RATE: 97 BPM | WEIGHT: 176.2 LBS | SYSTOLIC BLOOD PRESSURE: 112 MMHG | DIASTOLIC BLOOD PRESSURE: 74 MMHG

## 2019-07-12 DIAGNOSIS — O09.519 ADVANCED MATERNAL AGE, PRIMIGRAVIDA, ANTEPARTUM: ICD-10-CM

## 2019-07-12 DIAGNOSIS — O09.529 SUPERVISION OF HIGH-RISK PREGNANCY OF ELDERLY MULTIGRAVIDA: Primary | ICD-10-CM

## 2019-07-12 DIAGNOSIS — O09.519 ADVANCED MATERNAL AGE, PRIMIGRAVIDA, ANTEPARTUM: Primary | ICD-10-CM

## 2019-07-12 PROBLEM — O44.40 LOW-LYING PLACENTA: Status: ACTIVE | Noted: 2019-07-12

## 2019-07-12 PROCEDURE — G0463 HOSPITAL OUTPT CLINIC VISIT: HCPCS | Mod: ZF

## 2019-07-12 PROCEDURE — 36415 COLL VENOUS BLD VENIPUNCTURE: CPT | Performed by: ADVANCED PRACTICE MIDWIFE

## 2019-07-12 PROCEDURE — 76811 OB US DETAILED SNGL FETUS: CPT

## 2019-07-12 PROCEDURE — 82105 ALPHA-FETOPROTEIN SERUM: CPT | Performed by: ADVANCED PRACTICE MIDWIFE

## 2019-07-12 ASSESSMENT — MIFFLIN-ST. JEOR: SCORE: 1474.24

## 2019-07-12 NOTE — LETTER
"2019       RE: Ana Cleveland  [address]  [place] [place] [facility] [address]     Dear Colleague,    Thank you for referring your patient, Ana Cleveland, to the [facility] at [facility] Please see a copy of my visit note below.    Subjective:     38 year old  at 19w2d presents for a routine prenatal appointment.      Denies vaginal bleeding or leakage of fluid.  Denies contractions or cramping.  Possible fetal movement.       No HA, visual changes, RUQ or epigastric pain.   The patient presents with the following concerns: no questions or concerns, feeling well overall   Level II US  Scheduled.   Offered AFP, pt agreeable to testing.     Objective:  Vitals:    19 0848   BP: 112/74   BP Location: Left arm   Patient Position: Chair   Pulse: 97   Weight: 79.9 kg (176 lb 3.2 oz)   Height: 1.626 m (5' 4\")   See OB flowsheet    Assessment/Plan:  Encounter Diagnosis   Name Primary?     Supervision of high-risk pregnancy of elderly multigravida Yes     - Reviewed total weight gain, encouraged continued healthy diet and exercise.      - Reviewed why/how to contact provider.   - Patient education/orders or handouts today: PTL signs/symptoms, AFP only, Fetal movement and Level 2 u/s scheduled   - Level II jem today at 1015  - Agreeable to AFP today  - Return to clinic in 4-5 weeks and prn if questions or concerns.     Again, thank you for allowing me to participate in the care of your patient.      Sincerely,    ASHLEY Ramirez CNM      "

## 2019-07-12 NOTE — PROGRESS NOTES
"Subjective:     38 year old  at 19w2d presents for a routine prenatal appointment.      Denies vaginal bleeding or leakage of fluid.  Denies contractions or cramping.  Possible fetal movement.       No HA, visual changes, RUQ or epigastric pain.   The patient presents with the following concerns: no questions or concerns, feeling well overall   Level II US  Scheduled.   Offered AFP, pt agreeable to testing.     Objective:  Vitals:    19 0848   BP: 112/74   BP Location: Left arm   Patient Position: Chair   Pulse: 97   Weight: 79.9 kg (176 lb 3.2 oz)   Height: 1.626 m (5' 4\")   See OB flowsheet    Assessment/Plan:  Encounter Diagnosis   Name Primary?     Supervision of high-risk pregnancy of elderly multigravida Yes     - Reviewed total weight gain, encouraged continued healthy diet and exercise.      - Reviewed why/how to contact provider.   - Patient education/orders or handouts today: PTL signs/symptoms, AFP only, Fetal movement and Level 2 u/s scheduled   - Level II jem today at 1015  - Agreeable to AFP today  - Return to clinic in 4-5 weeks and prn if questions or concerns.   "

## 2019-07-12 NOTE — PROGRESS NOTES
"Please see \"Imaging\" tab under \"Chart Review\" for details of today's US.    Hilaria Nunez, DO    "

## 2019-07-15 ENCOUNTER — MEDICAL CORRESPONDENCE (OUTPATIENT)
Dept: HEALTH INFORMATION MANAGEMENT | Facility: CLINIC | Age: 39
End: 2019-07-15

## 2019-07-15 LAB
# FETUSES US: NORMAL
# FETUSES: NORMAL
AFP ADJ MOM AMN: 1.16
AFP SERPL-MCNC: 53 NG/ML
AGE - REPORTED: 37.1 YR
CURRENT SMOKER: NO
CURRENT SMOKER: NO
DIABETES STATUS PATIENT: NO
FAMILY MEMBER DISEASES HX: NO
FAMILY MEMBER DISEASES HX: NO
GA METHOD: NORMAL
GA METHOD: NORMAL
GA: NORMAL WK
IDDM PATIENT QL: NO
INTEGRATED SCN PATIENT-IMP: NORMAL
LMP START DATE: NORMAL
MONOCHORIONIC TWINS: NO
SERVICE CMNT-IMP: NO
SPECIMEN DRAWN SERPL: NORMAL
VALPROIC/CARBAMAZEPINE STATUS: NO
WEIGHT UNITS: NORMAL

## 2019-08-09 ENCOUNTER — OFFICE VISIT (OUTPATIENT)
Dept: OBGYN | Facility: CLINIC | Age: 39
End: 2019-08-09
Attending: ADVANCED PRACTICE MIDWIFE
Payer: COMMERCIAL

## 2019-08-09 VITALS
WEIGHT: 185 LBS | HEIGHT: 64 IN | BODY MASS INDEX: 31.58 KG/M2 | SYSTOLIC BLOOD PRESSURE: 104 MMHG | HEART RATE: 85 BPM | DIASTOLIC BLOOD PRESSURE: 70 MMHG

## 2019-08-09 DIAGNOSIS — O09.529 SUPERVISION OF HIGH-RISK PREGNANCY OF ELDERLY MULTIGRAVIDA: Primary | ICD-10-CM

## 2019-08-09 PROCEDURE — G0463 HOSPITAL OUTPT CLINIC VISIT: HCPCS | Mod: ZF

## 2019-08-09 ASSESSMENT — PAIN SCALES - GENERAL: PAINLEVEL: NO PAIN (0)

## 2019-08-09 ASSESSMENT — MIFFLIN-ST. JEOR: SCORE: 1514.4

## 2019-08-09 NOTE — PROGRESS NOTES
"Subjective:      38 year old  at 23w2d presents for a routine prenatal appointment with her  Bertin.    Denies vaginal bleeding or leakage of fluid.  Denies contractions or cramping.    Endorses fetal movement.       No HA, visual changes, RUQ or epigastric pain.   The patient presents with the following concerns: recommendations for sleep positions, ok to go to [facility] over labor day.   Level II US  Results reviewed normal scan.      Objective:  Vitals:    19 1519   BP: 104/70   Pulse: 85   Weight: 83.9 kg (185 lb)   Height: 1.626 m (5' 4.02\")   See OB flowsheet    Comp US:   ---------------------------------------------------------------------------------------------------------  1) Intrauterine pregnancy at 19 2/7 weeks gestational age.  2) None of the anomalies commonly detected by ultrasound were evident in the detailed fetal anatomic survey described above. No markers for aneuploidy were noted.  3) Growth parameters and estimated fetal weight were consistent with an appropriate for gestation age pattern of growth.  4) The amniotic fluid volume appeared normal.  5) The placenta is low lying.    Assessment/Plan     Encounter Diagnosis   Name Primary?     Supervision of high-risk pregnancy of elderly multigravida Yes     - Reviewed total weight gain, encouraged continued healthy diet and exercise.      - Reviewed why/how to contact provider.   - Order for repeat US for low-lying placenta at 28 wks.   Patient education/orders or handouts today:  PTL signs/symptoms, Fetal movement and Plan for EOB visit w labs  - Return to clinic in 4 weeks and prn if questions or concerns.     Paola Clemons CNM                "

## 2019-08-09 NOTE — LETTER
"2019       RE: Ana Cleveland  [address]  [place] [address]     Dear Colleague,    Thank you for referring your patient, Ana Cleveland, to the [facility] at [facility]. Please see a copy of my visit note below.    Subjective:      38 year old  at 23w2d presents for a routine prenatal appointment with her  Bertin.    Denies vaginal bleeding or leakage of fluid.  Denies contractions or cramping.    Endorses fetal movement.       No HA, visual changes, RUQ or epigastric pain.   The patient presents with the following concerns: recommendations for sleep positions, ok to go to [facility] over labor day.   Level II US  Results reviewed normal scan.      Objective:  Vitals:    19 1519   BP: 104/70   Pulse: 85   Weight: 83.9 kg (185 lb)   Height: 1.626 m (5' 4.02\")     See OB flowsheet  Comp US:   ---------------------------------------------------------------------------------------------------------  1) Intrauterine pregnancy at 19 2/7 weeks gestational age.  2) None of the anomalies commonly detected by ultrasound were evident in the detailed fetal anatomic survey described above. No markers for aneuploidy were noted.  3) Growth parameters and estimated fetal weight were consistent with an appropriate for gestation age pattern of growth.  4) The amniotic fluid volume appeared normal.  5) The placenta is low lying.    Assessment/Plan     Encounter Diagnosis   Name Primary?     Supervision of high-risk pregnancy of elderly multigravida Yes     - Reviewed total weight gain, encouraged continued healthy diet and exercise.      - Reviewed why/how to contact provider.   - Order for repeat US for low-lying placenta at 28 wks.   -Patient education/orders or handouts today:  -PTL signs/symptoms, Fetal movement and Plan for EOB visit w labs  - Return to clinic in 4 weeks and prn if questions or concerns.     Paola Clemons, " CNM

## 2019-08-09 NOTE — NURSING NOTE
Chief Complaint   Patient presents with     Prenatal Care   23.2weeks ob visit  Low back pain severe pain for last week.

## 2019-09-11 ENCOUNTER — OFFICE VISIT (OUTPATIENT)
Dept: OBGYN | Facility: CLINIC | Age: 39
End: 2019-09-11
Attending: ADVANCED PRACTICE MIDWIFE
Payer: COMMERCIAL

## 2019-09-11 ENCOUNTER — TELEPHONE (OUTPATIENT)
Dept: OBGYN | Facility: CLINIC | Age: 39
End: 2019-09-11

## 2019-09-11 ENCOUNTER — ANCILLARY PROCEDURE (OUTPATIENT)
Dept: ULTRASOUND IMAGING | Facility: CLINIC | Age: 39
End: 2019-09-11
Attending: MIDWIFE
Payer: COMMERCIAL

## 2019-09-11 VITALS
HEIGHT: 64 IN | DIASTOLIC BLOOD PRESSURE: 75 MMHG | SYSTOLIC BLOOD PRESSURE: 115 MMHG | WEIGHT: 189.1 LBS | HEART RATE: 97 BPM | BODY MASS INDEX: 32.28 KG/M2

## 2019-09-11 DIAGNOSIS — O09.529 SUPERVISION OF HIGH-RISK PREGNANCY OF ELDERLY MULTIGRAVIDA: Primary | ICD-10-CM

## 2019-09-11 DIAGNOSIS — O44.40 LOW-LYING PLACENTA: ICD-10-CM

## 2019-09-11 LAB
ERYTHROCYTE [DISTWIDTH] IN BLOOD BY AUTOMATED COUNT: 13.1 % (ref 10–15)
GLUCOSE 1H P 50 G GLC PO SERPL-MCNC: 105 MG/DL (ref 60–129)
HCT VFR BLD AUTO: 36.2 % (ref 35–47)
HGB BLD-MCNC: 11.9 G/DL (ref 11.7–15.7)
MCH RBC QN AUTO: 32.8 PG (ref 26.5–33)
MCHC RBC AUTO-ENTMCNC: 32.9 G/DL (ref 31.5–36.5)
MCV RBC AUTO: 100 FL (ref 78–100)
PLATELET # BLD AUTO: 241 10E9/L (ref 150–450)
RBC # BLD AUTO: 3.63 10E12/L (ref 3.8–5.2)
WBC # BLD AUTO: 11.3 10E9/L (ref 4–11)

## 2019-09-11 PROCEDURE — 82950 GLUCOSE TEST: CPT | Performed by: ADVANCED PRACTICE MIDWIFE

## 2019-09-11 PROCEDURE — 90471 IMMUNIZATION ADMIN: CPT | Mod: ZF

## 2019-09-11 PROCEDURE — 90715 TDAP VACCINE 7 YRS/> IM: CPT | Mod: ZF

## 2019-09-11 PROCEDURE — 76815 OB US LIMITED FETUS(S): CPT

## 2019-09-11 PROCEDURE — 25000128 H RX IP 250 OP 636: Mod: ZF

## 2019-09-11 PROCEDURE — G0463 HOSPITAL OUTPT CLINIC VISIT: HCPCS | Mod: 25

## 2019-09-11 PROCEDURE — 36415 COLL VENOUS BLD VENIPUNCTURE: CPT | Performed by: ADVANCED PRACTICE MIDWIFE

## 2019-09-11 PROCEDURE — 85027 COMPLETE CBC AUTOMATED: CPT | Performed by: ADVANCED PRACTICE MIDWIFE

## 2019-09-11 PROCEDURE — 86780 TREPONEMA PALLIDUM: CPT | Performed by: ADVANCED PRACTICE MIDWIFE

## 2019-09-11 PROCEDURE — 82306 VITAMIN D 25 HYDROXY: CPT | Performed by: ADVANCED PRACTICE MIDWIFE

## 2019-09-11 ASSESSMENT — ANXIETY QUESTIONNAIRES
3. WORRYING TOO MUCH ABOUT DIFFERENT THINGS: NOT AT ALL
1. FEELING NERVOUS, ANXIOUS, OR ON EDGE: NOT AT ALL
6. BECOMING EASILY ANNOYED OR IRRITABLE: NOT AT ALL
5. BEING SO RESTLESS THAT IT IS HARD TO SIT STILL: NOT AT ALL
7. FEELING AFRAID AS IF SOMETHING AWFUL MIGHT HAPPEN: NOT AT ALL
2. NOT BEING ABLE TO STOP OR CONTROL WORRYING: NOT AT ALL
GAD7 TOTAL SCORE: 0

## 2019-09-11 ASSESSMENT — PATIENT HEALTH QUESTIONNAIRE - PHQ9
5. POOR APPETITE OR OVEREATING: NOT AT ALL
SUM OF ALL RESPONSES TO PHQ QUESTIONS 1-9: 1

## 2019-09-11 ASSESSMENT — PAIN SCALES - GENERAL: PAINLEVEL: NO PAIN (0)

## 2019-09-11 ASSESSMENT — MIFFLIN-ST. JEOR: SCORE: 1532.75

## 2019-09-11 NOTE — PROGRESS NOTES
" 38 year old, , 28w0d, presents for EOB visit.    Patient concerns: Feeling well overall. Having a girl!   Took Meenakshi class Saturday - all in one. BP tour scheduled.    Ultrasound done today to reevaluate low lying placenta - resolved- edge >2cm from os. No further ultrasounds indicated.    Denies cramping/contractions, vaginal bleeding, discharge or leakage of fluid. Reports +fetal movement- much more consistent.  No HA, vision changes, ruq/epigastric pain.      PHQ-9 SCORE 2018   PHQ-9 Total Score 3 1     Ultrasound:  Fetal presentation = cepahlic.     MVP = 6.3cm.     Inferior placenta >2cm from internal os, no longer low lying.      FHR = 138bpm    ------    Education completed today includes breast feeding, [facility] hand out , contraception, counting movements, signs of pre-term labor, when to present to birthplace, post partum depression, GBS, getting enough iron and labor induction.  Birth preferences reviewed: Interested in epidural- States her plan is to \"show up and do what the professionals (ELOISE, MD, RN) say is right.\" Would want IOL at 41 weeks if undelivered.  Desires skin to skin, delayed cord clamping, dad to cut cord.  Agreeable to IV and admission labs. Agreeable AMTSL.  Labor support:       Feeding plans : Breastfeeding; already got breast pump   Contraception planned:  Undecided at this time, continue to discuss  The following labs were ordered today:     GCT, CBC w platelets, Vitamin d, Anti-treponema  Water birth consent form was not given- not interested.    Blood type:   ABO   Date Value Ref Range Status   2019 A  Final     RH(D)   Date Value Ref Range Status   2019 Pos  Final     Antibody Screen   Date Value Ref Range Status   2019 Neg  Final   Rhogam  was not given.  TDAP  was given.    A/P:  Encounter Diagnoses   Name Primary?     Supervision of high-risk pregnancy of elderly multigravida Yes     Low-lying placenta- RESOLVED 19  "     Orders Placed This Encounter   Procedures     TDAP VACCINE (BOOSTRIX)     Glucose 1 Hour     CBC with Platelets     Treponema Abs w Reflex to RPR and Titer     25- OH-Vitamin D     EOB education reviewed.  EOB labs ordered- 1 hour glucose, cbc with plts, anti-trep, vitamin D.   Reviewed normal ultrasound - low lying placenta now resolved.   Tdap given.  Declines flu vaccine today- offer at next visit.     Continue scheduled prenatal care, RTC in 2 weeks and prn if questions or concerns.      ASHLEY Pitts, CNM

## 2019-09-11 NOTE — LETTER
"2019       RE: Ana Cleveland  [address]  [place] [address]     Dear Colleague,    Thank you for referring your patient, Ana Cleveland, to the [facility] at [facility]. Please see a copy of my visit note below.     38 year old, , 28w0d, presents for EOB visit.    Patient concerns: Feeling well overall. Having a girl!   Took Meenakshi class Saturday - all in one. BP tour scheduled.    Ultrasound done today to reevaluate low lying placenta - resolved- edge >2cm from os. No further ultrasounds indicated.    Denies cramping/contractions, vaginal bleeding, discharge or leakage of fluid. Reports +fetal movement- much more consistent.  No HA, vision changes, ruq/epigastric pain.      PHQ-9 SCORE 2018   PHQ-9 Total Score 3 1     Ultrasound:  Fetal presentation = cepahlic.     MVP = 6.3cm.     Inferior placenta >2cm from internal os, no longer low lying.      FHR = 138bpm  ------  Education completed today includes breast feeding, [facility] hand out , contraception, counting movements, signs of pre-term labor, when to present to birthplace, post partum depression, GBS, getting enough iron and labor induction.  Birth preferences reviewed: Interested in epidural- States her plan is to \"show up and do what the professionals (MD ELOISE, RN) say is right.\" Would want IOL at 41 weeks if undelivered.  Desires skin to skin, delayed cord clamping, dad to cut cord.  Agreeable to IV and admission labs. Agreeable AMTSL.  Labor support:      [place] Feeding plans : Breastfeeding; already got breast pump   Contraception planned:  Undecided at this time, continue to discuss  The following labs were ordered today:     GCT, CBC w platelets, Vitamin d, Anti-treponema  Water birth consent form was not given- not interested.    Blood type:   ABO   Date Value Ref Range Status   2019 A  Final     RH(D)   Date Value Ref Range Status   2019 " Pos  Final     Antibody Screen   Date Value Ref Range Status   04/26/2019 Neg  Final   Rhogam  was not given.  TDAP  was given.    A/P:  Encounter Diagnoses   Name Primary?     Supervision of high-risk pregnancy of elderly multigravida Yes     Low-lying placenta- RESOLVED 9/11/19      Orders Placed This Encounter   Procedures     TDAP VACCINE (BOOSTRIX)     Glucose 1 Hour     CBC with Platelets     Treponema Abs w Reflex to RPR and Titer     25- OH-Vitamin D     EOB education reviewed.  EOB labs ordered- 1 hour glucose, cbc with plts, anti-trep, vitamin D.   Reviewed normal ultrasound - low lying placenta now resolved.   Tdap given.  Declines flu vaccine today- offer at next visit.     Continue scheduled prenatal care, RTC in 2 weeks and prn if questions or concerns.      ASHLEY Pitts, CNM

## 2019-09-11 NOTE — TELEPHONE ENCOUNTER
Returned call to patient to inform 1 hour glucose level normal. Pt has no further questions at this time

## 2019-09-11 NOTE — TELEPHONE ENCOUNTER
----- Message from Brooke Yadav sent at 9/11/2019  1:06 PM CDT -----  Regarding: Glucose test results  Contact: [phone]  [facility]    Phone Message    May a detailed message be left on voicemail: yes    Reason for Call: Requesting Results   Name/type of test: Glucose labs  Date of test: 9/11/19  Was test done at a location other than [facility] (Please fill in the location if not [facility])?: No    Thanks,  Brooke Yadav  Intake Representative   Call Center

## 2019-09-12 LAB
DEPRECATED CALCIDIOL+CALCIFEROL SERPL-MC: 73 UG/L (ref 20–75)
T PALLIDUM AB SER QL: NONREACTIVE

## 2019-09-12 ASSESSMENT — ANXIETY QUESTIONNAIRES: GAD7 TOTAL SCORE: 0

## 2019-09-18 PROBLEM — O44.40 LOW-LYING PLACENTA: Status: RESOLVED | Noted: 2019-07-12 | Resolved: 2019-09-18

## 2019-09-25 ENCOUNTER — OFFICE VISIT (OUTPATIENT)
Dept: OBGYN | Facility: CLINIC | Age: 39
End: 2019-09-25
Attending: ADVANCED PRACTICE MIDWIFE
Payer: COMMERCIAL

## 2019-09-25 VITALS
BODY MASS INDEX: 33.1 KG/M2 | SYSTOLIC BLOOD PRESSURE: 119 MMHG | HEIGHT: 64 IN | DIASTOLIC BLOOD PRESSURE: 84 MMHG | WEIGHT: 193.9 LBS | HEART RATE: 111 BPM

## 2019-09-25 DIAGNOSIS — O09.529 SUPERVISION OF HIGH-RISK PREGNANCY OF ELDERLY MULTIGRAVIDA: Primary | ICD-10-CM

## 2019-09-25 DIAGNOSIS — Z23 NEED FOR PROPHYLACTIC VACCINATION AND INOCULATION AGAINST INFLUENZA: ICD-10-CM

## 2019-09-25 PROCEDURE — 90686 IIV4 VACC NO PRSV 0.5 ML IM: CPT | Mod: ZF

## 2019-09-25 PROCEDURE — 25000128 H RX IP 250 OP 636: Mod: ZF

## 2019-09-25 PROCEDURE — G0008 ADMIN INFLUENZA VIRUS VAC: HCPCS | Mod: ZF

## 2019-09-25 PROCEDURE — G0463 HOSPITAL OUTPT CLINIC VISIT: HCPCS | Mod: ZF

## 2019-09-25 ASSESSMENT — MIFFLIN-ST. JEOR: SCORE: 1554.52

## 2019-09-25 ASSESSMENT — PAIN SCALES - GENERAL: PAINLEVEL: NO PAIN (0)

## 2019-09-25 NOTE — LETTER
"2019       RE: Ana Cleveland  [address]  [facility] [address]     Dear Colleague,    Thank you for referring your patient, Ana Cleveland, to the [facility] at [facility]. Please see a copy of my visit note below.    Subjective:      38 year old  at 30w0d presents for a routine prenatal appointment.   Denies vaginal bleeding or leakage of fluid.  denies contractions. Endorses fetal movement.      No HA, visual changes, RUQ or epigastric pain.   Patient concerns: Feeling well overall.   - Wondering if family needs to get Tdap and flu shot.     - Reviewed EOB labs with patient. Vit D 73, discussed decreased dosing of Vit D supplement.   - FOB's family with bigger babies (10#)  Reviewed TDAP Previously given     Objective:  Vitals:    19 0759   BP: 119/84   Pulse: 111   Weight: 88 kg (193 lb 14.4 oz)   Height: 1.626 m (5' 4\")   See ob flowsheet    Assessment/Plan:   Encounter Diagnosis   Name Primary?     Supervision of high-risk pregnancy of elderly multigravida Yes     Orders Placed This Encounter   Procedures     US OB > 14 Weeks     - Reviewed total weight gain, encouraged continued healthy diet and exercise.    - Reviewed importance of daily fetal kick count and why/how to contact provider.  - Reviewed why/how to contact provider if headache/visual changes/RUQ or epigastric pain, decreased fetal movement, vaginal bleeding, leakage of fluid or more than 4 contractions in an hour.  - Patient education/orders or handouts today: Flu shot, PTL signs/symptoms  - Flu shot given today  - Return to clinic in 2 weeks for MAY and growth US and prn if questions or concerns.     I, LILY Robbins, am serving as a scribe to document services personally performed by CNM based on the provider's statements to me. - LILY Robbins  The encounter was performed by me and scribed by the SNM. The scribed note accurately reflects my " personal services and decisions made by me.    ASHLEY Ramirez CNM

## 2019-09-25 NOTE — PROGRESS NOTES
"Subjective:      38 year old  at 30w0d presents for a routine prenatal appointment.   Denies vaginal bleeding or leakage of fluid.  denies contractions. Endorses fetal movement.      No HA, visual changes, RUQ or epigastric pain.   Patient concerns: Feeling well overall.   - Wondering if family needs to get Tdap and flu shot.     - Reviewed EOB labs with patient. Vit D 73, discussed decreased dosing of Vit D supplement.   - FOB's family with bigger babies (10#)  Reviewed TDAP Previously given     Objective:  Vitals:    19 0759   BP: 119/84   Pulse: 111   Weight: 88 kg (193 lb 14.4 oz)   Height: 1.626 m (5' 4\")   See ob flowsheet    Assessment/Plan:   Encounter Diagnosis   Name Primary?     Supervision of high-risk pregnancy of elderly multigravida Yes     Orders Placed This Encounter   Procedures     US OB > 14 Weeks     - Reviewed total weight gain, encouraged continued healthy diet and exercise.    - Reviewed importance of daily fetal kick count and why/how to contact provider.  - Reviewed why/how to contact provider if headache/visual changes/RUQ or epigastric pain, decreased fetal movement, vaginal bleeding, leakage of fluid or more than 4 contractions in an hour.  - Patient education/orders or handouts today: Flu shot, PTL signs/symptoms  - Flu shot given today  - Return to clinic in 2 weeks for MAY and growth US and prn if questions or concerns.     I, LILY Robbins, am serving as a scribe to document services personally performed by CNM based on the provider's statements to me. - LILY Robbins  The encounter was performed by me and scribed by the SNM. The scribed note accurately reflects my personal services and decisions made by me. -Cristo Abbott, ASHLEY NAVAS  "

## 2019-10-09 ENCOUNTER — OFFICE VISIT (OUTPATIENT)
Dept: OBGYN | Facility: CLINIC | Age: 39
End: 2019-10-09
Attending: OBSTETRICS & GYNECOLOGY
Payer: COMMERCIAL

## 2019-10-09 ENCOUNTER — ANCILLARY PROCEDURE (OUTPATIENT)
Dept: ULTRASOUND IMAGING | Facility: CLINIC | Age: 39
End: 2019-10-09
Attending: ADVANCED PRACTICE MIDWIFE
Payer: COMMERCIAL

## 2019-10-09 VITALS
HEART RATE: 114 BPM | SYSTOLIC BLOOD PRESSURE: 109 MMHG | DIASTOLIC BLOOD PRESSURE: 72 MMHG | HEIGHT: 64 IN | WEIGHT: 192.3 LBS | BODY MASS INDEX: 32.83 KG/M2

## 2019-10-09 DIAGNOSIS — O09.529 SUPERVISION OF HIGH-RISK PREGNANCY OF ELDERLY MULTIGRAVIDA: Primary | ICD-10-CM

## 2019-10-09 DIAGNOSIS — O09.529 SUPERVISION OF HIGH-RISK PREGNANCY OF ELDERLY MULTIGRAVIDA: ICD-10-CM

## 2019-10-09 PROCEDURE — 76805 OB US >/= 14 WKS SNGL FETUS: CPT

## 2019-10-09 PROCEDURE — G0463 HOSPITAL OUTPT CLINIC VISIT: HCPCS | Mod: ZF

## 2019-10-09 ASSESSMENT — MIFFLIN-ST. JEOR: SCORE: 1547.27

## 2019-10-09 NOTE — NURSING NOTE
Chief Complaint   Patient presents with     Prenatal Care     32w0d       See BUCK Ortiz 10/9/2019

## 2019-10-09 NOTE — PROGRESS NOTES
MAY Visit 10/9/19    S: Patient is doing well today. No complaints. Denies vaginal bleeding, contractions, LOF, headaches, vision changes, nausea/vomiting, foul-odor discharge, dysuria. Baby is moving well. No concerns    O:  See OB Flowsheet    FH: 32 cm  FHT: 150s    A/P: 37 yo  @ 32w0d presents for MAY visit  1) Prenatal care: Rh positive, Samanta negative, Rubella immune, Infectious labs wnl,   2) AMA/Genetic screening: Normal NIPT, AFP negative, level II US normal, having a GIRL  3) Low lying placenta: Resolved on recent US  4) Family history of large babies: Had Growth US with EFW 65%ile, further studies as clinically indicated  5) Labor plans: Interest in Epidural/breastfeed/unsure contraception  6) s/p Tdap and Flu vaccines  7) RTC in 2 weeks for MAY visit; collect GBS in 4 weeks    Staffed with Dr. Moises Leon MD  OB/GYN PGY-1  10/09/19 9:07 AM    Staff MD Note    I appreciate the note by Dr. Leon.  Any necessary changes have been made by me.  I evaluated the patient with the resident and agree with the assessment and plan.    Ev De Leon MD

## 2019-10-09 NOTE — LETTER
10/9/2019       RE: Ana Cleveland  [address]  [facility] [address]     Dear Colleague,    Thank you for referring your patient, Ana Cleveland, to the [facility] at [facility]. Please see a copy of my visit note below.    MAY Visit 10/9/19    S: Patient is doing well today. No complaints. Denies vaginal bleeding, contractions, LOF, headaches, vision changes, nausea/vomiting, foul-odor discharge, dysuria. Baby is moving well. No concerns    O:  See OB Flowsheet    FH: 32 cm  FHT: 150s    A/P: 37 yo  @ 32w0d presents for MAY visit  1) Prenatal care: Rh positive, Samanta negative, Rubella immune, Infectious labs wnl,   2) AMA/Genetic screening: Normal NIPT, AFP negative, level II US normal, having a GIRL  3) Low lying placenta: Resolved on recent US  4) Family history of large babies: Had Growth US with EFW 65%ile, further studies as clinically indicated  5) Labor plans: Interest in Epidural/breastfeed/unsure contraception  6) s/p Tdap and Flu vaccines  7) RTC in 2 weeks for MAY visit; collect GBS in 4 weeks    Staffed with Dr. Moises Leon MD  OB/GYN PGY-1  10/09/19 9:07 AM    Staff MD Note    I appreciate the note by Dr. Leon.  Any necessary changes have been made by me.  I evaluated the patient with the resident and agree with the assessment and plan.    Ev De Leon MD

## 2019-10-22 ENCOUNTER — OFFICE VISIT (OUTPATIENT)
Dept: OBGYN | Facility: CLINIC | Age: 39
End: 2019-10-22
Attending: OBSTETRICS & GYNECOLOGY
Payer: COMMERCIAL

## 2019-10-22 VITALS
WEIGHT: 198.5 LBS | SYSTOLIC BLOOD PRESSURE: 112 MMHG | HEART RATE: 106 BPM | DIASTOLIC BLOOD PRESSURE: 76 MMHG | HEIGHT: 64 IN | BODY MASS INDEX: 33.89 KG/M2

## 2019-10-22 DIAGNOSIS — O09.529 SUPERVISION OF HIGH-RISK PREGNANCY OF ELDERLY MULTIGRAVIDA: Primary | ICD-10-CM

## 2019-10-22 ASSESSMENT — MIFFLIN-ST. JEOR: SCORE: 1575.39

## 2019-10-22 NOTE — LETTER
"10/22/2019       RE: Ana Cleveland  [address]  [facility] [address]     Dear Colleague,    Thank you for referring your patient, Ana Cleveland, to the [facility] at [facility]. Please see a copy of my visit note below.    [facility] CLINIC  PRENATAL CARE VISIT      SUBJECTIVE   Ana Cleveland is a 38 year old  at 33w6d by LMP c/w 8w2d US who presents for return OB visit.     Patient reports feeling well overall. She presents with her  Bertin. Baby girl is moving well. Ana denies bleeding, increase in vaginal discharge, loss of fluid. She does note some increased cramping that feels like menstrual cramps but has no defined pattern. She also described a new onset of vaginal pain over the past 2 weeks, which is \"shooting\". It happened about 12x yesterday, very quick onset, then total resolution. Feels fine in between episodes. Denies headache, vision changes, SOB, chest pain. They are very excited about upcoming delivery and are planning to name Baby girl \"Kimi\".     Pregnancy complicated by:   - low lying placenta (now resolved)  - advanced maternal age  - h/o abnormal pap (LSIL in , consistent w/ condyloma acuminatum), satisfactory colposcopy w no biopsies, normal paps since  - chlamydia in , neg STI screens since      OBJECTIVE   /76   Pulse 106   Ht 1.626 m (5' 4\")   Wt 90 kg (198 lb 8 oz)   LMP 2019 (Approximate)   BMI 34.07 kg/m       See Ob Flowsheet    FHT: 152 bpm  Fundal height: 35cm     ASSESSMENT & PLAN   38 year old  at 33w6d by LMP c/w 8w2d US who presents for return OB visit.     1. PNC: routine ob labs reviewed   Rh pos, Rubella immune, infectious labs wnl,    GBS in 2 weeks   S/p Tdap, flu vaccines    2. Genetic screening/ diagnosis:    Normal NIPT, AFP neg, level II US normal, having a girl    3. Inpatient planning:   plans epidural for labor discomfort   Plans " breastfeeding   Discussed pediatrician plans    Contraception plans: condoms    RTC in 2 wk     Staffed with Dr Raj Nunn MD MSc  OB/GYN PGY-1  10/22/19 9:04 AM    [facility] staff:  Appreciate note by Dr. Nunn.  I have seen and examined the patient with the resident. I have reviewed, edited, and agree with the note.        Tyesha Anthony MD, FACOG  10/22/2019  11:57 AM

## 2019-10-22 NOTE — PROGRESS NOTES
"[facility] CLINIC  PRENATAL CARE VISIT      SUBJECTIVE   Ana Cleveland is a 38 year old  at 33w6d by LMP c/w 8w2d US who presents for return OB visit.     Patient reports feeling well overall. She presents with her  Bertin. Baby girl is moving well. Ana denies bleeding, increase in vaginal discharge, loss of fluid. She does note some increased cramping that feels like menstrual cramps but has no defined pattern. She also described a new onset of vaginal pain over the past 2 weeks, which is \"shooting\". It happened about 12x yesterday, very quick onset, then total resolution. Feels fine in between episodes. Denies headache, vision changes, SOB, chest pain. They are very excited about upcoming delivery and are planning to name Baby girl \"Kimi\".     Pregnancy complicated by:   - low lying placenta (now resolved)  - advanced maternal age  - h/o abnormal pap (LSIL in , consistent w/ condyloma acuminatum), satisfactory colposcopy w no biopsies, normal paps since  - chlamydia in , neg STI screens since      OBJECTIVE   /76   Pulse 106   Ht 1.626 m (5' 4\")   Wt 90 kg (198 lb 8 oz)   LMP 2019 (Approximate)   BMI 34.07 kg/m      See Ob Flowsheet    FHT: 152 bpm  Fundal height: 35cm     ASSESSMENT & PLAN   38 year old  at 33w6d by LMP c/w 8w2d US who presents for return OB visit.     1. PNC: routine ob labs reviewed   Rh pos, Rubella immune, infectious labs wnl,    GBS in 2 weeks   S/p Tdap, flu vaccines    2. Genetic screening/ diagnosis:    Normal NIPT, AFP neg, level II US normal, having a girl    3. Inpatient planning:   plans epidural for labor discomfort   Plans breastfeeding   Discussed pediatrician plans    Contraception plans: condoms    RTC in 2 wk     Staffed with Dr Raj Nunn MD MSc  OB/GYN PGY-1  10/22/19 9:04 AM    [facility] staff:  Appreciate note by Dr. Nunn.  I have seen and examined the patient with the resident. I " have reviewed, edited, and agree with the note.        Tyesha Anthony MD, FACOG  10/22/2019  11:57 AM

## 2019-11-05 ENCOUNTER — HEALTH MAINTENANCE LETTER (OUTPATIENT)
Age: 39
End: 2019-11-05

## 2019-11-05 ENCOUNTER — OFFICE VISIT (OUTPATIENT)
Dept: OBGYN | Facility: CLINIC | Age: 39
End: 2019-11-05
Attending: OBSTETRICS & GYNECOLOGY
Payer: COMMERCIAL

## 2019-11-05 VITALS
DIASTOLIC BLOOD PRESSURE: 82 MMHG | WEIGHT: 200.4 LBS | HEART RATE: 128 BPM | HEIGHT: 64 IN | BODY MASS INDEX: 34.21 KG/M2 | SYSTOLIC BLOOD PRESSURE: 127 MMHG

## 2019-11-05 DIAGNOSIS — Z34.03 ENCOUNTER FOR SUPERVISION OF NORMAL FIRST PREGNANCY IN THIRD TRIMESTER: Primary | ICD-10-CM

## 2019-11-05 PROCEDURE — G0463 HOSPITAL OUTPT CLINIC VISIT: HCPCS | Mod: ZF

## 2019-11-05 PROCEDURE — 87186 SC STD MICRODIL/AGAR DIL: CPT | Performed by: OBSTETRICS & GYNECOLOGY

## 2019-11-05 PROCEDURE — 87653 STREP B DNA AMP PROBE: CPT | Performed by: OBSTETRICS & GYNECOLOGY

## 2019-11-05 ASSESSMENT — ANXIETY QUESTIONNAIRES
7. FEELING AFRAID AS IF SOMETHING AWFUL MIGHT HAPPEN: NOT AT ALL
5. BEING SO RESTLESS THAT IT IS HARD TO SIT STILL: NOT AT ALL
3. WORRYING TOO MUCH ABOUT DIFFERENT THINGS: NOT AT ALL
IF YOU CHECKED OFF ANY PROBLEMS ON THIS QUESTIONNAIRE, HOW DIFFICULT HAVE THESE PROBLEMS MADE IT FOR YOU TO DO YOUR WORK, TAKE CARE OF THINGS AT HOME, OR GET ALONG WITH OTHER PEOPLE: NOT DIFFICULT AT ALL
1. FEELING NERVOUS, ANXIOUS, OR ON EDGE: NOT AT ALL
GAD7 TOTAL SCORE: 0
6. BECOMING EASILY ANNOYED OR IRRITABLE: NOT AT ALL
2. NOT BEING ABLE TO STOP OR CONTROL WORRYING: NOT AT ALL

## 2019-11-05 ASSESSMENT — MIFFLIN-ST. JEOR: SCORE: 1579.01

## 2019-11-05 ASSESSMENT — PATIENT HEALTH QUESTIONNAIRE - PHQ9
5. POOR APPETITE OR OVEREATING: NOT AT ALL
SUM OF ALL RESPONSES TO PHQ QUESTIONS 1-9: 1

## 2019-11-05 NOTE — PROGRESS NOTES
"[facility] CLINIC  PRENATAL CARE VISIT      SUBJECTIVE   Patient reports feeling good overall.   She is noting a few [place]agu[place]-Montalvo contractions, typically remit with position change  Feels good movement  No headache, vaginal bleeding or discharge.     Pregnancy complicated by:   - Advanced maternal age  -Low lying placenta (now resolved)    OBJECTIVE   /82 (BP Location: Right arm, Patient Position: Chair)   Pulse 128   Ht 1.626 m (5' 4\")   Wt 90.9 kg (200 lb 6.4 oz)   LMP 2019 (Approximate)   Breastfeeding? No   BMI 34.40 kg/m    SVE closed    ASSESSMENT & PLAN   39 year old  at 35w6d by LMP c/q 8w2d US, MAY.    1. PNC: routine ob labs reviewed   Rh pos, Rubella immune   GBS today  2. Genetic screening/ diagnosis: Normal NIPT, AFP neg, normal level 2 US, having girl  3. Inpatient planning:   plans epidural for labor discomfort   Plans breastfeeding   Discussed pediatrician plans   Contraception plans: condoms    RTC in 1 wk    I acted as a scribe for Dr. Clayton Aleman, MS3  2019 9:51 AM    I, Dr. Arnold, personally performed the services described in this documentation, as scribed by Nikita Aleman in my presence, and it is both accurate and complete.   Rosita Arnold MD           "

## 2019-11-05 NOTE — NURSING NOTE
Chief Complaint   Patient presents with     Prenatal Care     35w6d       See BUCK Ortiz 11/5/2019

## 2019-11-05 NOTE — LETTER
[facility] PROFESSIONAL BLDG [facility] [address]  [address]RD FLR,[address]  [address]  [facility] [id number] [phone]          November 5, 2019    RE:  Ana Cleveland                                                                                                                                                       [address] [facility] [address]            To whom it may concern:    Ana Cleveland is under my professional care for her pregnancy. Her ADDIE is 12/4/2019.     Sincerely,        Rosita Arnold MD

## 2019-11-05 NOTE — LETTER
"2019       RE: Ana Cleveland  [address]  [facility] [address]     Dear Colleague,    Thank you for referring your patient, Ana Cleveland, to the [facility] at [facility]. Please see a copy of my visit note below.    S CLINIC  PRENATAL CARE VISIT      SUBJECTIVE   Patient reports feeling good overall.   She is noting a few [place]-Montalvo contractions, typically remit with position change  Feels good movement  No headache, vaginal bleeding or discharge.     Pregnancy complicated by:   - Advanced maternal age  -Low lying placenta (now resolved)    OBJECTIVE   /82 (BP Location: Right arm, Patient Position: Chair)   Pulse 128   Ht 1.626 m (5' 4\")   Wt 90.9 kg (200 lb 6.4 oz)   LMP 2019 (Approximate)   Breastfeeding? No   BMI 34.40 kg/m     SVE closed    ASSESSMENT & PLAN   39 year old  at 35w6d by LMP c/q 8w2d US, MAY.    1. PNC: routine ob labs reviewed   Rh pos, Rubella immune   GBS today  2. Genetic screening/ diagnosis: Normal NIPT, AFP neg, normal level 2 US, having girl  3. Inpatient planning:   plans epidural for labor discomfort   Plans breastfeeding   Discussed pediatrician plans   Contraception plans: condoms    RTC in 1 wk    I acted as a scribe for Dr. Clayton Aleman, MS3  2019 9:51 AM    I, Dr. Arnold, personally performed the services described in this documentation, as scribed by Nikita Aleman in my presence, and it is both accurate and complete.     Rosita Arnold MD               "

## 2019-11-06 LAB
GP B STREP DNA SPEC QL NAA+PROBE: POSITIVE
SPECIMEN SOURCE: ABNORMAL

## 2019-11-06 ASSESSMENT — ANXIETY QUESTIONNAIRES: GAD7 TOTAL SCORE: 0

## 2019-11-09 LAB
BACTERIA SPEC CULT: ABNORMAL
SPECIMEN SOURCE: ABNORMAL

## 2019-11-12 ENCOUNTER — OFFICE VISIT (OUTPATIENT)
Dept: OBGYN | Facility: CLINIC | Age: 39
End: 2019-11-12
Attending: OBSTETRICS & GYNECOLOGY
Payer: COMMERCIAL

## 2019-11-12 VITALS
DIASTOLIC BLOOD PRESSURE: 78 MMHG | SYSTOLIC BLOOD PRESSURE: 114 MMHG | HEART RATE: 97 BPM | BODY MASS INDEX: 34.56 KG/M2 | WEIGHT: 202.4 LBS | HEIGHT: 64 IN

## 2019-11-12 DIAGNOSIS — Z34.03 ENCOUNTER FOR SUPERVISION OF NORMAL FIRST PREGNANCY IN THIRD TRIMESTER: Primary | ICD-10-CM

## 2019-11-12 PROCEDURE — G0463 HOSPITAL OUTPT CLINIC VISIT: HCPCS | Mod: ZF

## 2019-11-12 ASSESSMENT — MIFFLIN-ST. JEOR: SCORE: 1588.08

## 2019-11-12 NOTE — NURSING NOTE
Chief Complaint   Patient presents with     Prenatal Care     36w6d       See BUCK Ortiz 11/12/2019

## 2019-11-12 NOTE — PROGRESS NOTES
"[facility] CLINIC  PRENATAL CARE VISIT      SUBJECTIVE   Ana Cleveland is a 39 year old  at 36w6d by LMP c/w 8w2d US, return OB. Presents w  Bertin.     She is sleeping ok, had some trouble falling asleep last night but attributes it to stress at work. Baby girl \"Kimi\" has been moving well, very active. Ana had an episode this weekend while shopping at [employer] and being on her feet for a while of a few painful contractions, which then resolved. No leakage of fluid, vaginal bleeding. She also denies HA, blurry vision, chest pain, SOB, RUQ pain significant LE swelling. No significant acid reflux.     She has several questions about her GBS+ status and family hx of macrosomia, large head circumference    Pregnancy complicated by:   - low lying placenta (now resolved)  - advanced maternal age    OBJECTIVE   Ht 1.626 m (5' 4\")   Wt 91.8 kg (202 lb 6.4 oz)   LMP 2019 (Approximate)   Breastfeeding? No   BMI 34.74 kg/m      See Ob Flowsheet  FH: 38cm  FHT: 144 bpm  On palpation baby is cephalic    ASSESSMENT & PLAN   39 year old  at 36w6d by LMP c/w 8w2d US, MAY.    1. PNC: routine ob labs reviewed   Rh pos, Rubella immune   GBS pos, no PCN allergy     2. Genetic screening/ diagnosis: Normal NIPT, AFP neg, normal level 2 US, having girl    3. Inpatient planning:              Plans epidural for labor discomfort              Plans breastfeeding              Discussed pediatrician plans              Contraception plans: condoms   Discussed labor precautions and when to present to triage    RTC 1 week    Staffed with Dr. Clayton Nunn MD MSc  OB/GYN PGY-1  19 8:46 AM      Appreciate note by Dr. Nunn. Patient has been seen and examined by me with the resident, agree with above note.     Rosita Arnold MD            "

## 2019-11-12 NOTE — LETTER
"2019       RE: Ana Cleveland  [address]  [facility] [address]     Dear Colleague,    Thank you for referring your patient, Ana Cleveland, to the [facility] at [facility]. Please see a copy of my visit note below.    [facility] CLINIC  PRENATAL CARE VISIT      SUBJECTIVE   Ana Cleveland is a 39 year old  at 36w6d by LMP c/w 8w2d US, return OB. Presents w  Bertin.     She is sleeping ok, had some trouble falling asleep last night but attributes it to stress at work. Baby girl \"Kimi\" has been moving well, very active. Ana had an episode this weekend while shopping at [employer] and being on her feet for a while of a few painful contractions, which then resolved. No leakage of fluid, vaginal bleeding. She also denies HA, blurry vision, chest pain, SOB, RUQ pain significant LE swelling. No significant acid reflux.     She has several questions about her GBS+ status and family hx of macrosomia, large head circumference    Pregnancy complicated by:   - low lying placenta (now resolved)  - advanced maternal age    OBJECTIVE   Ht 1.626 m (5' 4\")   Wt 91.8 kg (202 lb 6.4 oz)   LMP 2019 (Approximate)   Breastfeeding? No   BMI 34.74 kg/m       See Ob Flowsheet  FH: 38cm  FHT: 144 bpm  On palpation baby is cephalic    ASSESSMENT & PLAN   39 year old  at 36w6d by LMP c/w 8w2d US, MAY.    1. PNC: routine ob labs reviewed   Rh pos, Rubella immune   GBS pos, no PCN allergy     2. Genetic screening/ diagnosis: Normal NIPT, AFP neg, normal level 2 US, having girl    3. Inpatient planning:               Plans epidural for labor discomfort              Plans breastfeeding              Discussed pediatrician plans              Contraception plans: condoms   Discussed labor precautions and when to present to triage    RTC 1 week    Staffed with Dr. Clayton Nunn MD MSc  OB/GYN PGY-1  19 8:46 AM      Appreciate " note by Dr. Nunn. Patient has been seen and examined by me with the resident, agree with above note.     Rosita Arnold MD

## 2019-11-20 ENCOUNTER — OFFICE VISIT (OUTPATIENT)
Dept: OBGYN | Facility: CLINIC | Age: 39
End: 2019-11-20
Attending: OBSTETRICS & GYNECOLOGY
Payer: COMMERCIAL

## 2019-11-20 VITALS
HEIGHT: 64 IN | DIASTOLIC BLOOD PRESSURE: 67 MMHG | BODY MASS INDEX: 34.18 KG/M2 | SYSTOLIC BLOOD PRESSURE: 96 MMHG | WEIGHT: 200.2 LBS | HEART RATE: 109 BPM

## 2019-11-20 DIAGNOSIS — O09.529 SUPERVISION OF HIGH-RISK PREGNANCY OF ELDERLY MULTIGRAVIDA: Primary | ICD-10-CM

## 2019-11-20 PROCEDURE — G0463 HOSPITAL OUTPT CLINIC VISIT: HCPCS | Mod: ZF

## 2019-11-20 ASSESSMENT — MIFFLIN-ST. JEOR: SCORE: 1578.1

## 2019-11-20 NOTE — NURSING NOTE
Chief Complaint   Patient presents with     Prenatal Care     38w0d       See BUCK Ortiz 11/20/2019

## 2019-11-20 NOTE — PROGRESS NOTES
"MAY Visit 19    S: She is doing well. Notes feeling [place]-Montalvo contractions that are irregular, mildly painful, worse at night, and typically resolve with changes in position. Denies any vaginal bleeding or leakage of fluid. + fetal movements. Denies any headache, changes in vision, epigastric pain, n/v, or RUQ pain.    O: BP 96/67 (BP Location: Right arm, Patient Position: Chair)   Pulse 109   Ht 1.626 m (5' 4\")   Wt 90.8 kg (200 lb 3.2 oz)   LMP 2019 (Approximate)   Breastfeeding No   BMI 34.36 kg/m    See OB Flowsheet    A/P: 39 yo  @ 38w0d presents for MAY visit  1) Prenatal care: Rh positive, Samanta negative, Rubella immune, Infectious labs wnl,   2) AMA/Genetic screening: Normal NIPT, AFP negative, level II US normal, having a GIRL  3) Low lying placenta: Resolved  4) Family history of large babies: Had Growth US with EFW 65%ile at 32 weeks, further studies as clinically indicated  5) Labor plans: Interest in Epidural/breastfeed/condoms  6) s/p Tdap and flu vaccines  7) GBS positive, PCN in labor  8) RTC in 1 week for MAY visit, labor precautions discussed    Ev De Leon MD  "

## 2019-11-20 NOTE — LETTER
"  RE: Ana Celveland  [address]  [facility] [address]     Dear Colleague,    Thank you for referring your patient, Ana Cleveland, to the [facility] at [facility]. Please see a copy of my visit note below.    MAY Visit 19    S: She is doing well. Notes feeling [place]-Montalvo contractions that are irregular, mildly painful, worse at night, and typically resolve with changes in position. Denies any vaginal bleeding or leakage of fluid. + fetal movements. Denies any headache, changes in vision, epigastric pain, n/v, or RUQ pain.    O: BP 96/67 (BP Location: Right arm, Patient Position: Chair)   Pulse 109   Ht 1.626 m (5' 4\")   Wt 90.8 kg (200 lb 3.2 oz)   LMP 2019 (Approximate)   Breastfeeding No   BMI 34.36 kg/m     See OB Flowsheet    A/P: 37 yo  @ 38w0d presents for MAY visit  1) Prenatal care: Rh positive, Samanta negative, Rubella immune, Infectious labs wnl,   2) AMA/Genetic screening: Normal NIPT, AFP negative, level II US normal, having a GIRL  3) Low lying placenta: Resolved  4) Family history of large babies: Had Growth US with EFW 65%ile at 32 weeks, further studies as clinically indicated  5) Labor plans: Interest in Epidural/breastfeed/condoms  6) s/p Tdap and flu vaccines  7) GBS positive, PCN in labor  8) RTC in 1 week for MAY visit, labor precautions discussed    Ev De Leon MD    "

## 2019-11-26 ENCOUNTER — OFFICE VISIT (OUTPATIENT)
Dept: OBGYN | Facility: CLINIC | Age: 39
End: 2019-11-26
Attending: OBSTETRICS & GYNECOLOGY
Payer: COMMERCIAL

## 2019-11-26 VITALS
WEIGHT: 203.4 LBS | DIASTOLIC BLOOD PRESSURE: 76 MMHG | HEIGHT: 64 IN | BODY MASS INDEX: 34.72 KG/M2 | SYSTOLIC BLOOD PRESSURE: 117 MMHG | HEART RATE: 98 BPM

## 2019-11-26 DIAGNOSIS — Z34.03 ENCOUNTER FOR SUPERVISION OF NORMAL FIRST PREGNANCY IN THIRD TRIMESTER: Primary | ICD-10-CM

## 2019-11-26 PROCEDURE — G0463 HOSPITAL OUTPT CLINIC VISIT: HCPCS | Mod: ZF

## 2019-11-26 ASSESSMENT — MIFFLIN-ST. JEOR: SCORE: 1592.62

## 2019-11-26 NOTE — LETTER
"2019       RE: Ana Cleveland  [address]  [facility] [address]     Dear Colleague,    Thank you for referring your patient, Ana Cleveland, to the [facility] at [facility]. Please see a copy of my visit note below.    S: Ms. Cleveland is feeling well today. She denies bleeding or leakage but does note some [place]-Montalvo contractions that are irregular, usually at night. She had them a \"few times\" last night. Patient endorses a lot of fetal movement, at all times of day and night. Denies headaches, changes in vision. Only concern is family history of macrosomia; she is nervous about delivery.    O: /76 (BP Location: Right arm, Patient Position: Chair)   Pulse 98   Ht 1.626 m (5' 4\")   Wt 92.3 kg (203 lb 6.4 oz)   LMP 2019 (Approximate)   Breastfeeding No   BMI 34.91 kg/m        FHR: 150s     A/P: 40 yo  @38 wks 6 days based on LMP and US at 8 wks 2 days here for MAY.    1.Prenatal Care:  Rh pos, Rubella immune                              GBS pos, PCN in labor                              S/p Tdap and flu vaccine    2. Genetic screening/ diagnosis: Normal NIPT, AFP neg, normal level 2 US, having girl    3. Family history of large babies: Had Growth US with EFW 65%ile at 32 weeks, further studies as clinically indicated     4. Inpatient planning:              Plans epidural for labor discomfort              Plans breastfeeding              Discussed labor precautions and when to present to triage   Discussed safety walking outside, especially with upcoming snowstorm    Reviewed plan for IOL at 41wks if undelivered. Will consider membrane stripping next visit.   Susanne Sanchez, MS3    Physician Attestation   I, Rosita Arnold, was present with the medical student who participated in the service and in the documentation of the note.  I have verified the history and personally performed the physical exam " and medical decision making.  I agree with the assessment and plan of care as documented in the note.      Items personally reviewed: vitals.    Rosita Arnold MD

## 2019-11-26 NOTE — PROGRESS NOTES
"S: Ms. Cleveland is feeling well today. She denies bleeding or leakage but does note some Mason-Montalvo contractions that are irregular, usually at night. She had them a \"few times\" last night. Patient endorses a lot of fetal movement, at all times of day and night. Denies headaches, changes in vision. Only concern is family history of macrosomia; she is nervous about delivery.    O: /76 (BP Location: Right arm, Patient Position: Chair)   Pulse 98   Ht 1.626 m (5' 4\")   Wt 92.3 kg (203 lb 6.4 oz)   LMP 2019 (Approximate)   Breastfeeding No   BMI 34.91 kg/m       FHR: 150s     A/P: 40 yo  @38 wks 6 days based on LMP and US at 8 wks 2 days here for MAY.    1.Prenatal Care:  Rh pos, Rubella immune                              GBS pos, PCN in labor                              S/p Tdap and flu vaccine    2. Genetic screening/ diagnosis: Normal NIPT, AFP neg, normal level 2 US, having girl    3. Family history of large babies: Had Growth US with EFW 65%ile at 32 weeks, further studies as clinically indicated     4. Inpatient planning:              Plans epidural for labor discomfort              Plans breastfeeding              Discussed labor precautions and when to present to triage   Discussed safety walking outside, especially with upcoming snowstorm    Reviewed plan for IOL at 41wks if undelivered. Will consider membrane stripping next visit.   Susanne Sanchez, MS3    Physician Attestation   I, Rosita Arnold, was present with the medical student who participated in the service and in the documentation of the note.  I have verified the history and personally performed the physical exam and medical decision making.  I agree with the assessment and plan of care as documented in the note.      Items personally reviewed: vitals.    Rosita Arnold MD  "

## 2019-11-26 NOTE — NURSING NOTE
Chief Complaint   Patient presents with     Prenatal Care     38w6d       See BUCK Ortiz 11/26/2019

## 2019-12-03 ENCOUNTER — OFFICE VISIT (OUTPATIENT)
Dept: OBGYN | Facility: CLINIC | Age: 39
End: 2019-12-03
Attending: OBSTETRICS & GYNECOLOGY
Payer: COMMERCIAL

## 2019-12-03 VITALS
DIASTOLIC BLOOD PRESSURE: 79 MMHG | SYSTOLIC BLOOD PRESSURE: 127 MMHG | BODY MASS INDEX: 34.57 KG/M2 | WEIGHT: 202.5 LBS | HEIGHT: 64 IN | HEART RATE: 112 BPM

## 2019-12-03 DIAGNOSIS — Z34.03 ENCOUNTER FOR SUPERVISION OF NORMAL FIRST PREGNANCY IN THIRD TRIMESTER: Primary | ICD-10-CM

## 2019-12-03 ASSESSMENT — MIFFLIN-ST. JEOR: SCORE: 1588.53

## 2019-12-03 NOTE — PROGRESS NOTES
"Subjective:  Ana Suarez is a 39 y.o. female, , presenting at [id number] for her OB visit. She has no major complaints today and is prepared for delivery any day now. Reports the baby is moving and has not had [place]en-Montalvo contractions. Mentions mild intolerance to cold. She denies unusual cramping, bleeding, chest pain, or changes in bowel movements or urination.    Objective:  /79   Pulse 112   Ht 1.626 m (5' 4\")   Wt 91.9 kg (202 lb 8 oz)   LMP 2019 (Approximate)   BMI 34.76 kg/m       ROS: 10 point ROS neg other than the symptoms noted above in the HPI.    Physical exam: see flow sheet    Assessment/Plan:  Ana is a 39 y.o. female, , at [id number] who is progressing as expected. BP and GCT were WNL.  Suspected large fetus.  S/p Stripping of membranes  Labor precautions given.  Watch labor curve given fetal size and maternal stature, discussed possible c/s with pt.    Michelle Haywood MD        "

## 2019-12-03 NOTE — LETTER
"12/3/2019       RE: Ana Cleveland  [address]  [facility] [address]     Dear Colleague,    Thank you for referring your patient, Ana Cleveland, to the [facility] at [facility]. Please see a copy of my visit note below.    Subjective:  Ana Suarez is a 39 y.o. female, , presenting at [id number] for her OB visit. She has no major complaints today and is prepared for delivery any day now. Reports the baby is moving and has not had [place]-Montalvo contractions. Mentions mild intolerance to cold. She denies unusual cramping, bleeding, chest pain, or changes in bowel movements or urination.    Objective:  /79   Pulse 112   Ht 1.626 m (5' 4\")   Wt 91.9 kg (202 lb 8 oz)   LMP 2019 (Approximate)   BMI 34.76 kg/m        ROS: 10 point ROS neg other than the symptoms noted above in the HPI.    Physical exam: see flow sheet    Assessment/Plan:  Ana is a 39 y.o. female, , at [id number] who is progressing as expected. BP and GCT were WNL.  Suspected large fetus.  S/p Stripping of membranes  Labor precautions given.  Watch labor curve given fetal size and maternal stature, discussed possible c/s with pt.    Michelle Haywood MD      "

## 2019-12-08 NOTE — PROGRESS NOTES
[facility]  OB Visit    S: Ms. Cleveland is doing well today but is feeling ready to be done with pregnancy. She denies any cramping or contractions. She feels nearly constant fetal movement. She had some mucous discharge after having her membranes stripped last week but no bleeding or significant discharge since. She denies headache, changes in vision, chest pain, shortness of breath, nausea, and vomiting.     Pregnancy notable for:  - AMA  - Low lying placenta, resolved    O:  /72   Pulse 99   Wt 92.5 kg (204 lb)   LMP 2019 (Approximate)   Breastfeeding No   BMI 35.02 kg/m       Gen: alert, oriented, no acute distress  Resp: breathing comfortably on room air  Abdomen: gravid, nontender, EFW 9 lbs and Cephalic by Leopold's  Extr: trace edema  Cvx: deferred per patient preference  Fundal height: 41cm  FHT: 130 bpm    A/P: Ms. Cleveland is a 39-year-old  at 40w6d by LMP c/w 8w2d US here for a return OB visit.    #Prenatal care  - Rh positive, antibody screen negative, rubella immune. Infectious labs wnl,   - GBS positive, will need pencillin in labor  - Genetic screening: Normal NPIT, AFP neg, level 2 US wnl  - PAP wnl, HPV neg 3/31/16  - Feeding: breast  - Contraception: condoms  - Immunizations: s/p TDaP, flu vaccines  - Birth plans: epidural    #Low lying placenta, resolved 19    #Family history of large babies, EFW 65%ile at 32 weeks  - EFW 9 lbs by leopolds at 39w6d and on exam today  - Discussed with patient we will monitor her labor progress closely. Also briefly discussed the risks of shoulder dystocia including the possible need for additional maneuvers, the risks of temporary nerve damage and broken bones, and the longterm risks of nerve damage and hypoxia. Emphasized the importance of following instructions while pushing, as there may be times when she is told to stop pushing. Also discussed the labor and delivery team may discuss this in more detail when she comes for  induction.     IOL scheduled for 12/12/19 (patient will be 41w1d, as there were no induction spots open on L&D for 12/11).     Discussed with Dr. Raj Milligan MD  OB/GYN, PGY4    The Patient was seen in [facility]ident Continuity [facility] by ADRIANA MILLIGAN.  I reviewed the history & exam. Assessment and plan were jointly made.    Tyesha Anthony MD

## 2019-12-10 ENCOUNTER — OFFICE VISIT (OUTPATIENT)
Dept: OBGYN | Facility: CLINIC | Age: 39
End: 2019-12-10
Payer: COMMERCIAL

## 2019-12-10 VITALS
BODY MASS INDEX: 35.02 KG/M2 | WEIGHT: 204 LBS | SYSTOLIC BLOOD PRESSURE: 105 MMHG | HEART RATE: 99 BPM | DIASTOLIC BLOOD PRESSURE: 72 MMHG

## 2019-12-10 DIAGNOSIS — Z3A.40 40 WEEKS GESTATION OF PREGNANCY: Primary | ICD-10-CM

## 2019-12-10 PROCEDURE — G0463 HOSPITAL OUTPT CLINIC VISIT: HCPCS | Mod: ZF

## 2019-12-10 ASSESSMENT — PAIN SCALES - GENERAL: PAINLEVEL: NO PAIN (0)

## 2019-12-10 NOTE — PATIENT INSTRUCTIONS
Please come for you induction of labor on 12/12/19 at 0800. Call labor and delivery at 0700 on 12/12. Thank you!

## 2019-12-10 NOTE — NURSING NOTE
Chief Complaint   Patient presents with     Prenatal Care     MAY 40 weeks and 6 days   Blaire Sylvester LPN

## 2019-12-12 ENCOUNTER — HOSPITAL ENCOUNTER (INPATIENT)
Facility: CLINIC | Age: 39
LOS: 4 days | Discharge: HOME HEALTH | End: 2019-12-16
Attending: OBSTETRICS & GYNECOLOGY | Admitting: OBSTETRICS & GYNECOLOGY
Payer: COMMERCIAL

## 2019-12-12 ENCOUNTER — ANESTHESIA EVENT (OUTPATIENT)
Dept: OBGYN | Facility: CLINIC | Age: 39
End: 2019-12-12
Payer: COMMERCIAL

## 2019-12-12 ENCOUNTER — ANESTHESIA (OUTPATIENT)
Dept: OBGYN | Facility: CLINIC | Age: 39
End: 2019-12-12
Payer: COMMERCIAL

## 2019-12-12 DIAGNOSIS — Z98.891 S/P CESAREAN SECTION: Primary | ICD-10-CM

## 2019-12-12 PROBLEM — Z34.90 ENCOUNTER FOR INDUCTION OF LABOR: Status: ACTIVE | Noted: 2019-12-12

## 2019-12-12 LAB
HGB BLD-MCNC: 11.2 G/DL (ref 11.7–15.7)
PLATELET # BLD AUTO: 233 10E9/L (ref 150–450)
T PALLIDUM AB SER QL: NONREACTIVE

## 2019-12-12 PROCEDURE — 25000132 ZZH RX MED GY IP 250 OP 250 PS 637: Performed by: STUDENT IN AN ORGANIZED HEALTH CARE EDUCATION/TRAINING PROGRAM

## 2019-12-12 PROCEDURE — 25800030 ZZH RX IP 258 OP 636: Performed by: STUDENT IN AN ORGANIZED HEALTH CARE EDUCATION/TRAINING PROGRAM

## 2019-12-12 PROCEDURE — 86850 RBC ANTIBODY SCREEN: CPT | Performed by: STUDENT IN AN ORGANIZED HEALTH CARE EDUCATION/TRAINING PROGRAM

## 2019-12-12 PROCEDURE — 86901 BLOOD TYPING SEROLOGIC RH(D): CPT | Performed by: STUDENT IN AN ORGANIZED HEALTH CARE EDUCATION/TRAINING PROGRAM

## 2019-12-12 PROCEDURE — 25000128 H RX IP 250 OP 636: Performed by: STUDENT IN AN ORGANIZED HEALTH CARE EDUCATION/TRAINING PROGRAM

## 2019-12-12 PROCEDURE — 86923 COMPATIBILITY TEST ELECTRIC: CPT | Performed by: STUDENT IN AN ORGANIZED HEALTH CARE EDUCATION/TRAINING PROGRAM

## 2019-12-12 PROCEDURE — 00HU33Z INSERTION OF INFUSION DEVICE INTO SPINAL CANAL, PERCUTANEOUS APPROACH: ICD-10-PCS | Performed by: ANESTHESIOLOGY

## 2019-12-12 PROCEDURE — 25000125 ZZHC RX 250: Performed by: STUDENT IN AN ORGANIZED HEALTH CARE EDUCATION/TRAINING PROGRAM

## 2019-12-12 PROCEDURE — 86900 BLOOD TYPING SEROLOGIC ABO: CPT | Performed by: STUDENT IN AN ORGANIZED HEALTH CARE EDUCATION/TRAINING PROGRAM

## 2019-12-12 PROCEDURE — 85049 AUTOMATED PLATELET COUNT: CPT | Performed by: STUDENT IN AN ORGANIZED HEALTH CARE EDUCATION/TRAINING PROGRAM

## 2019-12-12 PROCEDURE — 86780 TREPONEMA PALLIDUM: CPT | Performed by: STUDENT IN AN ORGANIZED HEALTH CARE EDUCATION/TRAINING PROGRAM

## 2019-12-12 PROCEDURE — 12000001 ZZH R&B MED SURG/OB UMMC

## 2019-12-12 PROCEDURE — 3E0R3BZ INTRODUCTION OF ANESTHETIC AGENT INTO SPINAL CANAL, PERCUTANEOUS APPROACH: ICD-10-PCS | Performed by: ANESTHESIOLOGY

## 2019-12-12 PROCEDURE — 10907ZC DRAINAGE OF AMNIOTIC FLUID, THERAPEUTIC FROM PRODUCTS OF CONCEPTION, VIA NATURAL OR ARTIFICIAL OPENING: ICD-10-PCS | Performed by: OBSTETRICS & GYNECOLOGY

## 2019-12-12 PROCEDURE — 3E033VJ INTRODUCTION OF OTHER HORMONE INTO PERIPHERAL VEIN, PERCUTANEOUS APPROACH: ICD-10-PCS | Performed by: OBSTETRICS & GYNECOLOGY

## 2019-12-12 PROCEDURE — 3E0P7VZ INTRODUCTION OF HORMONE INTO FEMALE REPRODUCTIVE, VIA NATURAL OR ARTIFICIAL OPENING: ICD-10-PCS | Performed by: OBSTETRICS & GYNECOLOGY

## 2019-12-12 PROCEDURE — 85018 HEMOGLOBIN: CPT | Performed by: STUDENT IN AN ORGANIZED HEALTH CARE EDUCATION/TRAINING PROGRAM

## 2019-12-12 RX ORDER — ONDANSETRON 2 MG/ML
4 INJECTION INTRAMUSCULAR; INTRAVENOUS EVERY 6 HOURS PRN
Status: DISCONTINUED | OUTPATIENT
Start: 2019-12-12 | End: 2019-12-13

## 2019-12-12 RX ORDER — NALBUPHINE HYDROCHLORIDE 10 MG/ML
2.5-5 INJECTION, SOLUTION INTRAMUSCULAR; INTRAVENOUS; SUBCUTANEOUS EVERY 6 HOURS PRN
Status: DISCONTINUED | OUTPATIENT
Start: 2019-12-12 | End: 2019-12-13

## 2019-12-12 RX ORDER — NALOXONE HYDROCHLORIDE 0.4 MG/ML
.1-.4 INJECTION, SOLUTION INTRAMUSCULAR; INTRAVENOUS; SUBCUTANEOUS
Status: DISCONTINUED | OUTPATIENT
Start: 2019-12-12 | End: 2019-12-13

## 2019-12-12 RX ORDER — FENTANYL/BUPIVACAINE/NS/PF 2-1250MCG
PLASTIC BAG, INJECTION (ML) INJECTION CONTINUOUS PRN
Status: DISCONTINUED | OUTPATIENT
Start: 2019-12-12 | End: 2019-12-12 | Stop reason: HOSPADM

## 2019-12-12 RX ORDER — EPHEDRINE SULFATE 50 MG/ML
INJECTION, SOLUTION INTRAMUSCULAR; INTRAVENOUS; SUBCUTANEOUS
Status: DISCONTINUED
Start: 2019-12-12 | End: 2019-12-13 | Stop reason: HOSPADM

## 2019-12-12 RX ORDER — TERBUTALINE SULFATE 1 MG/ML
0.25 INJECTION, SOLUTION SUBCUTANEOUS
Status: DISCONTINUED | OUTPATIENT
Start: 2019-12-12 | End: 2019-12-13

## 2019-12-12 RX ORDER — FENTANYL/BUPIVACAINE/NS/PF 2-1250MCG
PLASTIC BAG, INJECTION (ML) INJECTION
Status: COMPLETED
Start: 2019-12-12 | End: 2019-12-12

## 2019-12-12 RX ORDER — OXYTOCIN 10 [USP'U]/ML
10 INJECTION, SOLUTION INTRAMUSCULAR; INTRAVENOUS
Status: DISCONTINUED | OUTPATIENT
Start: 2019-12-12 | End: 2019-12-13

## 2019-12-12 RX ORDER — MISOPROSTOL 100 UG/1
25 TABLET ORAL EVERY 4 HOURS PRN
Status: DISCONTINUED | OUTPATIENT
Start: 2019-12-12 | End: 2019-12-13

## 2019-12-12 RX ORDER — OXYCODONE AND ACETAMINOPHEN 5; 325 MG/1; MG/1
1 TABLET ORAL
Status: DISCONTINUED | OUTPATIENT
Start: 2019-12-12 | End: 2019-12-13

## 2019-12-12 RX ORDER — ACETAMINOPHEN 325 MG/1
650 TABLET ORAL EVERY 4 HOURS PRN
Status: DISCONTINUED | OUTPATIENT
Start: 2019-12-12 | End: 2019-12-13

## 2019-12-12 RX ORDER — SODIUM CHLORIDE, SODIUM LACTATE, POTASSIUM CHLORIDE, CALCIUM CHLORIDE 600; 310; 30; 20 MG/100ML; MG/100ML; MG/100ML; MG/100ML
INJECTION, SOLUTION INTRAVENOUS CONTINUOUS
Status: DISCONTINUED | OUTPATIENT
Start: 2019-12-12 | End: 2019-12-13

## 2019-12-12 RX ORDER — PENICILLIN G POTASSIUM 5000000 [IU]/1
5 INJECTION, POWDER, FOR SOLUTION INTRAMUSCULAR; INTRAVENOUS ONCE
Status: COMPLETED | OUTPATIENT
Start: 2019-12-12 | End: 2019-12-13

## 2019-12-12 RX ORDER — FENTANYL CITRATE 50 UG/ML
50-100 INJECTION, SOLUTION INTRAMUSCULAR; INTRAVENOUS
Status: DISCONTINUED | OUTPATIENT
Start: 2019-12-12 | End: 2019-12-13

## 2019-12-12 RX ORDER — IBUPROFEN 800 MG/1
800 TABLET, FILM COATED ORAL
Status: DISCONTINUED | OUTPATIENT
Start: 2019-12-12 | End: 2019-12-13

## 2019-12-12 RX ORDER — EPHEDRINE SULFATE 50 MG/ML
5 INJECTION, SOLUTION INTRAMUSCULAR; INTRAVENOUS; SUBCUTANEOUS
Status: DISCONTINUED | OUTPATIENT
Start: 2019-12-12 | End: 2019-12-13

## 2019-12-12 RX ORDER — METHYLERGONOVINE MALEATE 0.2 MG/ML
200 INJECTION INTRAVENOUS
Status: DISCONTINUED | OUTPATIENT
Start: 2019-12-12 | End: 2019-12-13

## 2019-12-12 RX ORDER — CARBOPROST TROMETHAMINE 250 UG/ML
250 INJECTION, SOLUTION INTRAMUSCULAR
Status: DISCONTINUED | OUTPATIENT
Start: 2019-12-12 | End: 2019-12-13

## 2019-12-12 RX ORDER — LIDOCAINE HYDROCHLORIDE AND EPINEPHRINE 15; 5 MG/ML; UG/ML
INJECTION, SOLUTION EPIDURAL PRN
Status: DISCONTINUED | OUTPATIENT
Start: 2019-12-12 | End: 2019-12-12 | Stop reason: HOSPADM

## 2019-12-12 RX ORDER — OXYTOCIN/0.9 % SODIUM CHLORIDE 30/500 ML
100-340 PLASTIC BAG, INJECTION (ML) INTRAVENOUS CONTINUOUS PRN
Status: DISCONTINUED | OUTPATIENT
Start: 2019-12-12 | End: 2019-12-13

## 2019-12-12 RX ADMIN — LIDOCAINE HYDROCHLORIDE,EPINEPHRINE BITARTRATE 3 ML: 15; .005 INJECTION, SOLUTION EPIDURAL; INFILTRATION; INTRACAUDAL; PERINEURAL at 17:52

## 2019-12-12 RX ADMIN — SODIUM CHLORIDE, POTASSIUM CHLORIDE, SODIUM LACTATE AND CALCIUM CHLORIDE: 600; 310; 30; 20 INJECTION, SOLUTION INTRAVENOUS at 13:06

## 2019-12-12 RX ADMIN — FENTANYL CITRATE 100 MCG: 50 INJECTION, SOLUTION INTRAMUSCULAR; INTRAVENOUS at 16:31

## 2019-12-12 RX ADMIN — SODIUM CHLORIDE, POTASSIUM CHLORIDE, SODIUM LACTATE AND CALCIUM CHLORIDE: 600; 310; 30; 20 INJECTION, SOLUTION INTRAVENOUS at 17:53

## 2019-12-12 RX ADMIN — Medication 10 ML/HR: at 17:56

## 2019-12-12 RX ADMIN — SODIUM CHLORIDE, POTASSIUM CHLORIDE, SODIUM LACTATE AND CALCIUM CHLORIDE 500 ML: 600; 310; 30; 20 INJECTION, SOLUTION INTRAVENOUS at 12:15

## 2019-12-12 RX ADMIN — PENICILLIN G POTASSIUM 5 MILLION UNITS: 5000000 POWDER, FOR SOLUTION INTRAMUSCULAR; INTRAPLEURAL; INTRATHECAL; INTRAVENOUS at 23:16

## 2019-12-12 RX ADMIN — Medication 25 MCG: at 09:44

## 2019-12-12 RX ADMIN — SODIUM CHLORIDE, POTASSIUM CHLORIDE, SODIUM LACTATE AND CALCIUM CHLORIDE 1000 ML: 600; 310; 30; 20 INJECTION, SOLUTION INTRAVENOUS at 17:02

## 2019-12-12 NOTE — PROVIDER NOTIFICATION
12/12/19 1400   Provider Notification   Provider Name/Title DR Pruitt   Method of Notification Electronic Page   Notification Reason Patient Request   Pt clemencia too frequently for 2nd miso. Agreeable to latham bulb placement but would like staff provider to place it vs the resident.  Gathering nitrous for placement per Pt request

## 2019-12-12 NOTE — PROGRESS NOTES
S: patient uncomfortable with contractions. Ready to try latham bulb placement, has nitrous at bedside.   O: Temp: 98.8  F (37.1  C) Temp src: Oral BP: 118/77     Resp: 18        EFM reassuring and category I with cycles of minimal variability. No decels.  TOCO Q2-4 minutes  Cervix 1+/60/-1/vertex/mid  Latham bulb placed without difficulty and bulb inflated with 70cc NS  Well tolerated with nitrous gas use    A/P:   38yo   at 41+1 weeks  IOL for late term gestation  S/p 2 doses vaginal misoprostol  Now s/p latham bulb insertion    Albina Pruitt MD

## 2019-12-12 NOTE — ANESTHESIA PREPROCEDURE EVALUATION
Anesthesia Pre-Procedure Evaluation    Patient: Ana Cleveland   MRN:     [id number] Gender:   female   Age:    39 year old :      1980        Preoperative Diagnosis: * No pre-op diagnosis entered *   * No procedures listed *     Past Medical History:   Diagnosis Date     Fibroadenoma of breast, left     bx  benign has clips in place      Psoriasis       Past Surgical History:   Procedure Laterality Date     DENTAL SURGERY      [place] teeth     DILATION AND CURETTAGE SUCTION N/A 2018    Procedure: DILATION AND CURETTAGE SUCTION   (12 WEEKS);  Surgeon: Tyesha Anthony MD;  Location:  OR     US BREAST BIOPSY LT            Anesthesia Evaluation       history and physical reviewed .             ROS/MED HX    ENT/Pulmonary:  - neg pulmonary ROS     Neurologic:  - neg neurologic ROS     Cardiovascular:  - neg cardiovascular ROS       METS/Exercise Tolerance:     Hematologic:         Musculoskeletal:         GI/Hepatic:  - neg GI/hepatic ROS       Renal/Genitourinary:         Endo:         Psychiatric:         Infectious Disease:         Malignancy:         Other:                     JZG FV AN PHYSICAL EXAM    LABS:  CBC:   Lab Results   Component Value Date    WBC 11.3 (H) 2019    WBC 9.5 2019    HGB 11.2 (L) 2019    HGB 11.9 2019    HCT 36.2 2019    HCT 38.7 2019     2019     2019     BMP:   Lab Results   Component Value Date    GLC 86 2018     COAGS: No results found for: PTT, INR, FIBR  POC:   Lab Results   Component Value Date    HCG Negative 2018     OTHER: No results found for: PH, LACT, A1C, DANIELLA, PHOS, MAG, ALBUMIN, PROTTOTAL, ALT, AST, GGT, ALKPHOS, BILITOTAL, BILIDIRECT, LIPASE, AMYLASE, KATIA, TSH, T4, T3, CRP, SED     Preop Vitals    BP Readings from Last 3 Encounters:   19 118/77   12/10/19 105/72   19 127/79    Pulse Readings from Last 3 Encounters:   12/10/19 99   19 112   19  "98      Resp Readings from Last 3 Encounters:   12/12/19 18   11/30/18 16   11/28/18 16    SpO2 Readings from Last 3 Encounters:   11/30/18 98%      Temp Readings from Last 1 Encounters:   12/12/19 37.1  C (98.8  F) (Oral)    Ht Readings from Last 1 Encounters:   12/03/19 1.626 m (5' 4\")      Wt Readings from Last 1 Encounters:   12/10/19 92.5 kg (204 lb)    Estimated body mass index is 35.02 kg/m  as calculated from the following:    Height as of 12/3/19: 1.626 m (5' 4\").    Weight as of 12/10/19: 92.5 kg (204 lb).     LDA:  Peripheral IV 12/12/19 Left Lower forearm (Active)   Site Assessment WDL 12/12/2019 12:30 PM   Line Status Infusing 12/12/2019 12:30 PM   Phlebitis Scale 0-->no symptoms 12/12/2019 12:30 PM   Infiltration Scale 0 12/12/2019 12:30 PM   Number of days: 0        JZG FV AN PLAN NO PONV RULE          Beti Lechuga MD  "

## 2019-12-12 NOTE — PROVIDER NOTIFICATION
12/12/19 1336   Provider Notification   Provider Name/Title Dr Oreilly   Method of Notification Electronic Page   Notification Reason Variability Change;Uterine Activity   Ana is due for 2nd miso at 1345. Mireille too frequently for 2nd miso. FHTs have also changed from moderate to minimal over past 15 minutes, no acels, no decels. Would you like to assess for latham bulb placement?   2018     Alma Rosa Schulz, [address] [facility] [address]    Patient: An Rasmussen   YOB: 1988   Date of Visit: 2018       Dear Dr Abbey Glover: Thank you for referring Siva Quigley to me for evaluation  Below are my notes for this consultation  If you have questions, please do not hesitate to call me  I look forward to following your patient along with you  Sincerely,         [facility]        CC: No Recipients  Solomon Bonilla MD  2018  5:13 PM  Sign at close encounter  Please refer to the [facility] ultrasound report in OB procedures for additional information regarding the visit today

## 2019-12-12 NOTE — H&P
[facility]  OB History and Physical      Ana Cleveland MRN# [id number]   Age: 39 year old YOB: 1980       HPI:  Ms. Ana Cleveland is a 39 year old  at 41w1d by LMP c/w 8w2d by US here for IOL for late term gestation. She denies contractions, vaginal bleeding, or loss of fluid. She states that she has generally been feeling well for the past several weeks. Endorses significant fetal movement. Denies headache, chest pain, blurry vision, and abdominal pain. Ana says that she is very ready to be done being pregnant. She has family history of macrosomia and says that both her mom and sister delivered their babies by ; she is nervous about baby being too big for vaginal delivery. Reassured that there is currently no indication for . Understands that after induction, labor will likely take place over the course of days, not hours.     Pregnancy Complications:  -  AMA  -  Low lying placenta, resolved  -  S/p stripping of membranes 12/3    Prenatal Labs:   Lab Results   Component Value Date    ABO A 2019    RH Pos 2019    AS Neg 2019    HEPBANG Nonreactive 2019    CHPCRT Negative 05/15/2019    GCPCRT Negative 05/15/2019    HGB 11.9 2019       GBS Status:   Lab Results   Component Value Date    GBS Positive (A) 2019       Ultrasounds  US 10/09/2019: Single fetus  Presentation - cephalic  EGA = 32 6/7 weeks.  EFW = 2,056 grams, 65% for 32 weeks.  Fetal anatomy visualized and appears normal.  PATRICIA = 20.1cm.  FHR = 166bpm .    Placenta anterior, no previa.   Comments: AGA    OB History  OB History    Para Term  AB Living   2 0 0 0 1 0   SAB TAB Ectopic Multiple Live Births   1 0 0 0 0      # Outcome Date GA Lbr Mauro/2nd Weight Sex Delivery Anes PTL Lv   2 Current            1 SAB 18 12w5d    AB, MISSED         Birth Comments: D&C to be scheduled . Desires genetics       PMHx:   Past Medical History:    Diagnosis Date     Fibroadenoma of breast, left 2016    bx  benign has clips in place      Psoriasis      PSHx:   Past Surgical History:   Procedure Laterality Date     DENTAL SURGERY  1999    [place] teeth     DILATION AND CURETTAGE SUCTION N/A 11/30/2018    Procedure: DILATION AND CURETTAGE SUCTION   (12 WEEKS);  Surgeon: Tyesha Anthony MD;  Location:  OR     US BREAST BIOPSY LT  2016     Meds:   Medications Prior to Admission   Medication Sig Dispense Refill Last Dose     Cholecalciferol 4000 units CAPS    12/12/2019 at Unknown time     Prenatal Vit-Fe Fumarate-FA (PRENATAL MULTIVITAMIN PLUS IRON) 27-0.8 MG TABS per tablet Take 1 tablet by mouth daily 100 tablet 3 12/12/2019 at Unknown time     Allergies:  No Known Allergies   FmHx:   Family History   Problem Relation Age of Onset     Skin Cancer Mother         basal, squamous cell      Skin Cancer Father         melanoma , squamous cell and basal cell      Skin Cancer Maternal Grandmother      Skin Cancer Maternal Grandfather      Skin Cancer Paternal Grandmother      Glaucoma Brother         dx  at birth  glaucoma  [place] Colt syndrome        Neurofibromatosis Nephew         dx as a baby  brain tumors surgery age 3      Other - See Comments Niece 11        Graves Diesase     SocHx: She denies any tobacco, alcohol, or other drug use during this pregnancy.    ROS:   Complete 10-point ROS negative except as noted in HPI.  PE:  Vit:   Patient Vitals for the past 4 hrs:   BP Temp Temp src Resp   12/12/19 0805 -- 98.8  F (37.1  C) Oral 18   12/12/19 0800 117/74 -- -- --      Gen: Well-appearing, NAD, comfortable, resting in bed  CV: rrr, no mrg  Pulm: Ctab, no wheezes or crackles  Abd: Soft, gravid, non-tender, +BS  Ext: trace LE edema b/l    SVE: 1/60/-3  Pres: Cephalic by US  EFW:  9# by Leopold  Memb: Intact          FHT: Baseline 150, moderate variability, + accelerations, no decelerations   [place]: Irritability with occasional  contractions    Assessment  Ms. Ana Cleveland is a 39 year old  at 41w1d by LMP c/w 8w2d by US here for IOL 2/2 late term gestation.    Plan  Admit to L&D.    #Induction of Labor  - Admit for IOL in the setting of late term gestation  - Cervix: Bishops score 4, unfavorable   - Given above Bishops score, will plan to start misoprostol for cervical ripening; will consider Latham later in day  - Membranes: intact  - Labs: Hgb, plt, T&S, RPR  - GBS positive; Start PCN after cervical ripening. Will wait to rupture until adequate on antibiotics.  - Pain Control: Per patient request; Discussed options    - Will consider nitrous oxide for latham placement   - Desires epidural in active labor.   - Diet: Regular diet  - PPH Meds: None    #FWB: Category I FHT.  Continue EFM and toco  - GBS +  - EFW 9#, vtx by BSUS, placenta posterior (low lying placenta, resolved)    #PNC: Rh +, Rubella immune, GCT nl, anatomy nl  - contraception not yet discussed    The patient was discussed with Dr. Pruitt who is in agreement with the treatment plan.    Susanne Sanchez, MS3    I was present with the medical student who participated in the service and in the documentation of this note.  I have verified the history and personally performed the physical exam and medical decision making, and have verified the content of the note, which accurately reflect my assessment of the patient and the plan of care.    Jemima Oreilly MD  OBGYN PGY-2  8:38 AM 2019    I have seen and examined the patient with the resident and student.   I supervised the bedside ultrasound and agree with the findings documented.  I have reviewed, edited, and agree with the note.     Albina Pruitt MD

## 2019-12-12 NOTE — PROVIDER NOTIFICATION
12/12/19 1415   Provider Notification   Provider Name/Title DR Pruitt and Dr Ayers   Method of Notification In Department   Notification Reason Status Update   Pt comfortable with resident placing latham bulb after further understanding of resident experience.

## 2019-12-12 NOTE — PROVIDER NOTIFICATION
12/12/19 0935   Provider Notification   Provider Name/Title DR Oreilly   Method of Notification In Department   Notification Reason Uterine Activity   Uterine activity reviewed prior to vaginal miso placement. [place] shows frequent irritability with contractions with 2 contractions in the last 30 minutes. Pt not feeling uterine activity and difficult to palpate uterine activity. FHTs w/moderate variability and acels, no decels. PIV in place if needed. PLan to give vaginal miso with close monitoring of uterine activity and fetal heart rate.

## 2019-12-12 NOTE — PROVIDER NOTIFICATION
12/12/19 0905   Provider Notification   Provider Name/Title DR Pruitt and Dr Oreilly   Method of Notification At Bedside   Pt presents for IOL due to post dates gestation. BSUS confirms cephalic position. SVE 1/60/-3. Leblanc score of 4. Based on SVE options discussed included vaginal miso, latham balloon, or both. Ana would like to start with vaginal miso. FHTs reactive. Uterine toco shows irritability with some contractions which Pt is not feeling and difficult to palpate. Plan to begin with vaginal miso.

## 2019-12-12 NOTE — PROVIDER NOTIFICATION
12/12/19 7320   Provider Notification   Provider Name/Title DR Pruitt   Method of Notification At Bedside   SVE 1+/60/-1. Pt agreeable to latham bulb placement. Bulb placed without difficulty by Dr Pruitt. Pt complained of appropriate discomfort. Nitrous and warm packs being used for discomfort.  Strip reviewed due to periods of minimal variability, overall FHTs have been moderate with accelerations and no decelrations. Plan to continue to monitor closely.

## 2019-12-12 NOTE — PROVIDER NOTIFICATION
12/12/19 1208   Provider Notification   Provider Name/Title Dr Oreilly   Method of Notification In Department   Notification Reason Fetal Baseline Change;Uterine Activity;Status Update   Uterine contraction every 1.5-2 minutes. FHTs remain with moderate variability and no decelerations. FBL up from 145 to 155/160. Plan for IV fluid bolus.

## 2019-12-12 NOTE — PROVIDER NOTIFICATION
12/12/19 5671   Provider Notification   Provider Name/Title Dr Pruitt and DR Ayers   Notification Reason Uterine Activity;Pain   Pt with uterine tachysystole, again. FHTs with mod variability, no decels, no recent acels. Pt requesting epidural. Can you consult with anesthesia. Do you want terbutaline?

## 2019-12-13 ENCOUNTER — ANESTHESIA EVENT (OUTPATIENT)
Dept: OBGYN | Facility: CLINIC | Age: 39
End: 2019-12-13
Payer: COMMERCIAL

## 2019-12-13 ENCOUNTER — ANESTHESIA (OUTPATIENT)
Dept: OBGYN | Facility: CLINIC | Age: 39
End: 2019-12-13
Payer: COMMERCIAL

## 2019-12-13 PROBLEM — Z98.891 S/P CESAREAN SECTION: Status: ACTIVE | Noted: 2019-12-13

## 2019-12-13 LAB
ERYTHROCYTE [DISTWIDTH] IN BLOOD BY AUTOMATED COUNT: 15 % (ref 10–15)
HCT VFR BLD AUTO: 28.3 % (ref 35–47)
HGB BLD-MCNC: 8.8 G/DL (ref 11.7–15.7)
MCH RBC QN AUTO: 28.6 PG (ref 26.5–33)
MCHC RBC AUTO-ENTMCNC: 31.1 G/DL (ref 31.5–36.5)
MCV RBC AUTO: 92 FL (ref 78–100)
PLATELET # BLD AUTO: 201 10E9/L (ref 150–450)
RBC # BLD AUTO: 3.08 10E12/L (ref 3.8–5.2)
WBC # BLD AUTO: 23 10E9/L (ref 4–11)

## 2019-12-13 PROCEDURE — 25800030 ZZH RX IP 258 OP 636: Performed by: NURSE ANESTHETIST, CERTIFIED REGISTERED

## 2019-12-13 PROCEDURE — 25000128 H RX IP 250 OP 636: Performed by: ANESTHESIOLOGY

## 2019-12-13 PROCEDURE — 40000977 ZZH STATISTIC ATTENDANCE AT DELIVERY

## 2019-12-13 PROCEDURE — 36000059 ZZH SURGERY LEVEL 3 EA 15 ADDTL MIN UMMC: Performed by: OBSTETRICS & GYNECOLOGY

## 2019-12-13 PROCEDURE — 25800030 ZZH RX IP 258 OP 636

## 2019-12-13 PROCEDURE — 25000132 ZZH RX MED GY IP 250 OP 250 PS 637: Performed by: STUDENT IN AN ORGANIZED HEALTH CARE EDUCATION/TRAINING PROGRAM

## 2019-12-13 PROCEDURE — 37000009 ZZH ANESTHESIA TECHNICAL FEE, EACH ADDTL 15 MIN: Performed by: OBSTETRICS & GYNECOLOGY

## 2019-12-13 PROCEDURE — 25000128 H RX IP 250 OP 636: Performed by: STUDENT IN AN ORGANIZED HEALTH CARE EDUCATION/TRAINING PROGRAM

## 2019-12-13 PROCEDURE — 71000014 ZZH RECOVERY PHASE 1 LEVEL 2 FIRST HR: Performed by: OBSTETRICS & GYNECOLOGY

## 2019-12-13 PROCEDURE — 27210794 ZZH OR GENERAL SUPPLY STERILE: Performed by: OBSTETRICS & GYNECOLOGY

## 2019-12-13 PROCEDURE — 25000128 H RX IP 250 OP 636: Performed by: NURSE ANESTHETIST, CERTIFIED REGISTERED

## 2019-12-13 PROCEDURE — 71000015 ZZH RECOVERY PHASE 1 LEVEL 2 EA ADDTL HR: Performed by: OBSTETRICS & GYNECOLOGY

## 2019-12-13 PROCEDURE — 25800030 ZZH RX IP 258 OP 636: Performed by: STUDENT IN AN ORGANIZED HEALTH CARE EDUCATION/TRAINING PROGRAM

## 2019-12-13 PROCEDURE — 40000170 ZZH STATISTIC PRE-PROCEDURE ASSESSMENT II: Performed by: OBSTETRICS & GYNECOLOGY

## 2019-12-13 PROCEDURE — 36000057 ZZH SURGERY LEVEL 3 1ST 30 MIN - UMMC: Performed by: OBSTETRICS & GYNECOLOGY

## 2019-12-13 PROCEDURE — 85027 COMPLETE CBC AUTOMATED: CPT | Performed by: STUDENT IN AN ORGANIZED HEALTH CARE EDUCATION/TRAINING PROGRAM

## 2019-12-13 PROCEDURE — 25000125 ZZHC RX 250: Performed by: NURSE ANESTHETIST, CERTIFIED REGISTERED

## 2019-12-13 PROCEDURE — 25000128 H RX IP 250 OP 636

## 2019-12-13 PROCEDURE — 25000565 ZZH ISOFLURANE, EA 15 MIN: Performed by: OBSTETRICS & GYNECOLOGY

## 2019-12-13 PROCEDURE — 25000125 ZZHC RX 250: Performed by: STUDENT IN AN ORGANIZED HEALTH CARE EDUCATION/TRAINING PROGRAM

## 2019-12-13 PROCEDURE — C9290 INJ, BUPIVACAINE LIPOSOME: HCPCS | Performed by: ANESTHESIOLOGY

## 2019-12-13 PROCEDURE — 27110028 ZZH OR GENERAL SUPPLY NON-STERILE: Performed by: OBSTETRICS & GYNECOLOGY

## 2019-12-13 PROCEDURE — 36415 COLL VENOUS BLD VENIPUNCTURE: CPT | Performed by: STUDENT IN AN ORGANIZED HEALTH CARE EDUCATION/TRAINING PROGRAM

## 2019-12-13 PROCEDURE — 12000001 ZZH R&B MED SURG/OB UMMC

## 2019-12-13 PROCEDURE — 37000008 ZZH ANESTHESIA TECHNICAL FEE, 1ST 30 MIN: Performed by: OBSTETRICS & GYNECOLOGY

## 2019-12-13 RX ORDER — FENTANYL CITRATE 50 UG/ML
25-50 INJECTION, SOLUTION INTRAMUSCULAR; INTRAVENOUS
Status: DISCONTINUED | OUTPATIENT
Start: 2019-12-13 | End: 2019-12-13 | Stop reason: HOSPADM

## 2019-12-13 RX ORDER — TERBUTALINE SULFATE 1 MG/ML
0.25 INJECTION, SOLUTION SUBCUTANEOUS
Status: DISCONTINUED | OUTPATIENT
Start: 2019-12-13 | End: 2019-12-13

## 2019-12-13 RX ORDER — CEFAZOLIN SODIUM 2 G/100ML
2 INJECTION, SOLUTION INTRAVENOUS
Status: DISCONTINUED | OUTPATIENT
Start: 2019-12-13 | End: 2019-12-13

## 2019-12-13 RX ORDER — OXYCODONE HYDROCHLORIDE 5 MG/1
5-10 TABLET ORAL
Status: DISCONTINUED | OUTPATIENT
Start: 2019-12-13 | End: 2019-12-15

## 2019-12-13 RX ORDER — LANOLIN 100 %
OINTMENT (GRAM) TOPICAL
Status: DISCONTINUED | OUTPATIENT
Start: 2019-12-13 | End: 2019-12-16 | Stop reason: HOSPADM

## 2019-12-13 RX ORDER — BISACODYL 10 MG
10 SUPPOSITORY, RECTAL RECTAL DAILY PRN
Status: DISCONTINUED | OUTPATIENT
Start: 2019-12-15 | End: 2019-12-16 | Stop reason: HOSPADM

## 2019-12-13 RX ORDER — OXYTOCIN 10 [USP'U]/ML
10 INJECTION, SOLUTION INTRAMUSCULAR; INTRAVENOUS
Status: DISCONTINUED | OUTPATIENT
Start: 2019-12-13 | End: 2019-12-16 | Stop reason: HOSPADM

## 2019-12-13 RX ORDER — KETOROLAC TROMETHAMINE 30 MG/ML
INJECTION, SOLUTION INTRAMUSCULAR; INTRAVENOUS PRN
Status: DISCONTINUED | OUTPATIENT
Start: 2019-12-13 | End: 2019-12-13

## 2019-12-13 RX ORDER — DEXTROSE, SODIUM CHLORIDE, SODIUM LACTATE, POTASSIUM CHLORIDE, AND CALCIUM CHLORIDE 5; .6; .31; .03; .02 G/100ML; G/100ML; G/100ML; G/100ML; G/100ML
INJECTION, SOLUTION INTRAVENOUS CONTINUOUS
Status: DISCONTINUED | OUTPATIENT
Start: 2019-12-13 | End: 2019-12-16 | Stop reason: HOSPADM

## 2019-12-13 RX ORDER — OXYTOCIN 10 [USP'U]/ML
INJECTION, SOLUTION INTRAMUSCULAR; INTRAVENOUS
Status: DISCONTINUED
Start: 2019-12-13 | End: 2019-12-13 | Stop reason: HOSPADM

## 2019-12-13 RX ORDER — BUPIVACAINE HYDROCHLORIDE 2.5 MG/ML
INJECTION, SOLUTION EPIDURAL; INFILTRATION; INTRACAUDAL PRN
Status: DISCONTINUED | OUTPATIENT
Start: 2019-12-13 | End: 2019-12-13

## 2019-12-13 RX ORDER — ACETAMINOPHEN 325 MG/1
650 TABLET ORAL EVERY 4 HOURS PRN
Status: DISCONTINUED | OUTPATIENT
Start: 2019-12-16 | End: 2019-12-16 | Stop reason: HOSPADM

## 2019-12-13 RX ORDER — LIDOCAINE HYDROCHLORIDE 10 MG/ML
INJECTION, SOLUTION EPIDURAL; INFILTRATION; INTRACAUDAL; PERINEURAL
Status: DISCONTINUED
Start: 2019-12-13 | End: 2019-12-13 | Stop reason: HOSPADM

## 2019-12-13 RX ORDER — HYDROMORPHONE HYDROCHLORIDE 1 MG/ML
.3-.5 INJECTION, SOLUTION INTRAMUSCULAR; INTRAVENOUS; SUBCUTANEOUS EVERY 5 MIN PRN
Status: DISCONTINUED | OUTPATIENT
Start: 2019-12-13 | End: 2019-12-13 | Stop reason: HOSPADM

## 2019-12-13 RX ORDER — OXYTOCIN/0.9 % SODIUM CHLORIDE 30/500 ML
1-24 PLASTIC BAG, INJECTION (ML) INTRAVENOUS CONTINUOUS
Status: DISCONTINUED | OUTPATIENT
Start: 2019-12-13 | End: 2019-12-13

## 2019-12-13 RX ORDER — OXYTOCIN/0.9 % SODIUM CHLORIDE 30/500 ML
PLASTIC BAG, INJECTION (ML) INTRAVENOUS CONTINUOUS PRN
Status: DISCONTINUED | OUTPATIENT
Start: 2019-12-13 | End: 2019-12-13

## 2019-12-13 RX ORDER — SODIUM CHLORIDE, SODIUM LACTATE, POTASSIUM CHLORIDE, CALCIUM CHLORIDE 600; 310; 30; 20 MG/100ML; MG/100ML; MG/100ML; MG/100ML
INJECTION, SOLUTION INTRAVENOUS CONTINUOUS
Status: DISCONTINUED | OUTPATIENT
Start: 2019-12-13 | End: 2019-12-13

## 2019-12-13 RX ORDER — IBUPROFEN 800 MG/1
800 TABLET, FILM COATED ORAL EVERY 6 HOURS PRN
Status: DISCONTINUED | OUTPATIENT
Start: 2019-12-14 | End: 2019-12-16 | Stop reason: HOSPADM

## 2019-12-13 RX ORDER — SODIUM CHLORIDE, SODIUM LACTATE, POTASSIUM CHLORIDE, CALCIUM CHLORIDE 600; 310; 30; 20 MG/100ML; MG/100ML; MG/100ML; MG/100ML
INJECTION, SOLUTION INTRAVENOUS CONTINUOUS PRN
Status: DISCONTINUED | OUTPATIENT
Start: 2019-12-13 | End: 2019-12-13

## 2019-12-13 RX ORDER — BUPIVACAINE HYDROCHLORIDE 7.5 MG/ML
INJECTION, SOLUTION INTRASPINAL PRN
Status: DISCONTINUED | OUTPATIENT
Start: 2019-12-13 | End: 2019-12-13

## 2019-12-13 RX ORDER — ACETAMINOPHEN 325 MG/1
975 TABLET ORAL EVERY 8 HOURS
Status: COMPLETED | OUTPATIENT
Start: 2019-12-13 | End: 2019-12-16

## 2019-12-13 RX ORDER — OXYTOCIN/0.9 % SODIUM CHLORIDE 30/500 ML
PLASTIC BAG, INJECTION (ML) INTRAVENOUS
Status: DISCONTINUED
Start: 2019-12-13 | End: 2019-12-13 | Stop reason: HOSPADM

## 2019-12-13 RX ORDER — OXYTOCIN/0.9 % SODIUM CHLORIDE 30/500 ML
100 PLASTIC BAG, INJECTION (ML) INTRAVENOUS CONTINUOUS
Status: DISCONTINUED | OUTPATIENT
Start: 2019-12-13 | End: 2019-12-16 | Stop reason: HOSPADM

## 2019-12-13 RX ORDER — FENTANYL/BUPIVACAINE/NS/PF 2-1250MCG
PLASTIC BAG, INJECTION (ML) INJECTION
Status: COMPLETED
Start: 2019-12-13 | End: 2019-12-13

## 2019-12-13 RX ORDER — PROPOFOL 10 MG/ML
INJECTION, EMULSION INTRAVENOUS PRN
Status: DISCONTINUED | OUTPATIENT
Start: 2019-12-13 | End: 2019-12-13

## 2019-12-13 RX ORDER — METHYLERGONOVINE MALEATE 0.2 MG/ML
INJECTION INTRAVENOUS PRN
Status: DISCONTINUED | OUTPATIENT
Start: 2019-12-13 | End: 2019-12-13

## 2019-12-13 RX ORDER — AMOXICILLIN 250 MG
1 CAPSULE ORAL 2 TIMES DAILY PRN
Status: DISCONTINUED | OUTPATIENT
Start: 2019-12-13 | End: 2019-12-16 | Stop reason: HOSPADM

## 2019-12-13 RX ORDER — ONDANSETRON 2 MG/ML
4 INJECTION INTRAMUSCULAR; INTRAVENOUS EVERY 6 HOURS PRN
Status: DISCONTINUED | OUTPATIENT
Start: 2019-12-13 | End: 2019-12-16 | Stop reason: HOSPADM

## 2019-12-13 RX ORDER — LIDOCAINE 40 MG/G
CREAM TOPICAL
Status: DISCONTINUED | OUTPATIENT
Start: 2019-12-13 | End: 2019-12-16 | Stop reason: HOSPADM

## 2019-12-13 RX ORDER — MISOPROSTOL 200 UG/1
800 TABLET ORAL
Status: DISCONTINUED | OUTPATIENT
Start: 2019-12-13 | End: 2019-12-16 | Stop reason: HOSPADM

## 2019-12-13 RX ORDER — CEFAZOLIN SODIUM 1 G/3ML
1 INJECTION, POWDER, FOR SOLUTION INTRAMUSCULAR; INTRAVENOUS SEE ADMIN INSTRUCTIONS
Status: DISCONTINUED | OUTPATIENT
Start: 2019-12-13 | End: 2019-12-13

## 2019-12-13 RX ORDER — MORPHINE SULFATE 1 MG/ML
INJECTION, SOLUTION EPIDURAL; INTRATHECAL; INTRAVENOUS PRN
Status: DISCONTINUED | OUTPATIENT
Start: 2019-12-13 | End: 2019-12-13

## 2019-12-13 RX ORDER — ONDANSETRON 4 MG/1
4 TABLET, ORALLY DISINTEGRATING ORAL EVERY 30 MIN PRN
Status: DISCONTINUED | OUTPATIENT
Start: 2019-12-13 | End: 2019-12-13 | Stop reason: HOSPADM

## 2019-12-13 RX ORDER — MISOPROSTOL 200 UG/1
TABLET ORAL
Status: DISCONTINUED
Start: 2019-12-13 | End: 2019-12-13 | Stop reason: HOSPADM

## 2019-12-13 RX ORDER — SODIUM CHLORIDE, SODIUM LACTATE, POTASSIUM CHLORIDE, CALCIUM CHLORIDE 600; 310; 30; 20 MG/100ML; MG/100ML; MG/100ML; MG/100ML
INJECTION, SOLUTION INTRAVENOUS CONTINUOUS
Status: DISCONTINUED | OUTPATIENT
Start: 2019-12-13 | End: 2019-12-13 | Stop reason: HOSPADM

## 2019-12-13 RX ORDER — HYDROCORTISONE 2.5 %
CREAM (GRAM) TOPICAL 3 TIMES DAILY PRN
Status: DISCONTINUED | OUTPATIENT
Start: 2019-12-13 | End: 2019-12-16 | Stop reason: HOSPADM

## 2019-12-13 RX ORDER — AMOXICILLIN 250 MG
2 CAPSULE ORAL 2 TIMES DAILY PRN
Status: DISCONTINUED | OUTPATIENT
Start: 2019-12-13 | End: 2019-12-16 | Stop reason: HOSPADM

## 2019-12-13 RX ORDER — CITRIC ACID/SODIUM CITRATE 334-500MG
30 SOLUTION, ORAL ORAL
Status: COMPLETED | OUTPATIENT
Start: 2019-12-13 | End: 2019-12-13

## 2019-12-13 RX ORDER — NALOXONE HYDROCHLORIDE 0.4 MG/ML
.1-.4 INJECTION, SOLUTION INTRAMUSCULAR; INTRAVENOUS; SUBCUTANEOUS
Status: DISCONTINUED | OUTPATIENT
Start: 2019-12-13 | End: 2019-12-13

## 2019-12-13 RX ORDER — FENTANYL CITRATE 50 UG/ML
INJECTION, SOLUTION INTRAMUSCULAR; INTRAVENOUS PRN
Status: DISCONTINUED | OUTPATIENT
Start: 2019-12-13 | End: 2019-12-13

## 2019-12-13 RX ORDER — OXYTOCIN/0.9 % SODIUM CHLORIDE 30/500 ML
340 PLASTIC BAG, INJECTION (ML) INTRAVENOUS CONTINUOUS PRN
Status: DISCONTINUED | OUTPATIENT
Start: 2019-12-13 | End: 2019-12-16 | Stop reason: HOSPADM

## 2019-12-13 RX ORDER — SIMETHICONE 80 MG
80 TABLET,CHEWABLE ORAL 4 TIMES DAILY PRN
Status: DISCONTINUED | OUTPATIENT
Start: 2019-12-13 | End: 2019-12-16 | Stop reason: HOSPADM

## 2019-12-13 RX ORDER — ONDANSETRON 2 MG/ML
4 INJECTION INTRAMUSCULAR; INTRAVENOUS EVERY 30 MIN PRN
Status: DISCONTINUED | OUTPATIENT
Start: 2019-12-13 | End: 2019-12-13 | Stop reason: HOSPADM

## 2019-12-13 RX ORDER — NALOXONE HYDROCHLORIDE 0.4 MG/ML
.1-.4 INJECTION, SOLUTION INTRAMUSCULAR; INTRAVENOUS; SUBCUTANEOUS
Status: DISCONTINUED | OUTPATIENT
Start: 2019-12-13 | End: 2019-12-16 | Stop reason: HOSPADM

## 2019-12-13 RX ORDER — KETOROLAC TROMETHAMINE 30 MG/ML
30 INJECTION, SOLUTION INTRAMUSCULAR; INTRAVENOUS EVERY 6 HOURS
Status: COMPLETED | OUTPATIENT
Start: 2019-12-13 | End: 2019-12-14

## 2019-12-13 RX ORDER — LIDOCAINE 40 MG/G
CREAM TOPICAL
Status: DISCONTINUED | OUTPATIENT
Start: 2019-12-13 | End: 2019-12-13

## 2019-12-13 RX ADMIN — FENTANYL CITRATE 10 MCG: 50 INJECTION, SOLUTION INTRAMUSCULAR; INTRAVENOUS at 12:11

## 2019-12-13 RX ADMIN — BUPIVACAINE HYDROCHLORIDE 10 ML/HR: 7.5 INJECTION, SOLUTION EPIDURAL; RETROBULBAR at 01:43

## 2019-12-13 RX ADMIN — Medication 2.5 MILLION UNITS: at 07:03

## 2019-12-13 RX ADMIN — OXYCODONE HYDROCHLORIDE 10 MG: 5 TABLET ORAL at 22:32

## 2019-12-13 RX ADMIN — PHENYLEPHRINE HYDROCHLORIDE 100 MCG: 10 INJECTION INTRAVENOUS at 12:59

## 2019-12-13 RX ADMIN — SODIUM CITRATE AND CITRIC ACID MONOHYDRATE 30 ML: 500; 334 SOLUTION ORAL at 11:21

## 2019-12-13 RX ADMIN — FENTANYL CITRATE 25 MCG: 50 INJECTION INTRAMUSCULAR; INTRAVENOUS at 14:28

## 2019-12-13 RX ADMIN — OXYCODONE HYDROCHLORIDE 5 MG: 5 TABLET ORAL at 17:00

## 2019-12-13 RX ADMIN — ACETAMINOPHEN 650 MG: 325 TABLET, FILM COATED ORAL at 14:17

## 2019-12-13 RX ADMIN — KETOROLAC TROMETHAMINE 30 MG: 30 INJECTION, SOLUTION INTRAMUSCULAR at 13:07

## 2019-12-13 RX ADMIN — FENTANYL CITRATE 50 MCG: 50 INJECTION, SOLUTION INTRAMUSCULAR; INTRAVENOUS at 12:43

## 2019-12-13 RX ADMIN — BUPIVACAINE 20 ML: 13.3 INJECTION, SUSPENSION, LIPOSOMAL INFILTRATION at 15:30

## 2019-12-13 RX ADMIN — BUPIVACAINE HYDROCHLORIDE: 7.5 INJECTION, SOLUTION EPIDURAL; RETROBULBAR at 09:19

## 2019-12-13 RX ADMIN — FENTANYL CITRATE 25 MCG: 50 INJECTION INTRAMUSCULAR; INTRAVENOUS at 14:04

## 2019-12-13 RX ADMIN — SODIUM CHLORIDE, SODIUM LACTATE, POTASSIUM CHLORIDE, CALCIUM CHLORIDE: 600; 310; 30; 20 INJECTION, SOLUTION INTRAVENOUS at 11:30

## 2019-12-13 RX ADMIN — FENTANYL CITRATE 40 MCG: 50 INJECTION, SOLUTION INTRAMUSCULAR; INTRAVENOUS at 12:33

## 2019-12-13 RX ADMIN — SODIUM CHLORIDE, POTASSIUM CHLORIDE, SODIUM LACTATE AND CALCIUM CHLORIDE 1000 ML: 600; 310; 30; 20 INJECTION, SOLUTION INTRAVENOUS at 11:17

## 2019-12-13 RX ADMIN — PROPOFOL 200 MG: 10 INJECTION, EMULSION INTRAVENOUS at 12:30

## 2019-12-13 RX ADMIN — HYDROMORPHONE HYDROCHLORIDE 0.5 MG: 1 INJECTION, SOLUTION INTRAMUSCULAR; INTRAVENOUS; SUBCUTANEOUS at 12:57

## 2019-12-13 RX ADMIN — Medication 2.5 MILLION UNITS: at 02:48

## 2019-12-13 RX ADMIN — METHYLERGONOVINE MALEATE 200 MCG: 0.2 INJECTION INTRAMUSCULAR; INTRAVENOUS at 12:36

## 2019-12-13 RX ADMIN — SODIUM CHLORIDE, POTASSIUM CHLORIDE, SODIUM LACTATE AND CALCIUM CHLORIDE: 600; 310; 30; 20 INJECTION, SOLUTION INTRAVENOUS at 02:47

## 2019-12-13 RX ADMIN — MIDAZOLAM 2 MG: 1 INJECTION INTRAMUSCULAR; INTRAVENOUS at 12:38

## 2019-12-13 RX ADMIN — OXYTOCIN-SODIUM CHLORIDE 0.9% IV SOLN 30 UNIT/500ML 300 ML/HR: 30-0.9/5 SOLUTION at 12:32

## 2019-12-13 RX ADMIN — ACETAMINOPHEN 975 MG: 325 TABLET, FILM COATED ORAL at 18:33

## 2019-12-13 RX ADMIN — KETOROLAC TROMETHAMINE 30 MG: 30 INJECTION, SOLUTION INTRAMUSCULAR at 18:42

## 2019-12-13 RX ADMIN — ONDANSETRON 4 MG: 2 INJECTION INTRAMUSCULAR; INTRAVENOUS at 13:07

## 2019-12-13 RX ADMIN — Medication 100 MG: at 12:30

## 2019-12-13 RX ADMIN — BUPIVACAINE HYDROCHLORIDE IN DEXTROSE 1.7 ML: 7.5 INJECTION, SOLUTION SUBARACHNOID at 12:11

## 2019-12-13 RX ADMIN — SENNOSIDES AND DOCUSATE SODIUM 2 TABLET: 8.6; 5 TABLET ORAL at 19:39

## 2019-12-13 RX ADMIN — SODIUM CHLORIDE, SODIUM LACTATE, POTASSIUM CHLORIDE, CALCIUM CHLORIDE: 600; 310; 30; 20 INJECTION, SOLUTION INTRAVENOUS at 13:01

## 2019-12-13 RX ADMIN — Medication 2 MILLI-UNITS/MIN: at 03:17

## 2019-12-13 RX ADMIN — MORPHINE SULFATE 0.1 MG: 1 INJECTION, SOLUTION EPIDURAL; INTRATHECAL; INTRAVENOUS at 12:11

## 2019-12-13 RX ADMIN — BUPIVACAINE HYDROCHLORIDE 20 ML: 2.5 INJECTION, SOLUTION EPIDURAL; INFILTRATION; INTRACAUDAL at 15:30

## 2019-12-13 NOTE — ANESTHESIA PREPROCEDURE EVALUATION
"Anesthesia Pre-Procedure Evaluation    Patient: Ana Cleveland   MRN:     [id number] Gender:   female   Age:    39 year old :      1980        Preoperative Diagnosis: * No pre-op diagnosis entered *   Procedure(s):   SECTION     Past Medical History:   Diagnosis Date     Fibroadenoma of breast, left     bx  benign has clips in place      Psoriasis       Past Surgical History:   Procedure Laterality Date     DENTAL SURGERY      [place] teeth     DILATION AND CURETTAGE SUCTION N/A 2018    Procedure: DILATION AND CURETTAGE SUCTION   (12 WEEKS);  Surgeon: Tyesha Anthony MD;  Location: [facility] OR     US BREAST BIOPSY LT                 JZG FV AN PHYSICAL EXAM    LABS:  CBC:   Lab Results   Component Value Date    WBC 11.3 (H) 2019    WBC 9.5 2019    HGB 11.2 (L) 2019    HGB 11.9 2019    HCT 36.2 2019    HCT 38.7 2019     2019     2019     BMP:   Lab Results   Component Value Date    GLC 86 2018     COAGS: No results found for: PTT, INR, FIBR  POC:   Lab Results   Component Value Date    HCG Negative 2018     OTHER: No results found for: PH, LACT, A1C, DANIELLA, PHOS, MAG, ALBUMIN, PROTTOTAL, ALT, AST, GGT, ALKPHOS, BILITOTAL, BILIDIRECT, LIPASE, AMYLASE, KATIA, TSH, T4, T3, CRP, SED     Preop Vitals    BP Readings from Last 3 Encounters:   19 115/72   12/10/19 105/72   19 127/79    Pulse Readings from Last 3 Encounters:   12/10/19 99   19 112   19 98      Resp Readings from Last 3 Encounters:   19 16   18 16   18 16    SpO2 Readings from Last 3 Encounters:   19 97%   18 98%      Temp Readings from Last 1 Encounters:   19 36.8  C (98.3  F)    Ht Readings from Last 1 Encounters:   19 1.626 m (5' 4\")      Wt Readings from Last 1 Encounters:   12/10/19 92.5 kg (204 lb)    Estimated body mass index is 35.02 kg/m  as calculated from the following:    Height " "as of 12/3/19: 1.626 m (5' 4\").    Weight as of 12/10/19: 92.5 kg (204 lb).     LDA:  Peripheral IV 12/12/19 Left Lower forearm (Active)   Site Assessment WDL 12/13/2019 10:00 AM   Line Status Infusing 12/13/2019 10:00 AM   Phlebitis Scale 0-->no symptoms 12/13/2019 10:00 AM   Infiltration Scale 0 12/13/2019 10:00 AM   Number of days: 1       Intrathecal/Epidural Catheter 12/12/19 (Active)   Site Assessment Clean, dry, intact 12/13/2019 10:00 AM   Line Status Infusing 12/13/2019 10:00 AM   Dressing Type Transparent 12/13/2019 10:00 AM   Dressing Status Dressing  dry and intact 12/13/2019  7:21 AM   Number of days: 1        Assessment:   ASA SCORE: 2 emergent   H&P: History and physical reviewed and following examination; no interval change.    NPO Status: Will be NPO Appropriate at ... 12/13/2019 1:30 PM     Plan:   Anes. Type:  Spinal; Peripheral Nerve Block                    PONV Management: Adult Risk Factors: Female   Prevention: Ondansetron, Dexamethasone     CONSENT: Direct conversation                         Thu Moses MD  "

## 2019-12-13 NOTE — PROGRESS NOTES
Labor Progress Note    S:  Patient comfortable with epidural. Can't feel contractions now.    O:   Patient Vitals for the past 4 hrs:   BP Temp Temp src Resp SpO2   19 1850 119/65 -- -- -- --   19 1842 111/71 -- -- 18 --   19 1837 117/72 -- -- 16 --   19 1830 112/65 -- -- 16 99 %   19 1827 124/70 -- -- 18 --   19 1821 112/64 99.7  F (37.6  C) Axillary 16 100 %   19 1817 127/65 -- -- 18 --   19 1810 121/70 -- -- 16 --   19 1808 120/73 -- -- 16 --   19 1806 121/72 -- -- 18 100 %   19 1804 128/80 -- -- 18 --   19 1802 121/73 -- -- 16 --   19 1800 122/68 -- -- 16 --   19 1758 122/68 -- -- 18 --     SVE: [place] in place    FHT: Baseline 145, moderate variability, + accelerations, intermittent late decelerations  [place]: 4-5 contractions in 10 minutes    A/P:  Ms. Ana Cleveland is a 39 year old  at 41w1d, admitted for IOL for late term gestation.    # IOL  - s/p PV miso x2. [place] in place and on traction. Will reassess position in 1-2 hours. Previously tachysystole, will hold miso as clemencia spontaneously  - comfortable with epidural    # FWB:   - category II, overall reassuring with accels and moderate variability  - intrauterine resuscitation PRN    Luis Enrique Arroyo MD  Obstetrics and Gyncology, PGY-2  2019 7:19 PM

## 2019-12-13 NOTE — OP NOTE
[facility]  Full Operative Progress Note     Surgery Date:  2019    Surgeon:  Tyesha Anthony MD    Assistants:  Jemima Oreilly MD, PGY-2    Pre-op Diagnosis:    - Intrauterine pregnancy at 41w2d  - Category II FHT  - Late term gestation  - Obesity   - AMA    Post-op Diagnosis:    - Same   - Liveborn female infant  - Postpartum hemorrhage due to uterine atony  - Bandl's ring     Procedure:  Primary low-transverse  section with two layer uterine closure via Pfannenstiel incision    Anesthesia: Inadequate epidural, failed spinal, GETA     EBL:  1585 mL    IVF:  1300 mL crystalloid    UOP:  300 mL clear urine at the end of the case    Drains: Merida Catheter     Specimens:  Cord gases, cord blood     Complications: None apparent    Indications:   Ana Cleveland is a 39 year old  at 41w2d admitted for IOL for late term gestation. She underwent cervical ripening with misoprostol and balloon catheter. Her inductio was then continued with IV pitocin. She underwent AROM for clear fluid. She then developed a persistent category II FHT despite intrauterine resuscitation. Her cervix remained at 7 cm. A  section was recommended. The risks, benefits, and alternatives of  section were discussed with the patient, and she agreed to proceed.     Findings:   1. No fascial or intraabdominal adhesions  2. Clear amniotic fluid  3. Liveborn female infant in ROP presentation. Apgars 8 at 1 minute & 9 at 5 minutes. Weight 4070g.  4. Arterial cord pH 7.34, base deficit 3.1. Venous cord pH 7.32, base deficit 3.7.  5. Bandl's ring present in ANISH. Extension of hysterotomy on left side, not into cervix or broad ligament. Otherwise normal uterus, fallopian tubes, and ovaries.     Procedure Details:   The patient was brought to the OR, where her epidural was found to be inadequate, therefore spinal anesthesia was attempted. After multiple attempts, spinal anesthesia was unable  to be placed and therefore the decision was made to perform GETA with rapid sequence intubation. She was placed in the dorsal supine position with a slight leftward tilt. She was prepped and draped in the usual sterile fashion. A surgical time out was performed. GETA with rapid sequence intubation was performed. A pfannenstiel skin incision was made with the scalpel, and carried down to the underlying fascia with sharp and blunt dissection. The fascia was incised in the midline, and the incision was extended laterally with blunt dissection. The superior aspect of the fascia was grasped with the Kocher clamps and dissected off of the underlying rectus muscles with blunt and sharp dissection. Attention was then turned to the inferior aspect of the fascia, which was similarly dissected off of the underlying rectus muscles. The rectus muscles were  in the midline, and the peritoneum was entered bluntly, and the opening was extended with digital pressure. The bladder blade was placed. A transverse hysterotomy was made with the scalpel in the lower uterine segment, and the incision was extended with digital pressure. The infant was noted to be in the ROP position, and was delivered atraumatically. The shoulders delivered easily.  No nuchal cord was noted. The cord was doubly clamped and cut, and the infant was handed off to the awaiting NICU staff. A segment of cord was cut and held. The placenta was delivered with gentle traction on the umbilical cord and uterine massage. The uterus was exteriorized and cleared of all clots and debris. Uterine tone was noted to be boggy with high dose pitocin given through the running IV and uterine massage, therefore a dose of IM methergine was given. The uterus was then noted to be firm. The hysterotomy was closed with a running locked suture of 0 Vicryl. A left uterine extension was noted, but not extending into the cervix or the broad ligament, and was repaired separately  with a running locked suture of 0 Vicryl. The hysterotomy was then imbricated using an 0 Monocryl suture. The hysterotomy was noted to be hemostatic. The posterior cul-de-sac was cleared of all clots and debris. The uterus was returned to the abdomen. The pericolic gutters were cleared of all clots and debris. The hysterotomy was reexamined and noted to be hemostatic. The fascia and rectus muscles were examined and areas of oozing were controlled with electrocautery. The fascia was closed with a running 0 Vicryl suture. The subcutaneous tissue was irrigated and areas of oozing were controlled with electrocautery. The subcutaneous tissue was greater than 2 cm in thickness, and was therefore closed with running suture of 3-0 plain gut. The skin was closed with 4-0 Monocryl and covered with a sterile dressing.    All sponge, needle, and instrument counts were correct. The patient tolerated the procedure well, and was transferred to recovery in stable condition. Dr. Anthony was present and scrubbed for the entirety of the procedure.     Jemima Oreilly MD  OB/GYN Resident, PGY-2  12/13/2019 1:40 PM      Staff:  I was scrubbed and present for entire case and agree w/ above note.    Tyesha Anthony MD

## 2019-12-13 NOTE — PROGRESS NOTES
VSS. Category I tracing after Pit stopped at 0845. Occasional lates. Cervix rechecked at 1050, no change in cervix 7/70/-1, swollen cervix. Plan is for c/s. Prepped for c/s. Transferred to OR2 via bed.

## 2019-12-13 NOTE — ANESTHESIA PROCEDURE NOTES
Spinal/LP Procedure Note    Spinal Block  Staff:     Anesthesiologist:  Thu Moses MD  Location: OR  Procedure Start/Stop Times:     patient identified, IV checked, site marked, risks and benefits discussed, informed consent, monitors and equipment checked, pre-op evaluation, at physician/surgeon's request and post-op pain management      Correct Patient: Yes      Correct Position: Yes      Correct Site: Yes      Correct Procedure: Yes      Correct Laterality:  N/A    Site Marked:  N/A  Procedure:     Procedure:  Intrathecal    ASA:  2 and Emergent    Position:  Sitting    Sterile Prep: chloraprep, alcohol, mask, sterile gloves and patient draped      Insertion site:  L2-3 and L3-4    Approach:  Midline    Needle Type:  Genny    Needle gauge (G):  25    Local Skin Infiltration:  1% lidocaine    amount (ml):  4    Needle Length (in):  3.5    Introducer used: Yes      Introducer gauge:  20 G    Attempts:  3    Redirects:  3    CSF:  Clear    Paresthesias:  No  Assessment/Narrative:      The patient with a previous difficult epidural placement, catheter working suboptimally. We removed the epidural catheter and attempted the spinal placement at 2 different levels, due to the patient scoliosis and difficult positioning in setting of the patient's labor pain and obesity. I attempted combined spinal epidural, found the epidural space, however there was no CSF during the spinal needle insertion. Dr. Bay Quinteros, used US to place the spinal, obtained good CSF, however the spinal block did not work.     We decided to do general anesthesia.

## 2019-12-13 NOTE — PLAN OF CARE
VSS. Pt comfortable with epidural. Uterine tachysystole resolving. Merida bulb remains in. Anticipate .

## 2019-12-13 NOTE — ANESTHESIA POSTPROCEDURE EVALUATION
Anesthesia POST Procedure Evaluation    Patient: Ana Cleveland   MRN:     [id number] Gender:   female   Age:    39 year old :      1980        Preoperative Diagnosis: * No pre-op diagnosis entered *   * No procedures listed *   Postop Comments: No value filed.       Anesthesia Type:  Not documented  No value filed.    Reportable Event: NO     PAIN: Uncomplicated   Sign Out status: Comfortable, Well controlled pain     PONV: No PONV   Sign Out status:  No Nausea or Vomiting     Neuro/Psych: Uneventful perioperative course   Sign Out Status: Preoperative baseline; Age appropriate mentation     Airway/Resp.: Uneventful perioperative course   Sign Out Status: Non labored breathing, age appropriate RR; Resp. Status within EXPECTED Parameters     CV: Uneventful perioperative course   Sign Out status: Appropriate BP and perfusion indices; Appropriate HR/Rhythm     Disposition:   Sign Out in:  PACU  Disposition:  Phase II; Home  Recovery Course: Uneventful  Follow-Up: Not required           Last Anesthesia Record Vitals:      Last PACU Vitals:  Vitals Value Taken Time   /65 2019  6:50 PM   Temp     Pulse     Resp     SpO2 100 % 2019  6:56 PM   Temp src     NIBP     Pulse     SpO2     Resp     Temp     Ht Rate     Temp 2     Vitals shown include unvalidated device data.      Electronically Signed By: Beti Lechuga MD, 2019, 7:01 PM

## 2019-12-13 NOTE — PLAN OF CARE
VSS.  AFEBRILE.  ASSESSMENT WNL'S.  PT. COMFORTABLE WITH LABOR EPIDURAL.  PITOCIN INFUSING, PER PROTOCOL.  SVE 4/40/-3.  MEMBRANES INTACT, DR. REYES, PLANNING AROM,  THIS AM.  FHT'S 140, WITH MODERATE VARIABILITY, + ACCELS, ET OCCASIONAL DECELS.  CONTINUE WITH PLAN OF CARE.

## 2019-12-13 NOTE — PLAN OF CARE
Data: Ana Cleveland transferred to [id number] via cart at 1615. Baby transferred via parent's arms.  Action: Receiving unit notified of transfer: Yes. Patient and family notified of room change. Report given to SARITA Gilmore at 1615. Belongings sent to receiving unit. Accompanied by Registered Nurse. Oriented patient to surroundings. Call light within reach. ID bands double-checked with receiving RN.  Response: Patient tolerated transfer and is stable.

## 2019-12-13 NOTE — PROGRESS NOTES
FHT Strip Review Note    Baseline 140, minimal variability, no accels, intermittent early and late decelerations  [place]: Contractions q2 minutes    Per RN check, SVE 7/70/-1. IV pitocin turned off. Fluid bolus given, position changes attempted. Will give maternal supplemental O2. Will continue to monitor closely.     Jemima Oreilly MD  Obstetrics and Gynecology, PGY2  12/13/2019 8:37 AM

## 2019-12-13 NOTE — PROGRESS NOTES
Labor Progress Note    S:  Patient sleeping, comfortable.     O:   Patient Vitals for the past 4 hrs:   BP Temp Temp src Resp SpO2   19 0300 109/61 98.8  F (37.1  C) Oral 20 97 %   19 0144 102/56 -- -- 16 97 %   19 0026 103/62 -- -- 16 97 %   19 0000 95/57 -- -- 16 96 %     SVE: /-2, AROM performed    FHT: Baseline 150, moderate variability, + accelerations, no decelerations  [place]: 4-5 contractions in 10 minutes    A/P:  Ms. Ana Cleveland is a 39 year old  at 41w2d, admitted for IOL for late term gestation.    # IOL  - s/p PV miso x2. S/p latham. PCN >4 hours. On pitocin. /-2, s/p AROM. Continue to titrate pitocin  - comfortable with epidural    # FWB:   - category I, reactive  - intrauterine resuscitation PRN    Luis Enrique Arroyo MD  Obstetrics and Gyncology, PGY-2

## 2019-12-13 NOTE — ANESTHESIA PROCEDURE NOTES
Epidural Procedure Note    Staff:     Anesthesiologist:  Beti Wong MD    Resident/CRNA:  Riki Gomez MD    Procedure performed by resident/CRNA in the presence of a teaching physician    Location: OB and floor   Pre-procedure checklist:   patient identified, IV checked, site marked, risks and benefits discussed, informed consent, monitors and equipment checked, pre-op evaluation, at physician/surgeon's request and post-op pain management      Correct Patient: Yes      Correct Position: Yes      Correct Site: Yes      Correct Procedure: Yes      Correct Laterality:  Yes    Site Marked:  Yes  Procedure:     Procedure:  Epidural catheter    ASA:  2    Position:  Sitting    Sterile Prep: chloraprep, mask, sterile gloves and patient draped      Insertion site:  L3-4    Local skin infiltration:  1% lidocaine    amount (mL):  3    Approach:  Right paramedian    Needle gauge (G):  17    Needle Length (in):  3.5    Block Needle Type:  Touhy    Injection Technique:  LORT saline and LORT air    CAL at (cm):  7    Attempts:  3    Redirects:  2    Catheter gauge (G):  18    Catheter threaded easily: Yes      Threaded to cm at skin:  11    Threaded in epidural space (cm):  4    Paresthesias:  No    Aspiration negative for Heme or CSF: Yes       Local anesthetic:  Lidocaine 1.5% w/ 1:200,000 epinephrine    Epidural test dose: see record.    Test dose negative for signs of intravascular, subdural or intrathecal injection: Yes    Assessment/Narrative:      Difficult placement; 3 attempts midline, ultimately successful with R paramedian approach.

## 2019-12-13 NOTE — ANESTHESIA PROCEDURE NOTES
Peripheral Nerve Block Procedure Note    Staff:     Anesthesiologist:  Thu Moses MD    Resident/CRNA:  Mike Garcia DO    Block performed by resident/CRNA in the presence of a teaching physician    Location: PACU  Procedure Start/Stop TImes:      12/13/2019 3:20 PM     12/13/2019 3:30 PM    patient identified, IV checked, site marked, risks and benefits discussed, informed consent, monitors and equipment checked, pre-op evaluation, at physician/surgeon's request and post-op pain management      Correct Patient: Yes      Correct Position: Yes      Correct Site: Yes      Correct Procedure: Yes      Correct Laterality:  Yes    Site Marked:  Yes  Procedure details:     Procedure:  TAP    ASA:  2    Laterality:  Bilateral    Position:  Supine    Sterile Prep: chloraprep, mask and sterile gloves      Needle:  Short bevel    Needle gauge:  21    Needle length (inches):  3.1    Ultrasound: Yes      Ultrasound used to identify targeted nerve, plexus, or vascular structure and placed a needle adjacent to it      Permanent Image entered into patiient's record      Abnormal pain on injection: No      Blood Aspirated: No      Paresthesias:  No    Bleeding at site: No      Bolus via:  Needle    Infusion Method:  Single Shot    Complications:  None  Assessment/Narrative:     Injection made incrementally with aspirations every (mL):  5     Bilateral classic TAP block

## 2019-12-13 NOTE — ANESTHESIA CARE TRANSFER NOTE
Patient: Ana Cleveland    Procedure(s):   SECTION    Diagnosis: * No pre-op diagnosis entered *  Diagnosis Additional Information: No value filed.    Anesthesia Type:   No value filed.     Note:  Airway :Room Air  Patient transferred to:Labor and Delivery  Handoff Report: Identifed the Patient, Identified the Reponsible Provider, Reviewed the pertinent medical history, Discussed the surgical course, Reviewed Intra-OP anesthesia mangement and issues during anesthesia, Set expectations for post-procedure period and Allowed opportunity for questions and acknowledgement of understanding      Vitals: (Last set prior to Anesthesia Care Transfer)    CRNA VITALS  2019 1300 - 2019 1348      2019             Resp Rate (observed):  (!) 1                Electronically Signed By: ASHLEY Giron CRNA  2019  1:48 PM

## 2019-12-13 NOTE — ANESTHESIA POSTPROCEDURE EVALUATION
Anesthesia POST Procedure Evaluation    Patient: Ana Cleveland   MRN:     [id number] Gender:   female   Age:    39 year old :      1980        Preoperative Diagnosis: * No pre-op diagnosis entered *   Procedure(s):   SECTION   Postop Comments: No value filed.       Anesthesia Type:  Not documented  General, Spinal, Peripheral Nerve Block    Reportable Event: NO     PAIN: Uncomplicated   Sign Out status: Comfortable, Well controlled pain     PONV: No PONV   Sign Out status:  No Nausea or Vomiting     Neuro/Psych: Uneventful perioperative course   Sign Out Status: Preoperative baseline; Age appropriate mentation     Airway/Resp.: Uneventful perioperative course   Sign Out Status: Non labored breathing, age appropriate RR; Resp. Status within EXPECTED Parameters     CV: Uneventful perioperative course   Sign Out status: Appropriate BP and perfusion indices; Appropriate HR/Rhythm     Disposition:   Sign Out in:  PACU  Disposition:  Phase II; Home  Recovery Course: Uneventful  Follow-Up: Not required           Last Anesthesia Record Vitals:  CRNA VITALS  2019 1300 - 2019 1400      2019             Resp Rate (observed):  (!) 1          Last PACU Vitals:  Vitals Value Taken Time   BP 96/69 2019  3:40 PM   Temp 37.8  C (100  F) 2019  2:10 PM   Pulse 82 2019  3:40 PM   Resp 20 2019  3:41 PM   SpO2 99 % 2019  3:41 PM   Temp src     NIBP     Pulse     SpO2     Resp     Temp     Ht Rate     Temp 2     Vitals shown include unvalidated device data.      Electronically Signed By: Thu Moses MD, 2019, 3:42 PM

## 2019-12-13 NOTE — PROGRESS NOTES
IOL for late term.  Has had intermittent cat 2 tracing. Pit off x 2 hrs. Strip still intermittent cat 2.     O: /72   Temp 98.3  F (36.8  C)   Resp 16   LMP 2019 (Approximate)   SpO2 97%   Cervix unchanged    A/p: 40 yo  at 41+2 wks, IOL for late term gest, cat 2 tracing w/o cervix change.     1. Plan to proceed with c/s - anesthesia notified at 1050 AM    2. Labs utd    3. Consent obtained    Tyesha Anthony MD, FACOG  [facility] Staff  OB/GYN    2019  11:03 AM

## 2019-12-13 NOTE — OR NURSING
Data: Pt to OB PACU at 1337 via cart. PIV infusing without complications, latham with clear, yellow urine to gravity, pt denies any pain, denies any nausea/vomiting.  Interventions: IV to pump, monitors and alarms on, SCD on.  Response: stable.  Plan: Patient instructed to notify RN for pain or nausea, routine post op cares, initiate breastfeeding/pumping as soon as patient/infant able.

## 2019-12-13 NOTE — DISCHARGE SUMMARY
[facility] Discharge Summary    Ana Cleveland MRN# [id number]   Age: 39 year old YOB: 1980     Date of Admission:  2019  Date of Discharge::  19  Admitting Physician:  Tyesha Anthony MD  Discharge Physician:  Zeny Gustafson MD              Admission Diagnoses:   Intrauterine pregnancy at 41w2d  Late term gestation  Obesity   AMA          Discharge Diagnosis:     Same, delivered   Category II FHT  Bandl's ring  Postpartum hemorrhage          Procedures:     Procedure(s):  Primary low-transverse  section with two layer uterine closure via Pfannenstiel incision  Epidural anesthesia, failed spinal anesthesia, GETA  TAP block                 Medications Prior to Admission:     Medications Prior to Admission   Medication Sig Dispense Refill Last Dose     Cholecalciferol 4000 units CAPS    2019 at Unknown time     Prenatal Vit-Fe Fumarate-FA (PRENATAL MULTIVITAMIN PLUS IRON) 27-0.8 MG TABS per tablet Take 1 tablet by mouth daily 100 tablet 3 2019 at Unknown time             Discharge Medications:        Review of your medicines      START taking      Dose / Directions   acetaminophen 325 MG tablet  Commonly known as:  TYLENOL      Dose:  650 mg  Take 2 tablets (650 mg) by mouth every 4 hours as needed for mild pain  Quantity:  60 tablet  Refills:  0     ferrous sulfate 325 (65 Fe) MG tablet  Commonly known as:  FEROSUL      Dose:  325 mg  Take 1 tablet (325 mg) by mouth daily (with breakfast)  Quantity:  60 tablet  Refills:  0     HYDROmorphone 2 MG tablet  Commonly known as:  DILAUDID      Dose:  2 mg  Take 1 tablet (2 mg) by mouth every 6 hours as needed for pain  Quantity:  10 tablet  Refills:  0     ibuprofen 800 MG tablet  Commonly known as:  ADVIL/MOTRIN      Dose:  800 mg  Take 1 tablet (800 mg) by mouth every 6 hours as needed for other (cramping)  Quantity:  30 tablet  Refills:  0     senna-docusate 8.6-50 MG tablet  Commonly known as:   SENOKOT-S/PERICOLACE      Dose:  1 tablet  Take 1 tablet by mouth 2 times daily as needed for constipation  Quantity:  40 tablet  Refills:  0        CONTINUE these medicines which have NOT CHANGED      Dose / Directions   Cholecalciferol 100 MCG (4000 UT) Caps      Refills:  0     prenatal multivitamin w/iron 27-0.8 MG tablet  Used for:  Visit for supervision of normal first pregnancy       Dose:  1 tablet  Take 1 tablet by mouth daily  Quantity:  100 tablet  Refills:  3           Where to get your medicines      These medications were sent to [facility] [place], [place] - [address]  [address] [facility] [address] [address]    Phone:  [phone]     acetaminophen 325 MG tablet    ferrous sulfate 325 (65 Fe) MG tablet    ibuprofen 800 MG tablet    senna-docusate 8.6-50 MG tablet     Some of these will need a paper prescription and others can be bought over the counter. Ask your nurse if you have questions.    Bring a paper prescription for each of these medications    HYDROmorphone 2 MG tablet               Consultations:   Anesthesia          Brief Admission History:   Ms. Ana Cleveland is a 39 year old  at 41w1d by LMP c/w 8w2d by US here for IOL for late term gestation. She denies contractions, vaginal bleeding, or loss of fluid. She states that she has generally been feeling well for the past several weeks. Endorses significant fetal movement. Denies headache, chest pain, blurry vision, and abdominal pain. Ana says that she is very ready to be done being pregnant. She has family history of macrosomia and says that both her mom and sister delivered their babies by ; she is nervous about baby being too big for vaginal delivery. Reassured that there is currently no indication for . Understands that after induction, labor will likely take place over the course of days, not hours.    Intraoperative course   The procedure was complicated by postpartum hemorrhage.   EBL 1585 mL.  See operative report for details.     Findings:   1. No fascial or intraabdominal adhesions  2. Clear amniotic fluid  3. Liveborn female infant in ROP presentation. Apgars 8 at 1 minute & 9 at 5 minutes. Weight 4070g.  4. Arterial cord pH 7.34, base deficit 3.1. Venous cord pH 7.32, base deficit 3.7.  5. Bandl's ring present in ANISH. Extension of hysterotomy on left side, not into cervix or broad ligament. Otherwise normal uterus, fallopian tubes, and ovaries.      Postpartum Course   The patient's hospital course was notable for acute blood loss anemia. She received 1u pRBCs with appropriate rise of 8.2.  She recovered as anticipated and experienced no post-operative complications. On discharge, her pain was well controlled. Vaginal bleeding is similar to peak menstrual flow.  Voiding without difficulty.  Ambulating well and tolerating a normal diet.  No fever or significant wound drainage.  Breastfeeding well.  Infant is stable.  She was discharged on post-partum day #3.    Post-partum hemoglobin:   Hemoglobin   Date Value Ref Range Status   12/16/2019 7.5 (L) 11.7 - 15.7 g/dL Final             Discharge Instructions and Follow-Up:     Discharge diet: Regular   Discharge activity: No lifting greater than 20 lbs, pushing, pulling, or other strenuous activity for 6 weeks. Pelvic rest for 6 weeks including no sexual intercourse, tampons, or douching. No driving until you can slam on the breaks without pain or while on narcotic pain medications.    Discharge follow-up: Follow up with primary OB for routine postpartum visit in 6 weeks   Wound care: Keep incision clean and dry           Discharge Disposition:     Discharged to home        Jemima Oreilly MD  Obstetrics and Gynecology, PGY2  12/16/2019 8:00 AM       The patient was seen and examined by me on the day of discharge.  I have reviewed and agree with the above note.    Zeny Gustafson MD, FACOG

## 2019-12-13 NOTE — PROGRESS NOTES
Labor Progress Note    S:  Patient feeling more uncomfortable, just pushed epidural button. Latham out.    O:   Patient Vitals for the past 4 hrs:   BP Temp Temp src Resp SpO2   190 114/64 -- -- 16 97 %   19 116/64 -- -- 20 96 %   19 112/65 -- -- 16 98 %   19 192 115/62 98.6  F (37  C) Axillary 20 --     SVE: /-3, ballotable    FHT: Baseline 150, moderate variability, + accelerations, no decelerations  [place]: 4-5 contractions in 10 minutes    A/P:  Ms. Ana Cleveland is a 39 year old  at 41w1d, admitted for IOL for late term gestation.    # IOL  - s/p PV miso x2. S/p latham. Now 40/-3. Starting PCN now for GBS prophylaxis. Will start pitocin if contractions space.  - comfortable with epidural    # FWB:   - category I, reactive  - intrauterine resuscitation PRN    Luis Enrique Arroyo MD  Obstetrics and Gyncology, PGY-2  2019 11:23 PM

## 2019-12-13 NOTE — PROVIDER NOTIFICATION
12/12/19 1906   Provider Notification   Provider Name/Title DR Pruitt   Method of Notification In Department   Notification Reason Uterine Activity   Strip reviewed due to continued periods of uterine tachysystole. Bladder emptied. Pt repositioned to right side and contractions appear to spacing since repositioning. Pt comfortable with epidural. FHTs overall with moderate variability, occasional acel, no decels. Plan for terbutaline if uterine tachsystole persists.

## 2019-12-14 LAB — HGB BLD-MCNC: 8.4 G/DL (ref 11.7–15.7)

## 2019-12-14 PROCEDURE — 36415 COLL VENOUS BLD VENIPUNCTURE: CPT | Performed by: STUDENT IN AN ORGANIZED HEALTH CARE EDUCATION/TRAINING PROGRAM

## 2019-12-14 PROCEDURE — 12000001 ZZH R&B MED SURG/OB UMMC

## 2019-12-14 PROCEDURE — 85018 HEMOGLOBIN: CPT | Performed by: STUDENT IN AN ORGANIZED HEALTH CARE EDUCATION/TRAINING PROGRAM

## 2019-12-14 PROCEDURE — 25000132 ZZH RX MED GY IP 250 OP 250 PS 637: Performed by: STUDENT IN AN ORGANIZED HEALTH CARE EDUCATION/TRAINING PROGRAM

## 2019-12-14 PROCEDURE — 25000128 H RX IP 250 OP 636: Performed by: STUDENT IN AN ORGANIZED HEALTH CARE EDUCATION/TRAINING PROGRAM

## 2019-12-14 RX ORDER — LIDOCAINE 4 G/G
1 PATCH TOPICAL
Status: DISCONTINUED | OUTPATIENT
Start: 2019-12-14 | End: 2019-12-16 | Stop reason: HOSPADM

## 2019-12-14 RX ADMIN — OXYCODONE HYDROCHLORIDE 10 MG: 5 TABLET ORAL at 08:02

## 2019-12-14 RX ADMIN — KETOROLAC TROMETHAMINE 30 MG: 30 INJECTION, SOLUTION INTRAMUSCULAR at 13:54

## 2019-12-14 RX ADMIN — ACETAMINOPHEN 975 MG: 325 TABLET, FILM COATED ORAL at 02:01

## 2019-12-14 RX ADMIN — SENNOSIDES AND DOCUSATE SODIUM 2 TABLET: 8.6; 5 TABLET ORAL at 08:02

## 2019-12-14 RX ADMIN — OXYCODONE HYDROCHLORIDE 5 MG: 5 TABLET ORAL at 17:13

## 2019-12-14 RX ADMIN — OXYCODONE HYDROCHLORIDE 10 MG: 5 TABLET ORAL at 05:05

## 2019-12-14 RX ADMIN — IBUPROFEN 800 MG: 800 TABLET, FILM COATED ORAL at 19:55

## 2019-12-14 RX ADMIN — OXYCODONE HYDROCHLORIDE 10 MG: 5 TABLET ORAL at 11:40

## 2019-12-14 RX ADMIN — OXYCODONE HYDROCHLORIDE 5 MG: 5 TABLET ORAL at 15:47

## 2019-12-14 RX ADMIN — ACETAMINOPHEN 975 MG: 325 TABLET, FILM COATED ORAL at 11:41

## 2019-12-14 RX ADMIN — ACETAMINOPHEN 975 MG: 325 TABLET, FILM COATED ORAL at 19:55

## 2019-12-14 RX ADMIN — KETOROLAC TROMETHAMINE 30 MG: 30 INJECTION, SOLUTION INTRAMUSCULAR at 02:03

## 2019-12-14 RX ADMIN — KETOROLAC TROMETHAMINE 30 MG: 30 INJECTION, SOLUTION INTRAMUSCULAR at 08:03

## 2019-12-14 RX ADMIN — OXYCODONE HYDROCHLORIDE 10 MG: 5 TABLET ORAL at 02:02

## 2019-12-14 NOTE — PLAN OF CARE
Patient vital signs stable. Postpartum assessment within normal limits. Small serosanguinous drainage present over incision site, previously marked. No changes in size. Patient complaining of sore nipples. Encouraged to use lanolin between feedings. Breastfeeding with assistance. Patient shown tummy-to-tummy/football positions and encouraged hand expression/finger feed colostrum. Pain managed with tylenol, toradol and oxycodone. Patient eating and drinking normally. Merida removed @ 0500. Patient ambulated to bathroom. Denies dizziness. Patient performing self cares and is caring for infant. Continue with plan of care

## 2019-12-14 NOTE — PLAN OF CARE
VSS. Postpartum assessment WNL. C/o incisional pain-taking tylenol, toradol, and oxycodone for pain control. Merida catheter draining good output. Pt stood at edge of bed but declined ambulating at this time. EDS of 0. Breastfeeding with assistance. Encouraged pt to watch educational videos and complete birth certificate. Pt states she has a breast pump already at home. Spouse present and attentive.

## 2019-12-14 NOTE — PROGRESS NOTES
[facility]   Post-partum Progress Note    Name:  Ana Cleveland  MRN: [id number]    S: Feeling ok but having abdominal pain, moderately controlled with PO meds. Tolerating regular diet without n/v.  Ambulating, but feeling some dizziness. Merida out this morning, not yet voiding. Passing flatus, no bowel movement. Lochia light.  Bresatfeeding. Unsure for contraception.    O:   Patient Vitals for the past 12 hrs:   BP Temp Temp src Pulse Heart Rate Resp SpO2   19 0841 103/59 -- -- 87 -- 16 100 %   19 0500 100/68 98.8  F (37.1  C) Oral -- 91 18 100 %   19 0100 100/56 98.9  F (37.2  C) Oral -- 90 18 100 %     Gen:  Resting comfortably, NAD  CV:  Regular rate and rhythm   Pulm:  Non-labored breathing. No cough or wheezing.   Abd:  Soft, appropriately tender to palpation, non-distended.  Fundus at umbilicus, firm and non-tender.  Incision: c/d/i with small spot of dried blood, no surrounding erythema or edema  Ext:  Non-tender, trace LE edema b/l    Recent Labs   Lab 19  0648 19  1818 19  0906   HGB 8.4* 8.8* 11.2*     Assessment/Plan:  39 year old  on POD #1 s/p PLTCS for Cat II FHT with PPH of 1585cc. Meeting postpartum milestones. Continue with routine postpartum management.     Pain: Moderately-controlled with sched Tylenol, Ketorolac, PRN oxy. Will order Lidocaine patch  Hgb: 11.2 >EBL 1585 (meth, miso)> 8.4. VSS as noted above, dizzy but otherwise asymptomatic. Will discharge with PO iron  GI:  BID Senna/Colace.  PRN Simethicone.   PPx:  Encouraged ambulation. Will wait on starting PPX Lovenox given Hgb drop and dizziness  Rh: Positive  Rubella: Immune  Feed: Breast feeding  BC: Undecided  Dispo: Plan for home on POD#2-3.    Patient seen and discussed with Dr. Garcia.    Juan Garcias MD MPH  OB/Gyn PGY-1  19 10:22 AM    OBGYN Attending Addendum     Ms. Ana Cleveland was seen and examined by me separately from the team.  I have reviewed and agree with  the above note by Dr. Garcias.    I personally reviewed the following: vitals, meds, labs.     Ana is POD#1 after PLTCS c/b PPH. Her vitals are appropriate this AM and exam is not concerning for ongoing bleeding or surgical complication. Agree with plan for lidocaine patch to improve pain control. Given low Hgb, will defer lovenox for DVT prophylaxis for now; continue ambulation and SCDs while resting.    Anticipate discharge POD#3.    Carley Garcia MD, MSCI  Date of Service: 12/14/2019

## 2019-12-14 NOTE — PLAN OF CARE
Patient arrived to [facility] unit via cart around 1615,with belongings, accompanied by GREGORY RN and family, with infant in arms. Received report from GREGORY RN and checked bands. Unit and room orientation given. Call light within reach; No concerns present at this time. Continue with plan of care.

## 2019-12-15 LAB
ABO + RH BLD: NORMAL
ABO + RH BLD: NORMAL
BLD GP AB SCN SERPL QL: NORMAL
BLD PROD TYP BPU: NORMAL
BLD PROD TYP BPU: NORMAL
BLD UNIT ID BPU: 0
BLOOD BANK CMNT PATIENT-IMP: NORMAL
BLOOD PRODUCT CODE: NORMAL
BPU ID: NORMAL
HGB BLD-MCNC: 6.6 G/DL (ref 11.7–15.7)
HGB BLD-MCNC: 8.2 G/DL (ref 11.7–15.7)
NUM BPU REQUESTED: 2
SPECIMEN EXP DATE BLD: NORMAL
TRANSFUSION STATUS PATIENT QL: NORMAL
TRANSFUSION STATUS PATIENT QL: NORMAL

## 2019-12-15 PROCEDURE — 25800030 ZZH RX IP 258 OP 636

## 2019-12-15 PROCEDURE — 25000132 ZZH RX MED GY IP 250 OP 250 PS 637: Performed by: STUDENT IN AN ORGANIZED HEALTH CARE EDUCATION/TRAINING PROGRAM

## 2019-12-15 PROCEDURE — 85018 HEMOGLOBIN: CPT | Performed by: STUDENT IN AN ORGANIZED HEALTH CARE EDUCATION/TRAINING PROGRAM

## 2019-12-15 PROCEDURE — P9016 RBC LEUKOCYTES REDUCED: HCPCS | Performed by: STUDENT IN AN ORGANIZED HEALTH CARE EDUCATION/TRAINING PROGRAM

## 2019-12-15 PROCEDURE — 25000128 H RX IP 250 OP 636: Performed by: STUDENT IN AN ORGANIZED HEALTH CARE EDUCATION/TRAINING PROGRAM

## 2019-12-15 PROCEDURE — 36415 COLL VENOUS BLD VENIPUNCTURE: CPT | Performed by: STUDENT IN AN ORGANIZED HEALTH CARE EDUCATION/TRAINING PROGRAM

## 2019-12-15 PROCEDURE — 12000001 ZZH R&B MED SURG/OB UMMC

## 2019-12-15 RX ORDER — AMOXICILLIN 250 MG
1 CAPSULE ORAL 2 TIMES DAILY PRN
Qty: 40 TABLET | Refills: 0 | Status: SHIPPED | OUTPATIENT
Start: 2019-12-15 | End: 2020-01-24

## 2019-12-15 RX ORDER — HYDROMORPHONE HYDROCHLORIDE 2 MG/1
2 TABLET ORAL
Status: DISCONTINUED | OUTPATIENT
Start: 2019-12-15 | End: 2019-12-16 | Stop reason: HOSPADM

## 2019-12-15 RX ORDER — IBUPROFEN 800 MG/1
800 TABLET, FILM COATED ORAL EVERY 6 HOURS PRN
Qty: 30 TABLET | Refills: 0 | Status: SHIPPED | OUTPATIENT
Start: 2019-12-15 | End: 2020-01-24

## 2019-12-15 RX ORDER — FERROUS SULFATE 325(65) MG
325 TABLET ORAL
Qty: 60 TABLET | Refills: 0 | Status: SHIPPED | OUTPATIENT
Start: 2019-12-15 | End: 2020-01-24

## 2019-12-15 RX ORDER — ACETAMINOPHEN 325 MG/1
650 TABLET ORAL EVERY 4 HOURS PRN
Qty: 60 TABLET | Refills: 0 | Status: SHIPPED | OUTPATIENT
Start: 2019-12-15 | End: 2020-01-24

## 2019-12-15 RX ORDER — HYDROMORPHONE HYDROCHLORIDE 2 MG/1
2 TABLET ORAL EVERY 6 HOURS PRN
Qty: 10 TABLET | Refills: 0 | Status: SHIPPED | OUTPATIENT
Start: 2019-12-15 | End: 2020-01-24

## 2019-12-15 RX ORDER — SODIUM CHLORIDE 9 MG/ML
INJECTION, SOLUTION INTRAVENOUS
Status: COMPLETED
Start: 2019-12-15 | End: 2019-12-15

## 2019-12-15 RX ADMIN — HYDROMORPHONE HYDROCHLORIDE 2 MG: 2 TABLET ORAL at 09:59

## 2019-12-15 RX ADMIN — ACETAMINOPHEN 975 MG: 325 TABLET, FILM COATED ORAL at 12:40

## 2019-12-15 RX ADMIN — IBUPROFEN 800 MG: 800 TABLET, FILM COATED ORAL at 09:12

## 2019-12-15 RX ADMIN — HYDROMORPHONE HYDROCHLORIDE 2 MG: 2 TABLET ORAL at 23:25

## 2019-12-15 RX ADMIN — ACETAMINOPHEN 975 MG: 325 TABLET, FILM COATED ORAL at 19:51

## 2019-12-15 RX ADMIN — SODIUM CHLORIDE: 9 INJECTION, SOLUTION INTRAVENOUS at 10:00

## 2019-12-15 RX ADMIN — IBUPROFEN 800 MG: 800 TABLET, FILM COATED ORAL at 03:30

## 2019-12-15 RX ADMIN — IBUPROFEN 800 MG: 800 TABLET, FILM COATED ORAL at 16:21

## 2019-12-15 RX ADMIN — HYDROMORPHONE HYDROCHLORIDE 2 MG: 2 TABLET ORAL at 13:23

## 2019-12-15 RX ADMIN — ACETAMINOPHEN 975 MG: 325 TABLET, FILM COATED ORAL at 03:30

## 2019-12-15 RX ADMIN — ENOXAPARIN SODIUM 40 MG: 40 INJECTION SUBCUTANEOUS at 19:52

## 2019-12-15 RX ADMIN — IBUPROFEN 800 MG: 800 TABLET, FILM COATED ORAL at 23:25

## 2019-12-15 RX ADMIN — HYDROMORPHONE HYDROCHLORIDE 2 MG: 2 TABLET ORAL at 16:21

## 2019-12-15 RX ADMIN — HYDROMORPHONE HYDROCHLORIDE 2 MG: 2 TABLET ORAL at 19:51

## 2019-12-15 NOTE — PLAN OF CARE
VSS. Pain well controlled. Adequate output. Breastfeeding on cue. Bonding with infant. Will continue with plan of care.

## 2019-12-15 NOTE — PROVIDER NOTIFICATION
12/15/19 1555   Provider Notification   Provider Name/Title Srini G3   Method of Notification Electronic Page   Notification Reason Status Update  (PRBC complete @ 1230, when would you like hemoglobin checked)

## 2019-12-15 NOTE — PROGRESS NOTES
[facility]   Post-partum Progress Note    Name:  Ana Cleveland  MRN: [id number]    S: Patient fatigued and had poor pain control overnight. Notes some nausea and possible dizziness associated with roxicodone. Tolerating regular diet without N/V. Ambulating short distances. Voiding spontaneously. No BM. Lochia similar to menses. Breastfeeding, baby difficult to latch unless crying.    O:   Patient Vitals for the past 12 hrs:   BP Temp Temp src Pulse Resp   12/15/19 0300 114/63 97.9  F (36.6  C) Oral 81 18     Vitals:    12/15/19 0300 12/15/19 0907 12/15/19 1005 12/15/19 1015   BP: 114/63 108/72 114/66 106/64   Pulse: 81 83 95 94   Resp: 18 16 18 18   Temp: 97.9  F (36.6  C) 98.2  F (36.8  C) 98.6  F (37  C) 98.5  F (36.9  C)   TempSrc: Oral Oral Oral Oral   SpO2:           Gen:  Resting comfortably, NAD  CV:  Regular rate and rhythm   Pulm:  Non-labored breathing. No cough or wheezing.   Abd/Incision: unable to evaluate. Will evaluate later in the morning.   Ext:  Non-tender, trace LE edema b/l    Recent Labs   Lab 12/15/19  0801 19  0648 19  1818 19  0906   HGB 6.6* 8.4* 8.8* 11.2*     Assessment/Plan:  39 year old  on POD #2 s/p PLTCS for Cat II FHT with PPH of 1585cc. Experiencing some pain control issues.  Continue with routine postpartum management.     Pain: Moderately-controlled with sched Tylenol, Ketorolac, PRN oxy. Will switch Roxicodone to PO dilaudid due to poor tolerance of Roxicodone.   Hgb: 11.2 >EBL 1585 (meth, miso)> 8.4. Continue to monitor for signs of anemia. Will discharge with PO iron  GI:  BID Senna/Colace.  PRN Simethicone.   PPx:  Encouraged ambulation.   Rh: Positive  Rubella: Immune  Feed: Breast feeding. Recommend lactation consult.   BC: Undecided  Dispo: Plan for home on POD#2-3.    Alicia Myhre, DO  Obstetrics and Gyncology, PGY-3  December 15, 2019 , 8:42 AM       [facility] staff:  Appreciate note by Dr. Myhre.  I have seen and  examined the patient without the resident. I have reviewed, edited, and agree with the note.      Pt is symptomatic re: anemia.  Plan to get pRBCs 1 unit now. Pt agreeable.  No sign of ongoing bleeding at this point.  Continue the rest routine cares.     Tyesha Anthony MD, FACOG  12/15/2019  10:37 AM

## 2019-12-15 NOTE — PLAN OF CARE
Patient doing well today. VS and full assessment WDL. Incision with steri strips and WDL. Patient up walking in room and hallway. Did get a little dizzy this evening while up and having BM. VS stable. Does have 1+ edema. Breastfeeding independently and on on cue with excellent latch.  at bedside and very supportive.

## 2019-12-16 VITALS
HEART RATE: 75 BPM | RESPIRATION RATE: 16 BRPM | TEMPERATURE: 98.2 F | DIASTOLIC BLOOD PRESSURE: 75 MMHG | OXYGEN SATURATION: 100 % | SYSTOLIC BLOOD PRESSURE: 117 MMHG

## 2019-12-16 LAB
BLD PROD TYP BPU: NORMAL
BLD UNIT ID BPU: 0
BLOOD PRODUCT CODE: NORMAL
BPU ID: NORMAL
CREAT SERPL-MCNC: 0.54 MG/DL (ref 0.52–1.04)
GFR SERPL CREATININE-BSD FRML MDRD: >90 ML/MIN/{1.73_M2}
HGB BLD-MCNC: 7.5 G/DL (ref 11.7–15.7)
TRANSFUSION STATUS PATIENT QL: NORMAL
TRANSFUSION STATUS PATIENT QL: NORMAL

## 2019-12-16 PROCEDURE — 25000132 ZZH RX MED GY IP 250 OP 250 PS 637: Performed by: STUDENT IN AN ORGANIZED HEALTH CARE EDUCATION/TRAINING PROGRAM

## 2019-12-16 PROCEDURE — 36415 COLL VENOUS BLD VENIPUNCTURE: CPT | Performed by: STUDENT IN AN ORGANIZED HEALTH CARE EDUCATION/TRAINING PROGRAM

## 2019-12-16 PROCEDURE — 85018 HEMOGLOBIN: CPT | Performed by: STUDENT IN AN ORGANIZED HEALTH CARE EDUCATION/TRAINING PROGRAM

## 2019-12-16 PROCEDURE — 82565 ASSAY OF CREATININE: CPT | Performed by: STUDENT IN AN ORGANIZED HEALTH CARE EDUCATION/TRAINING PROGRAM

## 2019-12-16 RX ADMIN — ACETAMINOPHEN 975 MG: 325 TABLET, FILM COATED ORAL at 12:43

## 2019-12-16 RX ADMIN — IBUPROFEN 800 MG: 800 TABLET, FILM COATED ORAL at 04:31

## 2019-12-16 RX ADMIN — HYDROMORPHONE HYDROCHLORIDE 2 MG: 2 TABLET ORAL at 12:46

## 2019-12-16 RX ADMIN — IBUPROFEN 800 MG: 800 TABLET, FILM COATED ORAL at 10:24

## 2019-12-16 RX ADMIN — HYDROMORPHONE HYDROCHLORIDE 2 MG: 2 TABLET ORAL at 04:52

## 2019-12-16 RX ADMIN — ACETAMINOPHEN 975 MG: 325 TABLET, FILM COATED ORAL at 04:31

## 2019-12-16 RX ADMIN — HYDROMORPHONE HYDROCHLORIDE 2 MG: 2 TABLET ORAL at 09:00

## 2019-12-16 RX ADMIN — HYDROMORPHONE HYDROCHLORIDE 2 MG: 2 TABLET ORAL at 16:12

## 2019-12-16 NOTE — PLAN OF CARE
"Pt given 1 unit PRBCs this a.m., tolerated well. Pt states she is \"feeling much better\" after transfusion. Was able to shower this afternoon. Pt requested IV be removed. IV removed, but told pt it may need to be restarted if further infusions/transfusions are needed. Pt now taking dilaudid instead of oxycodone. Pt's pain well controlled, denies nausea/dizziness. Pt up in room independently. Breastfeeding with some staff assist.  here & supportive. Bonding well with baby.  "

## 2019-12-16 NOTE — PROVIDER NOTIFICATION
12/16/19 0800   Provider Notification   Provider Name/Title [facility]   Method of Notification Electronic Page   Request Evaluate-Remote   Notification Reason   (Hgb this am is 7.5. Asymptomatic at this time.)

## 2019-12-16 NOTE — PLAN OF CARE
VSS. PP assessments wnl. Hgb at 8.2 after 1unit RBCs, asymptomatic. Pain well controlled. Adequate output. Breastfeeding on cue. Bonding with infant. Anticipates to discharge on 12/16/19.

## 2019-12-16 NOTE — LACTATION NOTE
This note was copied from a baby's chart.  Consult for: First time mom breastfeeding, infant @ 10% weight loss.     History:   delivery @ 41w2d, AGA infant @ 8# 15.6 oz. birthweight, 7.4% loss at 24 & -10.1% from birthweight @ 48 hours with low risk serum bilirubin.  Maternal history of left breast fibroadenoma, anemia, postpartum hemorrhage with 1691 mL QBL.     Breast exam of mom: Soft but dense breasts, mostly symmetrical with bruised and tender, everted nipples bilaterally. Ana reports areolar pigment changes & bilateral breast growth during pregnancy.     Oral exam of baby: WNL, normal arch, organized suck on finger and good tongue extension noted spontaneously.    Feeding assessment: Initially latched somewhat shallow, painful & nipple pinched when she came off. Re-latch deeper in laid back position mom reports much more comfortable, able to point out nutritive suck and swallows that both parents could see and hear.     Education provided: Discussed positioning & using pillows/blankets for support, anatomy of breast and infant mouth for feedings, ways to get and maintain deeper latch, breast compressions to enhance milk transfer, point out nutritive vs. non-nutritive suck and how to hear swallows, supply and demand, benefits of breast massage, hand expression & hands on pumping, how to tell if getting enough, what to expect in the coming days and preventing engorgement. Reviewed breastfeeding log with when and who to call if concerns and breastfeeding resource list adding in [email or web address]. Support during breastfeeding session and with dad during his first time finger feeding. Bedside RN will bring mom pump to practice prior to discharge. Encouraged watching Maximizing Milk video while pumping.    Plan: Breastfeed on cue with goal of 8 to 12 feedings in 24 hours, watch for early cues. No longer than 3 hours between feedings, limit to about 30 minutes & offer supplement afterwards. Encouraged hands on  pumping after feedings until milk is in and weight stabilized, to offer supplements and maximize mom's supply. They plan follow up with [facility], oriented to outpatient lactation support there & encouraged check in within a week of discharge for support with first time breastfeeding, mom with postpartum hemorrhage and fibroadenoma and infant with 10% loss at discharge.

## 2019-12-16 NOTE — PROGRESS NOTES
[facility]   Post-partum Progress Note    Name:  Ana Cleveland  MRN: [id number]    S: Patient states that she is feeling significantly better after blood transfusion yesterday. She is not feeling dizzy or fatigued. Her pain is being adequately controlled with pain medication. Tolerating regular diet. Able to ambulate and void spontaneously. Lochia similar to menses. Breastfeeding, would like to consult with lactation before discharge. Denies shortness of breath, headache, or chest pain.    O:   Patient Vitals for the past 12 hrs:   BP Temp Temp src Pulse Resp   12/15/19 2325 104/63 98.3  F (36.8  C) Oral 79 16     Vitals:    12/15/19 1208 12/15/19 1231 12/15/19 1623 12/15/19 2325   BP: 105/64 110/72 119/66 104/63   Pulse:    79   Resp: 18 16 16 16   Temp: 98.8  F (37.1  C) 98.1  F (36.7  C) 98  F (36.7  C) 98.3  F (36.8  C)   TempSrc: Oral Oral Oral Oral   SpO2:           Gen:  Resting comfortably, NAD  CV:  Regular rate, well perfused  Pulm:  Non-labored breathing. No cough or wheezing.   Abd/Incision: No surrounding erythema or warmth.  Ext:  Non-tender, 1+ LE edema b/l    Recent Labs   Lab 12/15/19  1845 12/15/19  0801 19  0648 19  1818 19  0906   HGB 8.2* 6.6* 8.4* 8.8* 11.2*     Assessment/Plan:  39 year old  on POD #3 s/p PLTCS for Cat II FHT with PPH of 1585cc. Pain is better controlled.  Continue with routine postpartum management.     Pain: Moderately-controlled with sched Tylenol, Ketorolac, PRN oxy. Will switch Roxicodone to PO dilaudid due to poor tolerance of Roxicodone.   Hgb: 11.2 >EBL 1585 (meth, miso)> 8.4>6.6>1 unit(s) pRBC>8.2. Continue to monitor for signs of anemia. Will discharge with PO iron  GI:  BID Senna/Colace.  PRN Simethicone.   PPx:  Encouraged ambulation.   Rh: Positive  Rubella: Immune  Feed: Breast feeding. Lactation consult before discharge.   BC: Condoms  Dispo: Plan for home morning of POD#3.    Susanne Sanchez, MS3    I was present with  the medical student who participated in the service and in the documentation of this note.  I have verified the history and personally performed the physical exam and medical decision making, and have verified the content of the note, which accurately reflect my assessment of the patient and the plan of care.    Jemima Oreilly MD  Obstetrics and Gynecology, PGY2  12/16/2019 6:59 AM    /75   Pulse 75   Temp 98.2  F (36.8  C) (Oral)   Resp 16   LMP 02/27/2019 (Approximate)   SpO2 100%   Breastfeeding Unknown   Hemoglobin   Date Value Ref Range Status   12/16/2019 7.5 (L) 11.7 - 15.7 g/dL Final     The patient was seen and examined by me separately from the team.  I have reviewed and agree with the above note.  She is a little dizzy today

## 2020-01-21 NOTE — PROGRESS NOTES
[facility]  Postpartum Visit    SUBJECTIVE  Nahum Cleveland is a 39 year old  who is 6 weeks after a PLTCS delivery (19).    Vaginal bleeding stopped after 4 weeks. She denies abdominal pain, bowel concerns, or urinary concerns. She is breast feeding and intermittently supplementing with formula, doesn't have enough milk to only breastfeed. No concerns about her mood.    OB History    Para Term  AB Living   2 1 1 0 1 1   SAB TAB Ectopic Multiple Live Births   1 0 0 0 1      # Outcome Date GA Lbr Mauro/2nd Weight Sex Delivery Anes PTL Lv   2 Term 19 41w2d  4.07 kg (8 lb 15.6 oz) F CS-LTranv EPI, IV REGIONAL, Nitrous N MATIAS      Complications: Fetal Intolerance      Name: CARINEFEMALE-NAHUM      Apgar1: 8  Apgar5: 9   1 SAB 18 12w5d    AB, MISSED         Birth Comments: D&C to be scheduled . Desires genetics       Past Medical History:   Diagnosis Date     Fibroadenoma of breast, left     bx  benign has clips in place      Psoriasis        Past Surgical History:   Procedure Laterality Date      SECTION N/A 2019    Procedure:  SECTION;  Surgeon: Tyesha Anthony MD;  Location: [facility] L+D     DENTAL SURGERY      [place] teeth     DILATION AND CURETTAGE SUCTION N/A 2018    Procedure: DILATION AND CURETTAGE SUCTION   (12 WEEKS);  Surgeon: Tyesha Anthony MD;  Location: [facility] OR     US BREAST BIOPSY 2016       Current Outpatient Medications   Medication     acetaminophen (TYLENOL) 325 MG tablet     Cholecalciferol 4000 units CAPS     ferrous sulfate (FEROSUL) 325 (65 Fe) MG tablet     ibuprofen (ADVIL/MOTRIN) 800 MG tablet     Prenatal Vit-Fe Fumarate-FA (PRENATAL MULTIVITAMIN PLUS IRON) 27-0.8 MG TABS per tablet     senna-docusate (SENOKOT-S/PERICOLACE) 8.6-50 MG tablet     No current facility-administered medications for this visit.         No Known Allergies    Family History   Problem Relation Age of Onset     Skin Cancer  Mother         basal, squamous cell      Skin Cancer Father         melanoma , squamous cell and basal cell      Skin Cancer Maternal Grandmother      Skin Cancer Maternal Grandfather      Skin Cancer Paternal Grandmother      Glaucoma Brother         dx  at birth  glaucoma  [place] syndrome        Neurofibromatosis Nephew         dx as a baby  brain tumors surgery age 3      Other - See Comments Niece 11        Graves Diesase       Social History     Socioeconomic History     Marital status:      Spouse name: Bertin      Number of children: 0     Years of education: 16     Highest education level: Not on file   Occupational History     Occupation:       Comment: [employer]    Social Needs     Financial resource strain: Not on file     Food insecurity:     Worry: Not on file     Inability: Not on file     Transportation needs:     Medical: Not on file     Non-medical: Not on file   Tobacco Use     Smoking status: Never Smoker     Smokeless tobacco: Never Used   Substance and Sexual Activity     Alcohol use: Not Currently     Comment: 1 drink/wk     Drug use: No     Sexual activity: Yes     Partners: Male     Birth control/protection: None   Lifestyle     Physical activity:     Days per week: Not on file     Minutes per session: Not on file     Stress: Not on file   Relationships     Social connections:     Talks on phone: Not on file     Gets together: Not on file     Attends [employer]ism service: Not on file     Active member of club or organization: Not on file     Attends meetings of clubs or organizations: Not on file     Relationship status: Not on file     Intimate partner violence:     Fear of current or ex partner: Not on file     Emotionally abused: Not on file     Physically abused: Not on file     Forced sexual activity: Not on file   Other Topics Concern     Not on file   Social History Narrative    Exercise 30min walking daily     Kettle bell weights     Caffeine        REVIEW OF  "SYSTEMS  A 10 point review of systems including Constitutional, Eyes, Respiratory, Cardiovascular, Gastroenterology, Genitourinary, Integumentary, Musculoskeletal, and Psychiatric, were all negative, except for pertinent positives noted in the above HPI.    OBJECTIVE  /71   Pulse 93   Ht 1.626 m (5' 4\")   Wt 82.3 kg (181 lb 6.4 oz)   LMP 2019 (Approximate)   BMI 31.14 kg/m      General: Alert, without distress  HEENT: normocephalic, without obvious abnormality  Breast exam: deferred as she is breastfeeding   Lungs: breathing comfortably in room air.    Abdomen: soft, non-tender, non-distended, normal bowel sounds. Well healed incision   Pelvic: normal external female genitalia; normal vagina without discharge; normal cervix without lesions/masses; small uterus, anteverted, mobile, nontender; adnexae nontender and without masses; normal anus/perineum   Extremities: normal    LABS:   Last Pap 3/31/2016 (in Care Everywhere): NIL, HPV negative    ASSESSMENT/PLAN  Ana Cleveland is a 39 year old , here for her 6 week postpartum visit. Doing well.     - Repeat pap smear for cervical cancer screening by 2021  - Continue prenatal vitamin while breastfeeding  - Contraception plan: Condoms; discussed recommended birth spacing     Return for an annual health exam in 1 year.    Daysi Pleitez, MS3     OBGYN Attending Addendum    I appreciate the note above by medical student, Daysi Pleitez.  I, Carley Garcia, was present with the medical student, who participated in the service and in the documentation of the note. I have verified the history and personally performed the physical exam and medical decision making. I have edited accordingly and agree with the assessment and plan of care as documented in the note.    Carley Garcia MD, MSCI    [facility]/OBGYN  "

## 2020-01-24 ENCOUNTER — OFFICE VISIT (OUTPATIENT)
Dept: OBGYN | Facility: CLINIC | Age: 40
End: 2020-01-24
Attending: OBSTETRICS & GYNECOLOGY
Payer: COMMERCIAL

## 2020-01-24 VITALS
SYSTOLIC BLOOD PRESSURE: 102 MMHG | DIASTOLIC BLOOD PRESSURE: 71 MMHG | HEART RATE: 93 BPM | BODY MASS INDEX: 30.97 KG/M2 | HEIGHT: 64 IN | WEIGHT: 181.4 LBS

## 2020-01-24 ASSESSMENT — ANXIETY QUESTIONNAIRES
7. FEELING AFRAID AS IF SOMETHING AWFUL MIGHT HAPPEN: NOT AT ALL
3. WORRYING TOO MUCH ABOUT DIFFERENT THINGS: NOT AT ALL
1. FEELING NERVOUS, ANXIOUS, OR ON EDGE: NOT AT ALL
2. NOT BEING ABLE TO STOP OR CONTROL WORRYING: NOT AT ALL
6. BECOMING EASILY ANNOYED OR IRRITABLE: NOT AT ALL
GAD7 TOTAL SCORE: 0
5. BEING SO RESTLESS THAT IT IS HARD TO SIT STILL: NOT AT ALL

## 2020-01-24 ASSESSMENT — PATIENT HEALTH QUESTIONNAIRE - PHQ9
5. POOR APPETITE OR OVEREATING: NOT AT ALL
SUM OF ALL RESPONSES TO PHQ QUESTIONS 1-9: 0

## 2020-01-24 ASSESSMENT — MIFFLIN-ST. JEOR: SCORE: 1492.83

## 2020-01-24 NOTE — LETTER
2020    RE: Nahum Cleveland  [address]  [facility] [address]     Dear Colleague,    Thank you for referring your patient, Nahum Cleveland, to the [facility] at [facility]. Please see a copy of my visit note below.    [facility]  Postpartum Visit    SUBJECTIVE  Nahum Cleveland is a 39 year old  who is 6 weeks after a PLTCS delivery (19).    Vaginal bleeding stopped after 4 weeks. She denies abdominal pain, bowel concerns, or urinary concerns. She is breast feeding and intermittently supplementing with formula, doesn't have enough milk to only breastfeed. No concerns about her mood.    OB History    Para Term  AB Living   2 1 1 0 1 1   SAB TAB Ectopic Multiple Live Births   1 0 0 0 1      # Outcome Date GA Lbr Mauro/2nd Weight Sex Delivery Anes PTL Lv   2 Term 19 41w2d  4.07 kg (8 lb 15.6 oz) F CS-LTranv EPI, IV REGIONAL, Nitrous N MATIAS      Complications: Fetal Intolerance      Name: CARINE,FEMALE-NAHUM      Apgar1: 8  Apgar5: 9   1 SAB 18 12w5d    AB, MISSED         Birth Comments: D&C to be scheduled . Desires genetics       Past Medical History:   Diagnosis Date     Fibroadenoma of breast, left     bx  benign has clips in place      Psoriasis        Past Surgical History:   Procedure Laterality Date      SECTION N/A 2019    Procedure:  SECTION;  Surgeon: Tyesha Anthony MD;  [facility]     DENTAL SURGERY      [place] teeth     DILATION AND CURETTAGE SUCTION N/A 2018    Procedure: DILATION AND CURETTAGE SUCTION   (12 WEEKS);  Surgeon: Tyesha Anthony MD;  [facility] OR     US BREAST BIOPSY LT         Current Outpatient Medications   Medication     acetaminophen (TYLENOL) 325 MG tablet     Cholecalciferol 4000 units CAPS     ferrous sulfate (FEROSUL) 325 (65 Fe) MG tablet     ibuprofen (ADVIL/MOTRIN) 800 MG tablet     Prenatal  Vit-Fe Fumarate-FA (PRENATAL MULTIVITAMIN PLUS IRON) 27-0.8 MG TABS per tablet     senna-docusate (SENOKOT-S/PERICOLACE) 8.6-50 MG tablet     No current facility-administered medications for this visit.         No Known Allergies    Family History   Problem Relation Age of Onset     Skin Cancer Mother         basal, squamous cell      Skin Cancer Father         melanoma , squamous cell and basal cell      Skin Cancer Maternal Grandmother      Skin Cancer Maternal Grandfather      Skin Cancer Paternal Grandmother      Glaucoma Brother         dx  at birth  glaucoma  Sturge [place] syndrome        Neurofibromatosis Nephew         dx as a baby  brain tumors surgery age 3      Other - See Comments Niece 11        Graves Diesase       Social History     Socioeconomic History     Marital status:      Spouse name: Bertin      Number of children: 0     Years of education: 16     Highest education level: Not on file   Occupational History     Occupation:       Comment: [employer]    Social Needs     Financial resource strain: Not on file     Food insecurity:     Worry: Not on file     Inability: Not on file     Transportation needs:     Medical: Not on file     Non-medical: Not on file   Tobacco Use     Smoking status: Never Smoker     Smokeless tobacco: Never Used   Substance and Sexual Activity     Alcohol use: Not Currently     Comment: 1 drink/wk     Drug use: No     Sexual activity: Yes     Partners: Male     Birth control/protection: None   Lifestyle     Physical activity:     Days per week: Not on file     Minutes per session: Not on file     Stress: Not on file   Relationships     Social connections:     Talks on phone: Not on file     Gets together: Not on file     Attends Restorationism service: Not on file     Active member of club or organization: Not on file     Attends meetings of clubs or organizations: Not on file     Relationship status: Not on file     Intimate partner violence:     Fear of  "current or ex partner: Not on file     Emotionally abused: Not on file     Physically abused: Not on file     Forced sexual activity: Not on file   Other Topics Concern     Not on file   Social History Narrative    Exercise 30min walking daily     Kettle bell weights     Caffeine        REVIEW OF SYSTEMS  A 10 point review of systems including Constitutional, Eyes, Respiratory, Cardiovascular, Gastroenterology, Genitourinary, Integumentary, Musculoskeletal, and Psychiatric, were all negative, except for pertinent positives noted in the above HPI.    OBJECTIVE  /71   Pulse 93   Ht 1.626 m (5' 4\")   Wt 82.3 kg (181 lb 6.4 oz)   LMP 2019 (Approximate)   BMI 31.14 kg/m       General: Alert, without distress  HEENT: normocephalic, without obvious abnormality  Breast exam: deferred as she is breastfeeding   Lungs: breathing comfortably in room air.    Abdomen: soft, non-tender, non-distended, normal bowel sounds. Well healed incision   Pelvic: normal external female genitalia; normal vagina without discharge; normal cervix without lesions/masses; small uterus, anteverted, mobile, nontender; adnexae nontender and without masses; normal anus/perineum   Extremities: normal    LABS:   Last Pap 3/31/2016 (in Care Everywhere): NIL, HPV negative    ASSESSMENT/PLAN  Ana Cleveland is a 39 year old , here for her 6 week postpartum visit. Doing well.     - Repeat pap smear for cervical cancer screening by 2021  - Continue prenatal vitamin while breastfeeding  - Contraception plan: Condoms; discussed recommended birth spacing     Return for an annual health exam in 1 year.    Daysi Pleitez, MS3     OBGYN Attending Addendum    I appreciate the note above by medical student, Daysi Pleitez.  I, Carley Garcia, was present with the medical student, who participated in the service and in the documentation of the note. I have verified the history and personally performed the physical exam and medical decision " making. I have edited accordingly and agree with the assessment and plan of care as documented in the note.    Carley Garcia MD, MSCI    [facility]/OBGYN

## 2020-01-24 NOTE — NURSING NOTE
SUBJECTIVE:   Ana Cleveland is here for her 6-week postpartum checkup.     PHQ-9 score: 0  Hx of Abuse:  No    Delivery Date: 2019.    Delivering provider:  Tyesha Anthony MD.    Type of delivery:  .     Delivery complications: None  Infant gender:  girl, weight 8 pounds 15 oz.  Feeding Method:  .  Complications reported with feeding:  none, infant thriving .    Bleeding:  None.  Duration:  4 weeks.  Menses resumed:  No  Bowel/Urinary problems:  No    Contraception Planned:  condoms  She  has not had intercourse since delivery..

## 2020-01-25 ASSESSMENT — ANXIETY QUESTIONNAIRES: GAD7 TOTAL SCORE: 0

## 2020-02-17 ENCOUNTER — MEDICAL CORRESPONDENCE (OUTPATIENT)
Dept: HEALTH INFORMATION MANAGEMENT | Facility: CLINIC | Age: 40
End: 2020-02-17

## 2020-04-13 ENCOUNTER — MEDICAL CORRESPONDENCE (OUTPATIENT)
Dept: HEALTH INFORMATION MANAGEMENT | Facility: CLINIC | Age: 40
End: 2020-04-13

## 2020-06-12 ENCOUNTER — MEDICAL CORRESPONDENCE (OUTPATIENT)
Dept: HEALTH INFORMATION MANAGEMENT | Facility: CLINIC | Age: 40
End: 2020-06-12

## 2020-11-22 ENCOUNTER — HEALTH MAINTENANCE LETTER (OUTPATIENT)
Age: 40
End: 2020-11-22

## 2021-01-01 NOTE — PROGRESS NOTES
"Please see \"Imaging\" tab under \"Chart Review\" for details of today's US at the [facility].    Jerad Sow MD  Maternal-Fetal Medicine      "
0.34

## 2021-08-12 ASSESSMENT — ANXIETY QUESTIONNAIRES
8. IF YOU CHECKED OFF ANY PROBLEMS, HOW DIFFICULT HAVE THESE MADE IT FOR YOU TO DO YOUR WORK, TAKE CARE OF THINGS AT HOME, OR GET ALONG WITH OTHER PEOPLE?: NOT DIFFICULT AT ALL
2. NOT BEING ABLE TO STOP OR CONTROL WORRYING: NOT AT ALL
GAD7 TOTAL SCORE: 0
GAD7 TOTAL SCORE: 0
3. WORRYING TOO MUCH ABOUT DIFFERENT THINGS: NOT AT ALL
5. BEING SO RESTLESS THAT IT IS HARD TO SIT STILL: NOT AT ALL
7. FEELING AFRAID AS IF SOMETHING AWFUL MIGHT HAPPEN: NOT AT ALL
GAD7 TOTAL SCORE: 0
7. FEELING AFRAID AS IF SOMETHING AWFUL MIGHT HAPPEN: NOT AT ALL
1. FEELING NERVOUS, ANXIOUS, OR ON EDGE: NOT AT ALL
6. BECOMING EASILY ANNOYED OR IRRITABLE: NOT AT ALL
4. TROUBLE RELAXING: NOT AT ALL

## 2021-08-12 ASSESSMENT — ENCOUNTER SYMPTOMS
BREAST MASS: 1
SKIN CHANGES: 1
NAIL CHANGES: 0
POOR WOUND HEALING: 0
BREAST PAIN: 1

## 2021-08-13 ASSESSMENT — ANXIETY QUESTIONNAIRES: GAD7 TOTAL SCORE: 0

## 2021-08-23 NOTE — PROGRESS NOTES
"  Progress Note    SUBJECTIVE:  Ana Cleveland is an 40 year old, , who requests an Annual Preventive Exam.     Concerns today include:   1. Skin lesions on breast: Reports scattered new red skin lesions on chest.   Breast biopsies done in 2016, fibroadenoma. Denies fam hx of colon, breast, or ovarian cancer.     2. Dyspareunia since birth of last child: Deep pelvic pain with intercourse. Occurs with every episode of intercourse; has tried different positions. Describes it as being \"uncomfortable.\" Desires another pregnancy and this is interfering. Denies vaginal odor, itching, irritation, or change in discharge. Denies pelvic pain outside of intercourse. Denies urinary symptoms and changes in bowel.     Menses: monthly; moderate bleeding, lasts for 5 days; cramping is less since birth of baby.     Sexual hx:   - Denies other sexual concerns other than above.  - Male sexual partner; monogamous relationship with   - Contraception: abstinence due to pain     Mental health: feeling well; no concerns    Social Hx: little girl is 20 months; staying at home, had been laid off during COVID    Menstrual History:  Menstrual History 2019   LAST MENSTRUAL PERIOD 2019 - 2021   Menarche Age - 14 -   Period Cycle (Days) - 5 -   Period Duration (Days) - 28 -   Method of Contraception - None -   Period Pattern - Regular -   Menstrual Flow - Moderate -   Menstrual Control - Maxi pad -   Dysmenorrhea - Moderate -   PMS Symptoms - Cramping -   Reviewed Today - Yes -     Last Pap: 3/31/2016 (in Care Everywhere): NIL, HPV negative  - No hx of abnormal pap smears    Lipids: never checked, father has elevated cholesterol    Immunizations up to date    Exercise: 3 times per week, walks  Diet: no specific diet; tries to eat whole and organic foods as much as possible. Eats calcium rich foods.     Mammogram current: n/a    Last Colonoscopy: n/a      HISTORY:  Cholecalciferol 4000 units CAPS, "   Prenatal Vit-Fe Fumarate-FA (PRENATAL MULTIVITAMIN PLUS IRON) 27-0.8 MG TABS per tablet, Take 1 tablet by mouth daily    No current facility-administered medications on file prior to visit.    No Known Allergies  Immunization History   Administered Date(s) Administered     COVID-19,PF,Pfizer 2021, 2021     HPV Quadrivalent 2008, 2008, 10/28/2008     Hep B, Peds or Adolescent 1997, 2000, 2001, 2002     Historical DTP/aP 1983, 1983, 1983, 1984, 1989     Influenza Vaccine IM > 6 months Valent IIV4 2018, 10/30/2018, 2019     MMR 02/10/1984, 1995     Meningococcal (Menomune ) 2001     OPV, trivalent, live 1983, 1983, 1984, 1989     TDAP Vaccine (Boostrix) 2019     Td (Adult), Adsorbed 1999       OB History    Para Term  AB Living   2 1 1 0 1 1   SAB TAB Ectopic Multiple Live Births   1 0 0 0 1     Past Medical History:   Diagnosis Date     Fibroadenoma of breast, left     bx  benign has clips in place      History of blood transfusion     Received after my , Dec. 2019     Psoriasis      Past Surgical History:   Procedure Laterality Date      SECTION N/A 2019    Procedure:  SECTION;  Surgeon: Tyesha Anthony MD;  [facility]     DENTAL SURGERY      [place] teeth     DILATION AND CURETTAGE SUCTION N/A 2018    Procedure: DILATION AND CURETTAGE SUCTION   (12 WEEKS);  Surgeon: Tyesha Anthony MD;  [facility]     US BREAST BIOPSY LT       Family History   Problem Relation Age of Onset     Skin Cancer Mother         basal, squamous cell      Skin Cancer Father         melanoma , squamous cell and basal cell      Skin Cancer Maternal Grandmother      Skin Cancer Maternal Grandfather      Skin Cancer Paternal Grandmother      Glaucoma Brother         dx  at birth  glaucoma  Sturge [place] syndrome         Neurofibromatosis Nephew         dx as a baby  brain tumors surgery age 3      Other - See Comments Niece 11        Graves Diesase     Breast Cancer No family hx of      Colon Cancer No family hx of      Social History     Socioeconomic History     Marital status:      Spouse name: Bertin      Number of children: 0     Years of education: 16     Highest education level: None   Occupational History     Occupation:       Comment: [employer]    Tobacco Use     Smoking status: Never Smoker     Smokeless tobacco: Never Used   Substance and Sexual Activity     Alcohol use: Yes     Comment: 3-6 drinks per week     Drug use: No     Sexual activity: Yes     Partners: Male     Birth control/protection: Condom, None   Other Topics Concern     None   Social History Narrative    Exercise 30min walking daily     Kettle bell weights     Caffeine        ROS    Answers for HPI/ROS submitted by the patient on 8/12/2021  LAURIE 7 TOTAL SCORE: 0  General Symptoms: No  Skin Symptoms: Yes  HENT Symptoms: No  EYE SYMPTOMS: No  HEART SYMPTOMS: No  LUNG SYMPTOMS: No  INTESTINAL SYMPTOMS: No  URINARY SYMPTOMS: No  GYNECOLOGIC SYMPTOMS: No  BREAST SYMPTOMS: Yes  SKELETAL SYMPTOMS: No  BLOOD SYMPTOMS: No  NERVOUS SYSTEM SYMPTOMS: No  MENTAL HEALTH SYMPTOMS: No  Changes in hair: No  Changes in moles/birth marks: Yes  Itching: No  Rashes: No  Changes in nails: No  Acne: No  Hair in places you don't want it: No  Change in facial hair: No  Warts: No  Non-healing sores: No  Scarring: No  Flaking of skin: Yes  Color changes of hands/feet in cold : No  Sun sensitivity: No  Skin thickening: No  Discharge: No  Lumps: Yes  Pain: Yes  Nipple retraction: No    PHQ-2 Score:     PHQ-2 ( 1999 Pfizer) 1/24/2020 11/5/2019   Q1: Little interest or pleasure in doing things 0 0   Q2: Feeling down, depressed or hopeless 0 0   PHQ-2 Score 0 0   Q1: Little interest or pleasure in doing things - -   Q2: Feeling down, depressed or hopeless - -  "  PHQ-2 Score - -     LAURIE-7 SCORE 11/5/2019 1/24/2020 8/12/2021   Total Score - - 0 (minimal anxiety)   Total Score 0 0 0       EXAM:  Blood pressure 116/83, pulse 80, height 1.626 m (5' 4\"), weight 78.9 kg (174 lb), last menstrual period 08/16/2021, not currently breastfeeding. Body mass index is 29.87 kg/m .  General - pleasant female in no acute distress.  Skin - no suspicious lesions or rashes; small bright red spots scattered on breast consistent in appearance with cherry angiomas  EENT-  euthyroid with out palpable nodules  Neck - supple without lymphadenopathy.  Lungs - clear to auscultation bilaterally.  Heart - regular rate and rhythm without murmur.  Abdomen - soft, nontender, nondistended, no masses or organomegaly noted.  Musculoskeletal - no gross deformities.  Neurological - normal strength, sensation, and mental status.    Breast Exam:  Breast: Without visible skin changes. No dimpling or lesions seen.   Breasts supple, non-tender with palpation, no dominant mass, nodularity, or nipple discharge noted bilaterally. Axillary nodes negative.      Pelvic Exam:  EG/BUS: Normal genital architecture without lesions, erythema or abnormal secretions; Bartholin's, Urethra, [place]'s normal   Urethral meatus: normal   Urethra: no masses, tenderness, or scarring   Bladder: no masses or tenderness   Vagina: moist, pink, rugae with creamy, white and odorless secretions; discomfort with speculum exam and digital pressure on vaginal walls.   Cervix: pink, moist, closed, without lesion or CMT  Uterus: anteverted,  and small, smooth, firm, mobile w/o pain  Adnexa: Within normal limits and No masses, nodularity, tenderness  Rectum: anus normal       ASSESSMENT:  Encounter Diagnoses   Name Primary?     Screening for cervical cancer      Screening for cholesterol level      Family history of hyperlipidemia      Dyspareunia, female      Preventative health care Yes        PLAN:   Orders Placed This Encounter   Procedures     " US Transvaginal Non OB     Lipid Profile     Physical Therapy Referral     Preventive Health:  - pap smear done today  - lipids ordered  - immunizations up to date    Dyspareunia:  - TV pelvic ultrasound ordered  - Referred patient for pelvic floor PT.   - Return to clinic in 3 months or sooner, PRN, for follow-up.    Diaz Angiomas noted on chest; provided patient with reassurance that these are benign lesions. May follow-up with dermatology if bothersome to patient.     Additional teaching done at this visit regarding calcium (1200 mg per day), self breast exam, exercise, birth control, preconception, mental health and weight/diet. Continue prenatal vitamin.     Return to clinic in one year.  Follow-up as needed.    Li Cowan, DNP, APRN, WHNP             Normal

## 2021-08-24 ENCOUNTER — TELEPHONE (OUTPATIENT)
Dept: OBGYN | Facility: CLINIC | Age: 41
End: 2021-08-24

## 2021-08-24 ENCOUNTER — OFFICE VISIT (OUTPATIENT)
Dept: OBGYN | Facility: CLINIC | Age: 41
End: 2021-08-24
Attending: NURSE PRACTITIONER
Payer: COMMERCIAL

## 2021-08-24 VITALS
HEIGHT: 64 IN | HEART RATE: 80 BPM | BODY MASS INDEX: 29.71 KG/M2 | DIASTOLIC BLOOD PRESSURE: 83 MMHG | WEIGHT: 174 LBS | SYSTOLIC BLOOD PRESSURE: 116 MMHG

## 2021-08-24 DIAGNOSIS — N94.10 DYSPAREUNIA, FEMALE: ICD-10-CM

## 2021-08-24 DIAGNOSIS — Z83.438 FAMILY HISTORY OF HYPERLIPIDEMIA: ICD-10-CM

## 2021-08-24 DIAGNOSIS — E78.00 ELEVATED CHOLESTEROL: ICD-10-CM

## 2021-08-24 DIAGNOSIS — Z13.220 SCREENING FOR CHOLESTEROL LEVEL: ICD-10-CM

## 2021-08-24 DIAGNOSIS — D18.01 CHERRY ANGIOMA: ICD-10-CM

## 2021-08-24 DIAGNOSIS — Z00.00 PREVENTATIVE HEALTH CARE: Primary | ICD-10-CM

## 2021-08-24 DIAGNOSIS — Z12.4 SCREENING FOR CERVICAL CANCER: ICD-10-CM

## 2021-08-24 LAB
CHOLEST SERPL-MCNC: 268 MG/DL
FASTING STATUS PATIENT QL REPORTED: NO
HDLC SERPL-MCNC: 63 MG/DL
LDLC SERPL CALC-MCNC: 183 MG/DL
NONHDLC SERPL-MCNC: 205 MG/DL
TRIGL SERPL-MCNC: 109 MG/DL

## 2021-08-24 PROCEDURE — 87624 HPV HI-RISK TYP POOLED RSLT: CPT | Performed by: NURSE PRACTITIONER

## 2021-08-24 PROCEDURE — G0463 HOSPITAL OUTPT CLINIC VISIT: HCPCS

## 2021-08-24 PROCEDURE — G0145 SCR C/V CYTO,THINLAYER,RESCR: HCPCS | Performed by: NURSE PRACTITIONER

## 2021-08-24 PROCEDURE — 80061 LIPID PANEL: CPT | Performed by: NURSE PRACTITIONER

## 2021-08-24 PROCEDURE — 99395 PREV VISIT EST AGE 18-39: CPT | Performed by: NURSE PRACTITIONER

## 2021-08-24 PROCEDURE — 99212 OFFICE O/P EST SF 10 MIN: CPT | Mod: 25 | Performed by: NURSE PRACTITIONER

## 2021-08-24 PROCEDURE — 36415 COLL VENOUS BLD VENIPUNCTURE: CPT | Performed by: NURSE PRACTITIONER

## 2021-08-24 ASSESSMENT — PAIN SCALES - GENERAL: PAINLEVEL: NO PAIN (0)

## 2021-08-24 ASSESSMENT — MIFFLIN-ST. JEOR: SCORE: 1454.26

## 2021-08-24 NOTE — LETTER
"2021       RE: Ana Cleveland  [address] [facility] [address]     Dear Colleague,    Thank you for referring your patient, Ana Cleveland, to the [facility] [place] at [facility]. Please see a copy of my visit note below.      Progress Note    SUBJECTIVE:  Ana Cleveland is an 40 year old, , who requests an Annual Preventive Exam.     Concerns today include:   1. Skin lesions on breast: Reports scattered new red skin lesions on chest.   Breast biopsies done in 2016, fibroadenoma. Denies fam hx of colon, breast, or ovarian cancer.     2. Dyspareunia since birth of last child: Deep pelvic pain with intercourse. Occurs with every episode of intercourse; has tried different positions. Describes it as being \"uncomfortable.\" Desires another pregnancy and this is interfering. Denies vaginal odor, itching, irritation, or change in discharge. Denies pelvic pain outside of intercourse. Denies urinary symptoms and changes in bowel.     Menses: monthly; moderate bleeding, lasts for 5 days; cramping is less since birth of baby.     Sexual hx:   - Denies other sexual concerns other than above.  - Male sexual partner; monogamous relationship with   - Contraception: abstinence due to pain     Mental health: feeling well; no concerns    Social Hx: little girl is 20 months; staying at home, had been laid off during COVID    Menstrual History:  Menstrual History 2019   LAST MENSTRUAL PERIOD 2019 - 2021   Menarche Age - 14 -   Period Cycle (Days) - 5 -   Period Duration (Days) - 28 -   Method of Contraception - None -   Period Pattern - Regular -   Menstrual Flow - Moderate -   Menstrual Control - Maxi pad -   Dysmenorrhea - Moderate -   PMS Symptoms - Cramping -   Reviewed Today - Yes -     Last Pap: 3/31/2016 (in Care Everywhere): NIL, HPV negative  - No hx of abnormal pap smears    Lipids: never " checked, father has elevated cholesterol    Immunizations up to date    Exercise: 3 times per week, walks  Diet: no specific diet; tries to eat whole and organic foods as much as possible. Eats calcium rich foods.     Mammogram current: n/a    Last Colonoscopy: n/a      HISTORY:  Cholecalciferol 4000 units CAPS,   Prenatal Vit-Fe Fumarate-FA (PRENATAL MULTIVITAMIN PLUS IRON) 27-0.8 MG TABS per tablet, Take 1 tablet by mouth daily    No current facility-administered medications on file prior to visit.    No Known Allergies  Immunization History   Administered Date(s) Administered     COVID-19,PF,Pfizer 2021, 2021     HPV Quadrivalent 2008, 2008, 10/28/2008     Hep B, Peds or Adolescent 1997, 2000, 2001, 2002     Historical DTP/aP 1983, 1983, 1983, 1984, 1989     Influenza Vaccine IM > 6 months Valent IIV4 2018, 10/30/2018, 2019     MMR 02/10/1984, 1995     Meningococcal (Menomune ) 2001     OPV, trivalent, live 1983, 1983, 1984, 1989     TDAP Vaccine (Boostrix) 2019     Td (Adult), Adsorbed 1999       OB History    Para Term  AB Living   2 1 1 0 1 1   SAB TAB Ectopic Multiple Live Births   1 0 0 0 1     Past Medical History:   Diagnosis Date     Fibroadenoma of breast, left     bx  benign has clips in place      History of blood transfusion     Received after my , Dec. 2019     Psoriasis      Past Surgical History:   Procedure Laterality Date      SECTION N/A 2019    Procedure:  SECTION;  Surgeon: Tyesha Anthony MD;  [facility]     DENTAL SURGERY      [place] teeth     DILATION AND CURETTAGE SUCTION N/A 2018    Procedure: DILATION AND CURETTAGE SUCTION   (12 WEEKS);  Surgeon: Tyesha Anthony MD;  [facility]     US BREAST BIOPSY LT       Family History   Problem Relation Age of Onset     Skin  Cancer Mother         basal, squamous cell      Skin Cancer Father         melanoma , squamous cell and basal cell      Skin Cancer Maternal Grandmother      Skin Cancer Maternal Grandfather      Skin Cancer Paternal Grandmother      Glaucoma Brother         dx  at birth  glaucoma  Sturge [place] syndrome        Neurofibromatosis Nephew         dx as a baby  brain tumors surgery age 3      Other - See Comments Niece 11        Graves Diesase     Breast Cancer No family hx of      Colon Cancer No family hx of      Social History     Socioeconomic History     Marital status:      Spouse name: Bertin      Number of children: 0     Years of education: 16     Highest education level: None   Occupational History     Occupation:       Comment: [employer]    Tobacco Use     Smoking status: Never Smoker     Smokeless tobacco: Never Used   Substance and Sexual Activity     Alcohol use: Yes     Comment: 3-6 drinks per week     Drug use: No     Sexual activity: Yes     Partners: Male     Birth control/protection: Condom, None   Other Topics Concern     None   Social History Narrative    Exercise 30min walking daily     Kettle bell weights     Caffeine        ROS    Answers for HPI/ROS submitted by the patient on 8/12/2021  LAURIE 7 TOTAL SCORE: 0  General Symptoms: No  Skin Symptoms: Yes  HENT Symptoms: No  EYE SYMPTOMS: No  HEART SYMPTOMS: No  LUNG SYMPTOMS: No  INTESTINAL SYMPTOMS: No  URINARY SYMPTOMS: No  GYNECOLOGIC SYMPTOMS: No  BREAST SYMPTOMS: Yes  SKELETAL SYMPTOMS: No  BLOOD SYMPTOMS: No  NERVOUS SYSTEM SYMPTOMS: No  MENTAL HEALTH SYMPTOMS: No  Changes in hair: No  Changes in moles/birth marks: Yes  Itching: No  Rashes: No  Changes in nails: No  Acne: No  Hair in places you don't want it: No  Change in facial hair: No  Warts: No  Non-healing sores: No  Scarring: No  Flaking of skin: Yes  Color changes of hands/feet in cold : No  Sun sensitivity: No  Skin thickening: No  Discharge: No  Lumps: Yes  Pain:  "Yes  Nipple retraction: No    PHQ-2 Score:     PHQ-2 ( 1999 Pfizer) 1/24/2020 11/5/2019   Q1: Little interest or pleasure in doing things 0 0   Q2: Feeling down, depressed or hopeless 0 0   PHQ-2 Score 0 0   Q1: Little interest or pleasure in doing things - -   Q2: Feeling down, depressed or hopeless - -   PHQ-2 Score - -     LAURIE-7 SCORE 11/5/2019 1/24/2020 8/12/2021   Total Score - - 0 (minimal anxiety)   Total Score 0 0 0       EXAM:  Blood pressure 116/83, pulse 80, height 1.626 m (5' 4\"), weight 78.9 kg (174 lb), last menstrual period 08/16/2021, not currently breastfeeding. Body mass index is 29.87 kg/m .  General - pleasant female in no acute distress.  Skin - no suspicious lesions or rashes; small bright red spots scattered on breast consistent in appearance with cherry angiomas  EENT-  euthyroid with out palpable nodules  Neck - supple without lymphadenopathy.  Lungs - clear to auscultation bilaterally.  Heart - regular rate and rhythm without murmur.  Abdomen - soft, nontender, nondistended, no masses or organomegaly noted.  Musculoskeletal - no gross deformities.  Neurological - normal strength, sensation, and mental status.    Breast Exam:  Breast: Without visible skin changes. No dimpling or lesions seen.   Breasts supple, non-tender with palpation, no dominant mass, nodularity, or nipple discharge noted bilaterally. Axillary nodes negative.      Pelvic Exam:  EG/BUS: Normal genital architecture without lesions, erythema or abnormal secretions; Bartholin's, Urethra, [place]'s normal   Urethral meatus: normal   Urethra: no masses, tenderness, or scarring   Bladder: no masses or tenderness   Vagina: moist, pink, rugae with creamy, white and odorless secretions; discomfort with speculum exam and digital pressure on vaginal walls.   Cervix: pink, moist, closed, without lesion or CMT  Uterus: anteverted,  and small, smooth, firm, mobile w/o pain  Adnexa: Within normal limits and No masses, nodularity, " tenderness  Rectum: anus normal       ASSESSMENT:  Encounter Diagnoses   Name Primary?     Screening for cervical cancer      Screening for cholesterol level      Family history of hyperlipidemia      Dyspareunia, female      Preventative health care Yes        PLAN:   Orders Placed This Encounter   Procedures     US Transvaginal Non OB     Lipid Profile     Physical Therapy Referral     Preventive Health:  - pap smear done today  - lipids ordered  - immunizations up to date    Dyspareunia:  - TV pelvic ultrasound ordered  - Referred patient for pelvic floor PT.   - Return to clinic in 3 months or sooner, PRN, for follow-up.    Diaz Angiomas noted on chest; provided patient with reassurance that these are benign lesions. May follow-up with dermatology if bothersome to patient.     Additional teaching done at this visit regarding calcium (1200 mg per day), self breast exam, exercise, birth control, preconception, mental health and weight/diet. Continue prenatal vitamin.     Return to clinic in one year.  Follow-up as needed.    Li Cowan, DNP, APRN, WHNP

## 2021-08-24 NOTE — PATIENT INSTRUCTIONS
Schedule pelvic ultrasound at the   Then go to the lab to have your cholesterol checked  Establish care with physical therapist.  Return to clinic for follow-up in 3 months or sooner PRN.  Pap smear done today; you will be notified of results in 5-7 days by Heather.

## 2021-08-24 NOTE — TELEPHONE ENCOUNTER
Notified Ana of her elevated total and LDL cholesterol. With family history of hyperlipidemia (father), recommended patient be evaluated by the [facility] for Cardiovascular Disease Prevention. Patient is planning another pregnancy in the near future. At this time statin therapy is not recommended in pregnancy, however, may be helpful to [facility] to gather more information. Ana was agreeable. Referral placed. Encouraged heart healthy diet and increasing exercise to >150 minutes per week of aerobic activity. Recommended reviewing the American Heart Association resources on eating to lower cholesterol. Pt had no further questions at the end of this call.    Li Cowan, DNP, APRN, WHNP

## 2021-08-26 LAB
BKR LAB AP GYN ADEQUACY: NORMAL
BKR LAB AP GYN INTERPRETATION: NORMAL
BKR LAB AP HPV REFLEX: NORMAL
BKR LAB AP LMP: NORMAL
BKR LAB AP PREVIOUS ABNORMAL: NORMAL
PATH REPORT.COMMENTS IMP SPEC: NORMAL
PATH REPORT.RELEVANT HX SPEC: NORMAL

## 2021-08-27 LAB
HUMAN PAPILLOMA VIRUS 16 DNA: NEGATIVE
HUMAN PAPILLOMA VIRUS 18 DNA: NEGATIVE
HUMAN PAPILLOMA VIRUS FINAL DIAGNOSIS: NORMAL
HUMAN PAPILLOMA VIRUS OTHER HR: NEGATIVE

## 2021-09-19 ENCOUNTER — HEALTH MAINTENANCE LETTER (OUTPATIENT)
Age: 41
End: 2021-09-19

## 2022-04-15 ENCOUNTER — OFFICE VISIT (OUTPATIENT)
Dept: FAMILY MEDICINE | Facility: CLINIC | Age: 42
End: 2022-04-15
Payer: COMMERCIAL

## 2022-04-15 VITALS
BODY MASS INDEX: 28.68 KG/M2 | SYSTOLIC BLOOD PRESSURE: 107 MMHG | WEIGHT: 168 LBS | RESPIRATION RATE: 16 BRPM | HEART RATE: 74 BPM | OXYGEN SATURATION: 99 % | TEMPERATURE: 97.3 F | DIASTOLIC BLOOD PRESSURE: 72 MMHG | HEIGHT: 64 IN

## 2022-04-15 DIAGNOSIS — R19.5 NARROWING OF STOOLS: ICD-10-CM

## 2022-04-15 DIAGNOSIS — R19.5 LOOSE STOOLS: Primary | ICD-10-CM

## 2022-04-15 LAB
ALBUMIN SERPL-MCNC: 3.8 G/DL (ref 3.4–5)
ALP SERPL-CCNC: 60 U/L (ref 40–150)
ALT SERPL W P-5'-P-CCNC: 16 U/L (ref 0–50)
ANION GAP SERPL CALCULATED.3IONS-SCNC: 5 MMOL/L (ref 3–14)
AST SERPL W P-5'-P-CCNC: 13 U/L (ref 0–45)
BASOPHILS # BLD AUTO: 0 10E3/UL (ref 0–0.2)
BASOPHILS NFR BLD AUTO: 0 %
BILIRUB SERPL-MCNC: 0.3 MG/DL (ref 0.2–1.3)
BUN SERPL-MCNC: 13 MG/DL (ref 7–30)
C COLI+JEJUNI+LARI FUSA STL QL NAA+PROBE: NOT DETECTED
C DIFF TOX B STL QL: NEGATIVE
CALCIUM SERPL-MCNC: 9.5 MG/DL (ref 8.5–10.1)
CHLORIDE BLD-SCNC: 107 MMOL/L (ref 94–109)
CO2 SERPL-SCNC: 27 MMOL/L (ref 20–32)
CREAT SERPL-MCNC: 0.63 MG/DL (ref 0.52–1.04)
CRP SERPL-MCNC: <2.9 MG/L (ref 0–8)
EC STX1 GENE STL QL NAA+PROBE: NOT DETECTED
EC STX2 GENE STL QL NAA+PROBE: NOT DETECTED
EOSINOPHIL # BLD AUTO: 0.2 10E3/UL (ref 0–0.7)
EOSINOPHIL NFR BLD AUTO: 4 %
ERYTHROCYTE [DISTWIDTH] IN BLOOD BY AUTOMATED COUNT: 13.1 % (ref 10–15)
ERYTHROCYTE [SEDIMENTATION RATE] IN BLOOD BY WESTERGREN METHOD: 10 MM/HR (ref 0–20)
GFR SERPL CREATININE-BSD FRML MDRD: >90 ML/MIN/1.73M2
GLUCOSE BLD-MCNC: 96 MG/DL (ref 70–99)
HBA1C MFR BLD: 4.6 % (ref 0–5.6)
HCT VFR BLD AUTO: 44.8 % (ref 35–47)
HGB BLD-MCNC: 15 G/DL (ref 11.7–15.7)
LACTOFERRIN STL QL IA: NEGATIVE
LIPASE SERPL-CCNC: 82 U/L (ref 73–393)
LYMPHOCYTES # BLD AUTO: 1.6 10E3/UL (ref 0.8–5.3)
LYMPHOCYTES NFR BLD AUTO: 33 %
MCH RBC QN AUTO: 33.5 PG (ref 26.5–33)
MCHC RBC AUTO-ENTMCNC: 33.5 G/DL (ref 31.5–36.5)
MCV RBC AUTO: 100 FL (ref 78–100)
MONOCYTES # BLD AUTO: 0.5 10E3/UL (ref 0–1.3)
MONOCYTES NFR BLD AUTO: 10 %
NEUTROPHILS # BLD AUTO: 2.5 10E3/UL (ref 1.6–8.3)
NEUTROPHILS NFR BLD AUTO: 53 %
NOROV GI+II ORF1-ORF2 JNC STL QL NAA+PR: NOT DETECTED
PLATELET # BLD AUTO: 296 10E3/UL (ref 150–450)
POTASSIUM BLD-SCNC: 4.5 MMOL/L (ref 3.4–5.3)
PROT SERPL-MCNC: 7.9 G/DL (ref 6.8–8.8)
RBC # BLD AUTO: 4.48 10E6/UL (ref 3.8–5.2)
RVA NSP5 STL QL NAA+PROBE: NOT DETECTED
SALMONELLA SP RPOD STL QL NAA+PROBE: NOT DETECTED
SHIGELLA SP+EIEC IPAH STL QL NAA+PROBE: NOT DETECTED
SODIUM SERPL-SCNC: 139 MMOL/L (ref 133–144)
TSH SERPL DL<=0.005 MIU/L-ACNC: 2.78 MU/L (ref 0.4–4)
V CHOL+PARA RFBL+TRKH+TNAA STL QL NAA+PR: NOT DETECTED
WBC # BLD AUTO: 4.8 10E3/UL (ref 4–11)
Y ENTERO RECN STL QL NAA+PROBE: NOT DETECTED

## 2022-04-15 PROCEDURE — 83630 LACTOFERRIN FECAL (QUAL): CPT | Performed by: INTERNAL MEDICINE

## 2022-04-15 PROCEDURE — 87177 OVA AND PARASITES SMEARS: CPT | Performed by: INTERNAL MEDICINE

## 2022-04-15 PROCEDURE — 83036 HEMOGLOBIN GLYCOSYLATED A1C: CPT | Performed by: INTERNAL MEDICINE

## 2022-04-15 PROCEDURE — 87493 C DIFF AMPLIFIED PROBE: CPT | Mod: 59 | Performed by: INTERNAL MEDICINE

## 2022-04-15 PROCEDURE — 87506 IADNA-DNA/RNA PROBE TQ 6-11: CPT | Performed by: INTERNAL MEDICINE

## 2022-04-15 PROCEDURE — 83690 ASSAY OF LIPASE: CPT | Performed by: INTERNAL MEDICINE

## 2022-04-15 PROCEDURE — 36415 COLL VENOUS BLD VENIPUNCTURE: CPT | Performed by: INTERNAL MEDICINE

## 2022-04-15 PROCEDURE — 99204 OFFICE O/P NEW MOD 45 MIN: CPT | Performed by: INTERNAL MEDICINE

## 2022-04-15 PROCEDURE — 86140 C-REACTIVE PROTEIN: CPT | Performed by: INTERNAL MEDICINE

## 2022-04-15 PROCEDURE — 80050 GENERAL HEALTH PANEL: CPT | Performed by: INTERNAL MEDICINE

## 2022-04-15 PROCEDURE — 87209 SMEAR COMPLEX STAIN: CPT | Performed by: INTERNAL MEDICINE

## 2022-04-15 PROCEDURE — 85652 RBC SED RATE AUTOMATED: CPT | Performed by: INTERNAL MEDICINE

## 2022-04-15 ASSESSMENT — PAIN SCALES - GENERAL: PAINLEVEL: NO PAIN (0)

## 2022-04-15 NOTE — PROGRESS NOTES
Assessment & Plan     Loose stools  Narrowing of stools  X 2 months   Check labs, stool studies, and refer to GI for next steps.  If recurrent or more frequent chest fullness/palpitations, RTC or notify us for further evaluation. Would consider EKG/further testing. Patient highly agreeable to plan.  - Adult Gastro Ref - Consult Only  - CBC with Platelets & Differential  - Comprehensive metabolic panel  - Hemoglobin A1c  - TSH with free T4 reflex  - Erythrocyte sedimentation rate auto  - CRP inflammation  - Lipase  - Ova and Parasite Exam Routine  - Enteric Bacteria and Virus Panel by ISABEL Stool  - Clostridium difficile Toxin B PCR  - Fecal Lactoferrin      Return in about 3 months (around 7/15/2022) for Follow up, with me, in person, sooner if symptoms worsen or do not improve.    DO RIVERA Olson [facility] AYSHA Perez is a 41 year old who presents for the following health issues     HPI:    Gastric distress concerns.   Narrow stools most days x 2 months. Had COVID two months ago. Since then stools different but does admit to some gastric issues prior to COVID infection, more noticeable after COVID however. Never had narrow stools except last two months.   No constipation, mostly loose stools, about two loose stools a day.   Does get urgency/abd discomfort not always but sometimes with loose stools and associated infrequently with episodes of fullness in chest/palpitations/dizziness, self resolves. Denies cp/palpitations otherwise, no symptoms with exercise or exertion. No current symptoms. No syncope.  1-2 loose or narrow stools a day.   No known family hx of colon cancer  No blood in stool  No unintended weight loss  No n/v.   Normal appetite    No specific food group triggers.   Hydrated  No travel or experimental foods recently    Review of Systems   Constitutional, HEENT, cardiovascular, pulmonary, gi and gu systems are negative, except as otherwise noted.      Objective    BP  "107/72 (BP Location: Left arm, Patient Position: Sitting, Cuff Size: Adult Regular)   Pulse 74   Temp 97.3  F (36.3  C)   Resp 16   Ht 1.626 m (5' 4\")   Wt 76.2 kg (168 lb)   SpO2 99%   BMI 28.84 kg/m    Body mass index is 28.84 kg/m .  Physical Exam     GENERAL APPEARANCE: AAOx3, no distress. Well developed.    RESP: Lungs CTA bilaterally. No w/r/r. No distress     CV: RRR, S1/S2 present. No m/r/c.     ABDOMEN:  soft, nontender, no distention. No rebound or guarding.     EXT: No c/c/e in lower extremities b/l. No rashes or deformities noted.    PSYCH: appropriate mood and affect.               "

## 2022-04-18 LAB — O+P STL MICRO: NEGATIVE

## 2022-11-21 ENCOUNTER — HEALTH MAINTENANCE LETTER (OUTPATIENT)
Age: 42
End: 2022-11-21

## 2022-12-12 ASSESSMENT — ENCOUNTER SYMPTOMS
HEMATOCHEZIA: 0
ARTHRALGIAS: 0
SORE THROAT: 0
DYSURIA: 0
COUGH: 0
DIARRHEA: 0
PARESTHESIAS: 0
ABDOMINAL PAIN: 0
CHILLS: 0
HEADACHES: 0
NERVOUS/ANXIOUS: 0
JOINT SWELLING: 0
FEVER: 0
CONSTIPATION: 0
MYALGIAS: 0
PALPITATIONS: 0
WEAKNESS: 0
BREAST MASS: 0
FREQUENCY: 0
EYE PAIN: 0
NAUSEA: 0
SHORTNESS OF BREATH: 0
HEMATURIA: 0
DIZZINESS: 0
HEARTBURN: 0

## 2022-12-13 ENCOUNTER — ANCILLARY PROCEDURE (OUTPATIENT)
Dept: GENERAL RADIOLOGY | Facility: CLINIC | Age: 42
End: 2022-12-13
Attending: PHYSICIAN ASSISTANT
Payer: COMMERCIAL

## 2022-12-13 ENCOUNTER — OFFICE VISIT (OUTPATIENT)
Dept: FAMILY MEDICINE | Facility: CLINIC | Age: 42
End: 2022-12-13
Payer: COMMERCIAL

## 2022-12-13 VITALS
WEIGHT: 171.6 LBS | HEART RATE: 77 BPM | DIASTOLIC BLOOD PRESSURE: 70 MMHG | BODY MASS INDEX: 29.3 KG/M2 | SYSTOLIC BLOOD PRESSURE: 109 MMHG | OXYGEN SATURATION: 98 % | TEMPERATURE: 98.8 F | HEIGHT: 64 IN

## 2022-12-13 DIAGNOSIS — R07.9 CHEST PAIN, UNSPECIFIED TYPE: ICD-10-CM

## 2022-12-13 DIAGNOSIS — Z00.00 ROUTINE GENERAL MEDICAL EXAMINATION AT A HEALTH CARE FACILITY: Primary | ICD-10-CM

## 2022-12-13 DIAGNOSIS — M62.552 ATROPHY OF MUSCLE OF LEFT THIGH: ICD-10-CM

## 2022-12-13 DIAGNOSIS — U09.9 POST-COVID SYNDROME: ICD-10-CM

## 2022-12-13 DIAGNOSIS — Z13.220 SCREENING FOR HYPERLIPIDEMIA: ICD-10-CM

## 2022-12-13 PROCEDURE — 71046 X-RAY EXAM CHEST 2 VIEWS: CPT | Mod: TC | Performed by: RADIOLOGY

## 2022-12-13 PROCEDURE — 80061 LIPID PANEL: CPT | Performed by: PHYSICIAN ASSISTANT

## 2022-12-13 PROCEDURE — 36415 COLL VENOUS BLD VENIPUNCTURE: CPT | Performed by: PHYSICIAN ASSISTANT

## 2022-12-13 PROCEDURE — 99214 OFFICE O/P EST MOD 30 MIN: CPT | Mod: 25 | Performed by: PHYSICIAN ASSISTANT

## 2022-12-13 PROCEDURE — 99396 PREV VISIT EST AGE 40-64: CPT | Performed by: PHYSICIAN ASSISTANT

## 2022-12-13 PROCEDURE — 93000 ELECTROCARDIOGRAM COMPLETE: CPT | Performed by: PHYSICIAN ASSISTANT

## 2022-12-13 ASSESSMENT — ENCOUNTER SYMPTOMS
PALPITATIONS: 0
DIZZINESS: 0
DIARRHEA: 0
SHORTNESS OF BREATH: 0
NERVOUS/ANXIOUS: 0
JOINT SWELLING: 0
CONSTIPATION: 0
FREQUENCY: 0
HEARTBURN: 0
NAUSEA: 0
CHILLS: 0
HEADACHES: 0
FEVER: 0
PARESTHESIAS: 0
HEMATURIA: 0
SORE THROAT: 0
COUGH: 0
DYSURIA: 0
ARTHRALGIAS: 0
MYALGIAS: 0
BREAST MASS: 0
ABDOMINAL PAIN: 0
EYE PAIN: 0
WEAKNESS: 0
HEMATOCHEZIA: 0

## 2022-12-13 NOTE — PROGRESS NOTES
SUBJECTIVE:   CC: Ana is an 42 year old who presents for preventive health visit.     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     The 10-year ASCVD risk score (Ramesh SCHREIBER, et al., 2019) is: 0.6%    Values used to calculate the score:      Age: 42 years      Sex: Female      Is Non- : No      Diabetic: No      Tobacco smoker: No      Systolic Blood Pressure: 109 mmHg      Is BP treated: No      HDL Cholesterol: 63 mg/dL      Total Cholesterol: 268 mg/dL    Chest Pain      Onset: 8 months ago- noticed after recovering from a short term of covid    Description (location/character/radiation/duration): left side no radiation lasts a few seconds    Intensity:  mild    Accompanying signs and symptoms:        Shortness of breath: no        Sweating: no        Nausea/vomitting: no        Palpitations: no        Other (fevers/chills/cough/heartburn/lightheadedness): no     History (similar episodes/previous evaluation): None    Precipitating or alleviating factors:       Worse with exertion: no        Worse with breathing: no        Related to eating: YES- sometimes noticed after eating and drinking, sometimes just randomly happens       Better with burping: unsure    Therapies tried and outcome: None    Was a smoker in her 20s.            Musculoskeletal problem/pain      Duration: left side dent in outer thigh    Description  Location: left outer thigh    Intensity:  No  pain just a dent    Accompanying signs and symptoms: none    History  Previous similar problem: no   Previous evaluation:  none    Precipitating or alleviating factors:  Trauma or overuse: no   Aggravating factors include: none- possibly the way she sits in her desk    Therapies tried and outcome: nothing    Epidural a few years ago - took a few tries to get it to work.   Denies leg weakness, leg pain. No symptoms other than dent in thigh.         Today's PHQ-2 Score:   PHQ-2 ( 1999 Pfizer) 12/12/2022   Q1: Little interest or pleasure in doing things 0   Q2: Feeling down, depressed or hopeless 0   PHQ-2 Score 0   PHQ-2 Total Score (12-17 Years)- Positive if 3 or more points; Administer PHQ-A if positive -   Q1: Little interest or pleasure in doing things Not at all   Q2: Feeling down, depressed or hopeless Not at all   PHQ-2 Score 0       Have you ever done Advance Care Planning? (For example, a Health Directive, POLST, or a discussion with a medical provider or your loved ones about your wishes): No, advance care planning information given to patient to review.  Patient plans to discuss their wishes with loved ones or provider.      Social History     Tobacco Use     Smoking status: Former     Packs/day: 0.50     Years: 10.00     Pack years: 5.00     Types: Cigarettes     Start date: 1/1/2002     Quit date: 1/1/2012     Years since quitting: 10.9     Smokeless tobacco: Never   Substance Use Topics     Alcohol use: Yes     Comment: 3-6 drinks per week     If you drink alcohol do you typically have >3 drinks per day or >7 drinks per week? No    Alcohol Use 12/13/2022   Prescreen: >3 drinks/day or >7 drinks/week? -   Prescreen: >3 drinks/day or >7 drinks/week? No       Reviewed orders with patient.  Reviewed health maintenance and updated orders accordingly - Yes  BP Readings from Last 3 Encounters:   12/13/22 109/70   04/15/22 107/72   08/24/21 116/83    Wt Readings from Last 3 Encounters:   12/13/22 77.8 kg (171 lb  9.6 oz)   04/15/22 76.2 kg (168 lb)   08/24/21 78.9 kg (174 lb)                    Breast Cancer Screening:    Breast CA Risk Assessment (FHS-7) 12/12/2022   Do you have a family history of breast, colon, or ovarian cancer? No / Unknown         Mammogram Screening - Offered annual screening and updated Health Maintenance for [place] plan based on risk factor consideration    Pertinent mammograms are reviewed under the imaging tab.    History of abnormal Pap smear: NO - age 30-65 PAP every 5 years with negative HPV co-testing recommended  PAP / HPV Latest Ref Rng & Units 8/24/2021   PAP   Negative for Intraepithelial Lesion or Malignancy (NILM)   HPV16 Negative Negative   HPV18 Negative Negative   HRHPV Negative Negative     Reviewed and updated as needed this visit by clinical staff   Tobacco  Allergies  Meds  Problems  Med Hx  Surg Hx  Fam Hx          Reviewed and updated as needed this visit by Provider   Tobacco  Allergies  Meds  Problems  Med Hx  Surg Hx  Fam Hx             Review of Systems   Constitutional: Negative for chills and fever.   HENT: Negative for congestion, ear pain, hearing loss and sore throat.    Eyes: Negative for pain and visual disturbance.   Respiratory: Negative for cough and shortness of breath.    Cardiovascular: Positive for chest pain. Negative for palpitations and peripheral edema.   Gastrointestinal: Negative for abdominal pain, constipation, diarrhea, heartburn, hematochezia and nausea.   Breasts:  Negative for tenderness, breast mass and discharge.   Genitourinary: Negative for dysuria, frequency, genital sores, hematuria, pelvic pain, urgency, vaginal bleeding and vaginal discharge.   Musculoskeletal: Negative for arthralgias, joint swelling and myalgias.   Skin: Negative for rash.   Neurological: Negative for dizziness, weakness, headaches and paresthesias.   Psychiatric/Behavioral: Negative for mood changes. The patient is not nervous/anxious.           OBJECTIVE:  "  /70 (BP Location: Right arm, Patient Position: Sitting, Cuff Size: Adult Regular)   Pulse 77   Temp 98.8  F (37.1  C) (Oral)   Ht 1.619 m (5' 3.75\")   Wt 77.8 kg (171 lb 9.6 oz)   LMP 12/07/2022 (Exact Date)   SpO2 98%   BMI 29.69 kg/m    Physical Exam  GENERAL: healthy, alert and no distress  EYES: Eyes grossly normal to inspection, PERRL and conjunctivae and sclerae normal  HENT: ear canals and TM's normal, nose and mouth without ulcers or lesions  NECK: no adenopathy, no asymmetry, masses, or scars and thyroid normal to palpation  RESP: lungs clear to auscultation - no rales, rhonchi or wheezes  CV: regular rate and rhythm, normal S1 S2, no S3 or S4, no murmur, click or rub, no peripheral edema and peripheral pulses strong  ABDOMEN: soft, nontender, no hepatosplenomegaly, no masses and bowel sounds normal  MS: no gross musculoskeletal defects noted, no edema  SKIN: no suspicious lesions or rashes  NEURO: Normal strength and tone, mentation intact and speech normal  PSYCH: mentation appears normal, affect normal/bright    XRAY:  Chest 2 views:  Personally reviewed by me - no airspace, soft tissue, cardiac or bony abnormalities.  EKG was done. Normal sinus rhythm.     Diagnostic Test Results:  Labs reviewed in Epic    ASSESSMENT/PLAN:   1. Routine general medical examination at a health care facility  Well adult.     2. Chest pain, unspecified type  3. Post-COVID syndrome  Patient reports brief discomfort on left upper chest intermittently. Maybe related to eating. EKG and CXR are normal today. Suggest trying OTC acid reducing medication to see if this alters symptoms. Evaluate stress/anxiety at the time of symptoms. If not improving with time, should re-evaluate in clinic. Could consider Chest CT, stress test. She agrees with this plan - no further questions.   - XR Chest 2 Views; Future  - EKG 12-lead complete w/read - Clinics    4. Atrophy of muscle of left thigh  Unclear - lipoatrophy vs muscle " "atrophy. Could be related to epidural a few years ago and lack of innervation to the muscle. Will obtain MRI to evaluate.  - MR Femur Thigh Left wo Contrast; Future    5. Screening for hyperlipidemia  Screen.  - Lipid panel reflex to direct LDL Fasting; Future  - Lipid panel reflex to direct LDL Fasting     Patient has been advised of split billing requirements and indicates understanding: Yes      COUNSELING:  Special attention given to:        Regular exercise       Healthy diet/nutrition      BMI:   Estimated body mass index is 29.69 kg/m  as calculated from the following:    Height as of this encounter: 1.619 m (5' 3.75\").    Weight as of this encounter: 77.8 kg (171 lb 9.6 oz).   Weight management plan: Discussed healthy diet and exercise guidelines      She reports that she quit smoking about 10 years ago. Her smoking use included cigarettes. She started smoking about 20 years ago. She has a 5.00 pack-year smoking history. She has never used smokeless tobacco.          BENNY Monge [facility]  "

## 2022-12-13 NOTE — PATIENT INSTRUCTIONS
Imaging Services- Scheduling Line [phone]      Patient Education        Preventive Health Recommendations  Female Ages 40 to 49    Yearly exam:   See your health care provider every year in order to  Review health changes.   Discuss preventive care.    Review your medicines if your doctor prescribed any.    Get a Pap test every three years (unless you have an abnormal result and your provider advises testing more often).    If you get Pap tests with HPV test, you only need to test every 5 years, unless you have an abnormal result. You do not need a Pap test if your uterus was removed (hysterectomy) and you have not had cancer.    You should be tested each year for STDs (sexually transmitted diseases), if you're at risk.   Ask your doctor if you should have a mammogram.    Have a colonoscopy (test for colon cancer) if someone in your family has had colon cancer or polyps before age 50.     Have a cholesterol test every 5 years.     Have a diabetes test (fasting glucose) after age 45. If you are at risk for diabetes, you should have this test every 3 years.    Shots: Get a flu shot each year. Get a tetanus shot every 10 years.     Nutrition:   Eat at least 5 servings of fruits and vegetables each day.  Eat whole-grain bread, whole-wheat pasta and brown rice instead of white grains and rice.  Get adequate Calcium and Vitamin D.      Lifestyle  Exercise at least 150 minutes a week (an average of 30 minutes a day, 5 days a week). This will help you control your weight and prevent disease.  Limit alcohol to one drink per day.  No smoking.   Wear sunscreen to prevent skin cancer.  See your dentist every six months for an exam and cleaning.

## 2022-12-14 ENCOUNTER — MYC MEDICAL ADVICE (OUTPATIENT)
Dept: FAMILY MEDICINE | Facility: CLINIC | Age: 42
End: 2022-12-14

## 2022-12-14 DIAGNOSIS — M41.9 SCOLIOSIS, UNSPECIFIED SCOLIOSIS TYPE, UNSPECIFIED SPINAL REGION: Primary | ICD-10-CM

## 2022-12-14 LAB
CHOLEST SERPL-MCNC: 249 MG/DL
FASTING STATUS PATIENT QL REPORTED: ABNORMAL
HDLC SERPL-MCNC: 46 MG/DL
LDLC SERPL CALC-MCNC: 164 MG/DL
NONHDLC SERPL-MCNC: 203 MG/DL
TRIGL SERPL-MCNC: 193 MG/DL

## 2023-01-04 NOTE — TELEPHONE ENCOUNTER
SPINE PATIENTS - NEW PROTOCOL PREVISIT    RECORDS RECEIVED FROM: Internal   REASON FOR VISIT: Scoliosis, unspecified scoliosis type, unspecified spinal region   Date of Appt: 01/12/2023   NOTES (FOR ALL VISITS) STATUS DETAILS   OFFICE NOTE from referring provider Internal 12/14/2022 Dr Cuenca [facility] message    OFFICE NOTE from other specialist N/A    DISCHARGE SUMMARY from hospital N/A    DISCHARGE REPORT from ER N/A    EMG REPORT N/A    MEDICATION LIST N/A    IMAGING  (FOR ALL VISITS)     MRI (HEAD, NECK, SPINE) N/A    XRAY (SPINE) *NEUROSURGERY* Internal 12/13/2022 chect   CT (HEAD, NECK, SPINE) N/A

## 2023-01-09 ENCOUNTER — MYC MEDICAL ADVICE (OUTPATIENT)
Dept: FAMILY MEDICINE | Facility: CLINIC | Age: 43
End: 2023-01-09

## 2023-01-09 DIAGNOSIS — M41.9 SCOLIOSIS, UNSPECIFIED SCOLIOSIS TYPE, UNSPECIFIED SPINAL REGION: Primary | ICD-10-CM

## 2023-01-09 NOTE — TELEPHONE ENCOUNTER
RN saw that the referral was for a non-surgical consult to find out more and discuss the incidental finding of scoliosis but yet she was scheduled with a neurosurgeon. This was discussed with the pt and she wanted to cancel her appt with Dr Kumar for now. She is going to talk with her PCP about getting an order for an MRI of her spine and will call back to set up an appt with med-spine.     Cassandra Steve RN Care Coordinator   Neurology/Neurosurgery/PM&R/ Pain Management

## 2023-01-09 NOTE — TELEPHONE ENCOUNTER
This pt will need to have an MRI of her lumbar spine prior to scheduling an appt with Dr Kumar.     Cassandra Steve, RN Care Coordinator   Neurology/Neurosurgery/PM&R/ Pain Management

## 2023-01-12 ENCOUNTER — PRE VISIT (OUTPATIENT)
Dept: NEUROSURGERY | Facility: CLINIC | Age: 43
End: 2023-01-12

## 2023-01-18 NOTE — TELEPHONE ENCOUNTER
DIAGNOSIS: Scoliosis/ Sherry HUGHESC/ no images   APPOINTMENT DATE: 02/07/2023   NOTES STATUS DETAILS   OFFICE NOTE from referring provider Internal 12/14/2022-  Sherry POOL   MEDICATION LIST Internal    LABS     MRI In process Mri schedule for 01/19/23   XRAYS (IMAGES & REPORTS) Internal 12/13/2022

## 2023-01-19 ENCOUNTER — ANCILLARY PROCEDURE (OUTPATIENT)
Dept: MRI IMAGING | Facility: CLINIC | Age: 43
End: 2023-01-19
Attending: PHYSICIAN ASSISTANT
Payer: COMMERCIAL

## 2023-01-19 DIAGNOSIS — M62.552 ATROPHY OF MUSCLE OF LEFT THIGH: ICD-10-CM

## 2023-01-19 PROCEDURE — 73718 MRI LOWER EXTREMITY W/O DYE: CPT | Mod: LT | Performed by: RADIOLOGY

## 2023-01-23 DIAGNOSIS — M41.9 SCOLIOSIS: Primary | ICD-10-CM

## 2023-01-31 ASSESSMENT — ENCOUNTER SYMPTOMS
BACK PAIN: 1
PARALYSIS: 0
MUSCLE CRAMPS: 0
MUSCLE WEAKNESS: 0
NUMBNESS: 1
HEADACHES: 0
TINGLING: 1
JOINT SWELLING: 0
ARTHRALGIAS: 0
TREMORS: 0
WEAKNESS: 0
DIZZINESS: 0
SPEECH CHANGE: 0
LOSS OF CONSCIOUSNESS: 0
MYALGIAS: 1
STIFFNESS: 0
DISTURBANCES IN COORDINATION: 0
MEMORY LOSS: 0
SEIZURES: 0
NECK PAIN: 1

## 2023-02-07 ENCOUNTER — PRE VISIT (OUTPATIENT)
Dept: ORTHOPEDICS | Facility: CLINIC | Age: 43
End: 2023-02-07

## 2023-02-07 ENCOUNTER — ANCILLARY PROCEDURE (OUTPATIENT)
Dept: GENERAL RADIOLOGY | Facility: CLINIC | Age: 43
End: 2023-02-07
Attending: ORTHOPAEDIC SURGERY
Payer: COMMERCIAL

## 2023-02-07 ENCOUNTER — OFFICE VISIT (OUTPATIENT)
Dept: ORTHOPEDICS | Facility: CLINIC | Age: 43
End: 2023-02-07
Payer: COMMERCIAL

## 2023-02-07 DIAGNOSIS — M41.25 OTHER IDIOPATHIC SCOLIOSIS, THORACOLUMBAR REGION: Primary | ICD-10-CM

## 2023-02-07 DIAGNOSIS — M41.9 SCOLIOSIS, UNSPECIFIED SCOLIOSIS TYPE, UNSPECIFIED SPINAL REGION: ICD-10-CM

## 2023-02-07 DIAGNOSIS — M41.9 SCOLIOSIS: ICD-10-CM

## 2023-02-07 PROCEDURE — 72082 X-RAY EXAM ENTIRE SPI 2/3 VW: CPT | Performed by: SURGERY

## 2023-02-07 PROCEDURE — 99203 OFFICE O/P NEW LOW 30 MIN: CPT | Performed by: PHYSICIAN ASSISTANT

## 2023-02-07 NOTE — PROGRESS NOTES
REASON FOR CONSULTATION: Consult (Scoliosis )     REFERRING PHYSICIAN: Sherry Cuenca   PCP:Tyesha Anthony    History of Present Illness:    42 year old female who presents today for evaluation of scoliosis. She notes that she had chest XR recently due to complaints of chest pain that have since resolved. Radiologist noted scoliosis on XR. She was not diagnosed with scoliosis as a child. She currently has mild low back pain that is worse with sitting in a chair for too long. Also occasional thoracic pain. Pain is midline. Pain does not radiate into buttock or legs. Pain does not limit patient from doing work or her usual daily activities. Pain was exacerbated from recent pregnancy in 2019    Back 100%, Leg 0%,  Right = Left  Worse: sitting in chair too long  Better: ibuprofen, rest    Previous treatment:   - Physical therapy: none  - injections: none  - Medications: ibuprofen, marijuana    Ambulatory status at baseline: walks without use of assistive devices    Social history:  Lives at home with family  Work: marketing, desk work  Tobacco use: denies  marijuana use: occasional    Oswestry (JENISE) Questionnaire    OSWESTRY DISABILITY INDEX 2023   Count 10   Sum 4   Oswestry Score (%) 8   Some recent data might be hidden        PROMIS-10 Scores  Global Mental Health Score: (P) 18  Global Physical Health Score: (P) 16  PROMIS TOTAL - SUBSCORES: (P) 34    ROS:  A 12-point review of systems was completed and is negative except for otherwise noted above in the history of present illness.    Med Hx:  Past Medical History:   Diagnosis Date     Fibroadenoma of breast, left 2016    bx  benign has clips in place      History of blood transfusion     Received after my , Dec. 2019     Psoriasis        Surg Hx:  Past Surgical History:   Procedure Laterality Date      SECTION N/A 2019    Procedure:  SECTION;  Surgeon: Tyesha Anthony MD;  Location:  [facility]     DENTAL SURGERY       [place] teeth     DILATION AND CURETTAGE SUCTION N/A 2018    Procedure: DILATION AND CURETTAGE SUCTION   (12 WEEKS);  Surgeon: Tyesha Anthony MD;  Location: [facility]     US BREAST BIOPSY 2016       Allergies:  Allergies   Allergen Reactions     Benzonatate Visual Disturbance     Saw flashing lights and nausea and headaches       Meds:  No current outpatient medications on file.     No current facility-administered medications for this visit.       Fam Hx:  Family History   Problem Relation Age of Onset     Skin Cancer Mother         basal, squamous cell      Skin Cancer Father         melanoma , squamous cell and basal cell      Hyperlipidemia Father      Skin Cancer Maternal Grandmother      Skin Cancer Maternal Grandfather      Other Cancer Maternal Grandfather          of stomach cancer in his early 50s.     Skin Cancer Paternal Grandmother      Glaucoma Brother         dx  at birth  glaucoma  [place] syndrome        Neurofibromatosis Nephew         dx as a baby  brain tumors surgery age 3      Other - See Comments Niece 11        Graves Diesase     Hyperlipidemia Sister      Breast Cancer No family hx of      Colon Cancer No family hx of        P/S Hx:  Social History     Tobacco Use     Smoking status: Former     Packs/day: 0.50     Years: 10.00     Pack years: 5.00     Types: Cigarettes     Start date: 2002     Quit date: 2012     Years since quittin.1     Smokeless tobacco: Never   Substance Use Topics     Alcohol use: Yes     Comment: 3-6 drinks per week         Physical Exam:  Very pleasant, healthy appearing, alert, oriented x 3, cooperative.  Normal mood and affect.  Not in cardiorespiratory distress.  There were no vitals taken for this visit.  Normal upright posture.    Normal gait without assistive device.  No antalgia / imbalance.  Able to walk on toes and on heels with ease.  Back: no deformity, no skin lesions or surgical scars.  Localizes pain at lumbar  spine, midline  Tenderness: (-) midline, (-) paraspinal, (+) R and  (-) L PSIS.  ROM:   Full painless extension.   Full painless flexion, able to reach down to toes.    NO asymmetry with Fede's forward bend test    Neuro Exam:  Motor:        LOWER EXTREMITY Left Right   Hip flexion 5/5 5/5   Knee flexion 5/5 5/5   Knee extension 5/5 5/5   Ankle dorsiflexion 5/5 5/5   Ankle plantarflexion 5/5 5/5   Great toe extension 5/5 5/5      Sensory:  Intact to light touch in both LE's.   Reflexes:  Knee 1+ bilat.  Ankle 1+ bilat.  (-) Babinski, (-) clonus.    Lower Extremity:  Equal leg lengths, full pulses, (-) atrophy / asymmetry.  Full painless passive knee and ankle motion.  (-) hip ROM bilat  (-) SLR test bilateral    Imagin23 EOS full-spine ap-lat x-rays: left apex thoracolumbar curve measured at 16.1 degrees from T11- L4. Mild leg length discrepancy, right leg ~4mm longer than left. Neutral sagittal alignment. No spondylolisthesis. No significant spondylosis.    Impression:   42/F with mild thoracolumbar scoliosis, mild low back pain without neurogenic claudication or radicular symptoms      Plan:   Reviewed patient's own XR images with her today, and allowed her to take photographs for her own records.  She has mild idiopathic scoliosis.  Reassured her that her curve is at a degree that would not require surgical intervention.  We discussed that fortunately, her curve is unlikely to progress since she is past skeletal maturity.  We discussed that there is no indication for bracing since she is not during period of rapid growth (this is only necessary for young adults during growth).  We would recommend physical therapy which may help with her chronic low back pain symptoms.  If she were to ever develop radicular/neurogenic claudicatory symptoms in the future, then we would obtain an MRI.  She can follow-up with us on an as-needed basis.    - physical therapy referral    RTC as needed if she develops new back  pain or radicular symptoms    All questions and concerns were answered to the patient's apparent satisfaction before leaving the clinic.     Respectfully,  Lashanda Desouza (archana Negro), BENNY    Attestation:  I (Dr. Juan Jordan - Spine Surgeon) have personally evaluated patient with BENNY Desouza, and agree with findings and plan outlined in the note, which I also edited.  I discussed at length with the patient/family, explained the nature of spinal condition, and formulated workup and/or treatment plan together.  All questions were answered to the best of my ability and to patient's apparent satisfaction.    20 minutes spent on the date of the encounter doing chart review/review of outside records/review of test results/interpretation of tests/patient visit/documentation/discussion with other provider(s)/discussion with patient and family.    Juan Jordan MD    Orthopaedic Spine Surgery  Dept Orthopaedic Surgery, [facility]  [phone] [facility], [phone] pager  [email or web address]

## 2023-02-07 NOTE — LETTER
2/7/2023         RE: Ana Cleveland  [address] [address]        Dear Colleague,    Thank you for referring your patient, Ana Cleveland, to the [facility] [place]. Please see a copy of my visit note below.    REASON FOR CONSULTATION: Consult (Scoliosis )     REFERRING PHYSICIAN: Sherry Cuenca   PCP:Tyesha Anthony    History of Present Illness:    42 year old female who presents today for evaluation of scoliosis. She notes that she had chest XR recently due to complaints of chest pain that have since resolved. Radiologist noted scoliosis on XR. She was not diagnosed with scoliosis as a child. She currently has mild low back pain that is worse with sitting in a chair for too long. Also occasional thoracic pain. Pain is midline. Pain does not radiate into buttock or legs. Pain does not limit patient from doing work or her usual daily activities. Pain was exacerbated from recent pregnancy in 2019    Back 100%, Leg 0%,  Right = Left  Worse: sitting in chair too long  Better: ibuprofen, rest    Previous treatment:   - Physical therapy: none  - injections: none  - Medications: ibuprofen, marijuana    Ambulatory status at baseline: walks without use of assistive devices    Social history:  Lives at home with family  Work: marketing, desk work  Tobacco use: denies  marijuana use: occasional    Oswestry (JENISE) Questionnaire    OSWESTRY DISABILITY INDEX 1/31/2023   Count 10   Sum 4   Oswestry Score (%) 8   Some recent data might be hidden        PROMIS-10 Scores  Global Mental Health Score: (P) 18  Global Physical Health Score: (P) 16  PROMIS TOTAL - SUBSCORES: (P) 34    ROS:  A 12-point review of systems was completed and is negative except for otherwise noted above in the history of present illness.    Med Hx:  Past Medical History:   Diagnosis Date     Fibroadenoma of breast, left 2016    bx  benign has clips in place      History of blood transfusion      Received after my , Dec. 2019     Psoriasis        Surg Hx:  Past Surgical History:   Procedure Laterality Date      SECTION N/A 2019    Procedure:  SECTION;  Surgeon: Tyesha Anthony MD;  Location: [facility]     DENTAL SURGERY      [place] teeth     DILATION AND CURETTAGE SUCTION N/A 2018    Procedure: DILATION AND CURETTAGE SUCTION   (12 WEEKS);  Surgeon: Tyesha Anthony MD;  Location:  OR     US BREAST BIOPSY LT         Allergies:  Allergies   Allergen Reactions     Benzonatate Visual Disturbance     Saw flashing lights and nausea and headaches       Meds:  No current outpatient medications on file.     No current facility-administered medications for this visit.       Fam Hx:  Family History   Problem Relation Age of Onset     Skin Cancer Mother         basal, squamous cell      Skin Cancer Father         melanoma , squamous cell and basal cell      Hyperlipidemia Father      Skin Cancer Maternal Grandmother      Skin Cancer Maternal Grandfather      Other Cancer Maternal Grandfather          of stomach cancer in his early 50s.     Skin Cancer Paternal Grandmother      Glaucoma Brother         dx  at birth  glaucoma  Sturge Colt syndrome        Neurofibromatosis Nephew         dx as a baby  brain tumors surgery age 3      Other - See Comments Niece 11        Graves Diesase     Hyperlipidemia Sister      Breast Cancer No family hx of      Colon Cancer No family hx of        P/S Hx:  Social History     Tobacco Use     Smoking status: Former     Packs/day: 0.50     Years: 10.00     Pack years: 5.00     Types: Cigarettes     Start date: 2002     Quit date: 2012     Years since quittin.1     Smokeless tobacco: Never   Substance Use Topics     Alcohol use: Yes     Comment: 3-6 drinks per week         Physical Exam:  Very pleasant, healthy appearing, alert, oriented x 3, cooperative.  Normal mood and affect.  Not in cardiorespiratory  distress.  There were no vitals taken for this visit.  Normal upright posture.    Normal gait without assistive device.  No antalgia / imbalance.  Able to walk on toes and on heels with ease.  Back: no deformity, no skin lesions or surgical scars.  Localizes pain at lumbar spine, midline  Tenderness: (-) midline, (-) paraspinal, (+) R and  (-) L PSIS.  ROM:   Full painless extension.   Full painless flexion, able to reach down to toes.    NO asymmetry with Fede's forward bend test    Neuro Exam:  Motor:        LOWER EXTREMITY Left Right   Hip flexion 5/5 5/5   Knee flexion 5/5 5/5   Knee extension 5/5 5/5   Ankle dorsiflexion 5/5 5/5   Ankle plantarflexion 5/5 5/5   Great toe extension 5/5 5/5      Sensory:  Intact to light touch in both LE's.   Reflexes:  Knee 1+ bilat.  Ankle 1+ bilat.  (-) Babinski, (-) clonus.    Lower Extremity:  Equal leg lengths, full pulses, (-) atrophy / asymmetry.  Full painless passive knee and ankle motion.  (-) hip ROM bilat  (-) SLR test bilateral    Imagin23 EOS full-spine ap-lat x-rays: left apex thoracolumbar curve measured at 16.1 degrees from T11- L4. Mild leg length discrepancy, right leg ~4mm longer than left. Neutral sagittal alignment. No spondylolisthesis. No significant spondylosis.    Impression:   42/F with mild thoracolumbar scoliosis, mild low back pain without neurogenic claudication or radicular symptoms      Plan:   Reviewed patient's own XR images with her today, and allowed her to take photographs for her own records.  She has mild idiopathic scoliosis.  Reassured her that her curve is at a degree that would not require surgical intervention.  We discussed that fortunately, her curve is unlikely to progress since she is past skeletal maturity.  We discussed that there is no indication for bracing since she is not during period of rapid growth (this is only necessary for young adults during growth).  We would recommend physical therapy which may help with her  chronic low back pain symptoms.  If she were to ever develop radicular/neurogenic claudicatory symptoms in the future, then we would obtain an MRI.  She can follow-up with us on an as-needed basis.    - physical therapy referral    RTC as needed if she develops new back pain or radicular symptoms    All questions and concerns were answered to the patient's apparent satisfaction before leaving the clinic.     Respectfully,  Lashanda Desouza (archana Negro), BENNY    Attestation:  I (Dr. Juan Jordan - Spine Surgeon) have personally evaluated patient with BENNY Desouza, and agree with findings and plan outlined in the note, which I also edited.  I discussed at length with the patient/family, explained the nature of spinal condition, and formulated workup and/or treatment plan together.  All questions were answered to the best of my ability and to patient's apparent satisfaction.    20 minutes spent on the date of the encounter doing chart review/review of outside records/review of test results/interpretation of tests/patient visit/documentation/discussion with other provider(s)/discussion with patient and family.    Juan Jordan MD    Orthopaedic Spine Surgery  Dept Orthopaedic Surgery, [facility]  [phone] [facility], [phone] pager  [email or web address]

## 2023-09-26 ASSESSMENT — ENCOUNTER SYMPTOMS
BACK PAIN: 1
MEMORY LOSS: 0
DIZZINESS: 0
LOSS OF CONSCIOUSNESS: 0
TREMORS: 0
PARALYSIS: 0
MUSCLE WEAKNESS: 0
ARTHRALGIAS: 0
NECK PAIN: 0
STIFFNESS: 0
WEAKNESS: 0
SPEECH CHANGE: 0
MYALGIAS: 0
TINGLING: 1
MUSCLE CRAMPS: 0
JOINT SWELLING: 0
SEIZURES: 0
HEADACHES: 0
DISTURBANCES IN COORDINATION: 0
NUMBNESS: 1

## 2023-09-28 DIAGNOSIS — M41.9 SCOLIOSIS, UNSPECIFIED SCOLIOSIS TYPE, UNSPECIFIED SPINAL REGION: Primary | ICD-10-CM

## 2023-09-29 NOTE — TELEPHONE ENCOUNTER
DIAGNOSIS: Scoliosis   APPOINTMENT DATE: 10/03/2023   NOTES STATUS DETAILS   OFFICE NOTE from referring provider Internal 12/14/2022 - Anali Cuenca PA-C - [facility]   OFFICE NOTE from other specialist Internal 02/07/2023  - Juan Jordan MD - [facility]   MEDICATION LIST Internal    LABS     XRAYS (IMAGES & REPORTS) Internal 02/07/2023 - Complete Spine  12/13/2022 - Chest

## 2023-10-03 ENCOUNTER — OFFICE VISIT (OUTPATIENT)
Dept: ORTHOPEDICS | Facility: CLINIC | Age: 43
End: 2023-10-03
Payer: COMMERCIAL

## 2023-10-03 ENCOUNTER — ANCILLARY PROCEDURE (OUTPATIENT)
Dept: GENERAL RADIOLOGY | Facility: CLINIC | Age: 43
End: 2023-10-03
Attending: ORTHOPAEDIC SURGERY
Payer: COMMERCIAL

## 2023-10-03 ENCOUNTER — PRE VISIT (OUTPATIENT)
Dept: ORTHOPEDICS | Facility: CLINIC | Age: 43
End: 2023-10-03

## 2023-10-03 VITALS — HEIGHT: 65 IN | BODY MASS INDEX: 27.99 KG/M2 | WEIGHT: 168 LBS

## 2023-10-03 DIAGNOSIS — M41.9 SCOLIOSIS, UNSPECIFIED SCOLIOSIS TYPE, UNSPECIFIED SPINAL REGION: ICD-10-CM

## 2023-10-03 PROCEDURE — 72082 X-RAY EXAM ENTIRE SPI 2/3 VW: CPT | Performed by: STUDENT IN AN ORGANIZED HEALTH CARE EDUCATION/TRAINING PROGRAM

## 2023-10-03 PROCEDURE — 99213 OFFICE O/P EST LOW 20 MIN: CPT | Performed by: ORTHOPAEDIC SURGERY

## 2023-10-03 PROCEDURE — 77073 BONE LENGTH STUDIES: CPT | Performed by: STUDENT IN AN ORGANIZED HEALTH CARE EDUCATION/TRAINING PROGRAM

## 2023-10-03 NOTE — LETTER
10/3/2023         RE: Ana Cleveland  [address] [address]        Dear Colleague,    Thank you for referring your patient, Ana Cleveland, to the [facility] [place]. Please see a copy of my visit note below.    Spine Surgery Consultation    REFERRING PHYSICIAN: Sherry Cuenca   PRIMARY CARE PHYSICIAN: Tyesha Anthony           Chief Complaint:   Consult (New patient consult for scoliosis.  Dx 4 years ago while pregnant, physicians discovered curvature while placing epidural.  C/O mid-back pain since her pregnancy.)      History of Present Illness:  Symptom Profile Including: location of symptoms, onset, severity, exacerbating/alleviating factors, previous treatments:        Ana Cleveland is a 42 year old female who presents today for evaluation of.  She was never treated as a child and never needed any bracing or injections or other interventions.  She saw my partner Dr. Jordan last February.  Since that visit she has had some ups and downs but right now is dealing with mid thoracic back pain.  She feels like when she sits for any period of time she will get severe pain in that area and it makes it difficult to work and get through her day.  She is interested in physical therapy.  She sometimes take Tylenol and anti-inflammatories for this.           Past Medical History:     Past Medical History:   Diagnosis Date    Fibroadenoma of breast, left     bx  benign has clips in place     History of blood transfusion     Received after my , Dec. 2019    Psoriasis             Past Surgical History:     Past Surgical History:   Procedure Laterality Date     SECTION N/A 2019    Procedure:  SECTION;  Surgeon: Tyesha Anthony MD;  Location:  L+D    DENTAL SURGERY      [place] teeth    DILATION AND CURETTAGE SUCTION N/A 2018    Procedure: DILATION AND CURETTAGE SUCTION   (12 WEEKS);  Surgeon: Tyesha Anthony  "MD Libby;  Location: [facility]    US BREAST BIOPSY LT              Social History:     Social History     Tobacco Use    Smoking status: Former     Packs/day: 0.50     Years: 10.00     Pack years: 5.00     Types: Cigarettes     Start date: 2002     Quit date: 2012     Years since quittin.7    Smokeless tobacco: Never   Substance Use Topics    Alcohol use: Yes     Comment: 3-6 drinks per week            Family History:     Family History   Problem Relation Age of Onset    Skin Cancer Mother         basal, squamous cell     Skin Cancer Father         melanoma , squamous cell and basal cell     Hyperlipidemia Father     Skin Cancer Maternal Grandmother     Skin Cancer Maternal Grandfather     Other Cancer Maternal Grandfather          of stomach cancer in his early 50s.    Skin Cancer Paternal Grandmother     Glaucoma Brother         dx  at birth  glaucoma  Sturge Wildomar syndrome       Neurofibromatosis Nephew         dx as a baby  brain tumors surgery age 3     Other - See Comments Niece 11        Graves Diesase    Hyperlipidemia Sister     Breast Cancer No family hx of     Colon Cancer No family hx of             Allergies:     Allergies   Allergen Reactions    Benzonatate Visual Disturbance     Saw flashing lights and nausea and headaches            Medications:     Current Outpatient Medications   Medication    ibuprofen (ADVIL/MOTRIN) 100 MG tablet     No current facility-administered medications for this visit.             Review of Systems:     A 10 point ROS was performed and reviewed. Specific responses to these questions are noted at the end of the document.         Physical Exam:   Vitals: Ht 1.64 m (5' 4.57\")   Wt 76.2 kg (168 lb)   BMI 28.33 kg/m    Constitutional: awake, alert, cooperative, no apparent distress, appears stated age.    Eyes: The sclera are white.  Ears, Nose, Throat: The trachea is midline.  Psychiatric: The patient has a normal affect.  Respiratory: breathing " non-labored  Cardiovascular: The extremities are warm and perfused.  Skin: no obvious rashes or lesions.  Musculoskeletal, Neurologic, and Spine:          Lumbar Spine:    Appearance - No gross stepoffs or deformities    Motor -     L2-3: Hip flexion 5/5 R and 5/5 L strength          L3/4:  Knee extension R 5/5 and L 5/5 strength         L4/5:  Foot dorsiflexion R 5/5 L 5/5 and                S1:  Plantarflexion/Peroneal Muscles  R 5/5 and L 5/5 strength    Sensation: intact to light touch L3-S1 distribution BLE      Neurologic:      REFLEXES Left Right                  Patella 1+ 1+   Ankle jerk 1+ 1+   Babinski No upgoing great toe No upgoing great toe   Clonus 0 beats 0 beats     Hip Exam:  No pain with hip log roll and no tenderness over the greater trochanters.    Alignment:  Patient stands with a neutral standing sagittal balance.           Imaging:   We ordered and independently reviewed new radiographs at this clinic visit. The results were discussed with the patient.  Findings include:    Radiographs show mild 16 degree lumbar curvature unchanged from previous February films             Assessment and Plan:   Assessment:  42 year old female with midthoracic back pain, stable scoliosis     Plan:  No indication for surgery.  I recommended referral to physical therapy for periscapular stabilizing strengthening and thoracic paraspinal muscular exercises focused on extension type exercises and rotator cuff strengthening and rehabilitation through the periscapular and trapezial muscles as I think this will help with her mid thoracic back pain.  If things not improved she could contact her primary care doctor and consider anti-inflammatories or muscle relaxant.  If she wishes to pursue injections could consider referral to pain clinic.  I do not think any surgery is indicated at this time.      Respectfully,  Ghassan Knight MD  Spine Surgery  [facility]  Answers submitted by the patient for  this visit:  Symptoms you have experienced in the last 30 days (Submitted on 9/26/2023)  General Symptoms: No  Skin Symptoms: No  HENT Symptoms: No  EYE SYMPTOMS: No  HEART SYMPTOMS: No  LUNG SYMPTOMS: No  INTESTINAL SYMPTOMS: No  URINARY SYMPTOMS: No  GYNECOLOGIC SYMPTOMS: No  BREAST SYMPTOMS: No  SKELETAL SYMPTOMS: Yes  BLOOD SYMPTOMS: No  NERVOUS SYSTEM SYMPTOMS: Yes  MENTAL HEALTH SYMPTOMS: No  Please answer the questions below to tell us what condition you are experiencing: (Submitted on 9/26/2023)  Back pain: Yes  Muscle aches: No  Neck pain: No  Swollen joints: No  Joint pain: No  Bone pain: No  Muscle cramps: No  Muscle weakness: No  Joint stiffness: No  Bone fracture: No  Please answer the questions below to tell us what condition you are experiencing: (Submitted on 9/26/2023)  Trouble with coordination: No  Dizziness or trouble with balance: No  Fainting or black-out spells: No  Memory loss: No  Headache: No  Seizures: No  Speech problems: No  Tingling: Yes  Tremor: No  Weakness: No  Difficulty walking: No  Paralysis: No  Numbness: Yes

## 2023-10-03 NOTE — NURSING NOTE
"Reason For Visit:   Chief Complaint   Patient presents with    Consult     New patient consult for scoliosis.  Dx 4 years ago while pregnant, physicians discovered curvature while placing epidural.  C/O mid-back pain since her pregnancy.       Primary MD: Tyesha Anthony  Ref. MD: Sherry Cuenca PA-C    ?  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  Work status?  Full time.  Date of injury: NA  Type of injury: Chronic.  Date of surgery: NA  Type of surgery: NA.  Smoker: No  Request smoking cessation information: No    Ht 1.64 m (5' 4.57\")   Wt 76.2 kg (168 lb)   BMI 28.33 kg/m      Pain Assessment  Patient Currently in Pain: Yes  0-10 Pain Scale: 5  Primary Pain Location: Back    Oswestry (JENISE) Questionnaire        9/26/2023    10:45 AM   OSWESTRY DISABILITY INDEX   Count 9   Sum 6   Oswestry Score (%) 13.33 %            Neck Disability Index (NDI) Questionnaire         No data to display                       Visual Analog Pain Scale  Back Pain Scale 0-10: 5  Right leg pain: 0  Left leg pain: 0  Neck Pain Scale 0-10: 0  Right arm pain: 0  Left arm pain: 0    Promis 10 Assessment        9/26/2023    10:51 AM   PROMIS 10   In general, would you say your health is: Very good   In general, would you say your quality of life is: Very good   In general, how would you rate your physical health? Very good   In general, how would you rate your mental health, including your mood and your ability to think? Very good   In general, how would you rate your satisfaction with your social activities and relationships? Excellent   In general, please rate how well you carry out your usual social activities and roles Very good   To what extent are you able to carry out your everyday physical activities such as walking, climbing stairs, carrying groceries, or moving a chair? Completely   In the past 7 days, how often have you been bothered by emotional problems such as feeling anxious, depressed, or " irritable? Sometimes   In the past 7 days, how would you rate your fatigue on average? Mild   In the past 7 days, how would you rate your pain on average, where 0 means no pain, and 10 means worst imaginable pain? 6   In general, would you say your health is: 4   In general, would you say your quality of life is: 4   In general, how would you rate your physical health? 4   In general, how would you rate your mental health, including your mood and your ability to think? 4   In general, how would you rate your satisfaction with your social activities and relationships? 5   In general, please rate how well you carry out your usual social activities and roles. (This includes activities at home, at work and in your community, and responsibilities as a parent, child, spouse, employee, friend, etc.) 4   To what extent are you able to carry out your everyday physical activities such as walking, climbing stairs, carrying groceries, or moving a chair? 5   In the past 7 days, how often have you been bothered by emotional problems such as feeling anxious, depressed, or irritable? 3   In the past 7 days, how would you rate your fatigue on average? 2   In the past 7 days, how would you rate your pain on average, where 0 means no pain, and 10 means worst imaginable pain? 6   Global Mental Health Score 16   Global Physical Health Score 16   PROMIS TOTAL - SUBSCORES 32                Johnie Stallworth ATC

## 2023-10-03 NOTE — PROGRESS NOTES
Spine Surgery Consultation    REFERRING PHYSICIAN: Sherry Cuenca   PRIMARY CARE PHYSICIAN: Tyesha Anthony           Chief Complaint:   Consult (New patient consult for scoliosis.  Dx 4 years ago while pregnant, physicians discovered curvature while placing epidural.  C/O mid-back pain since her pregnancy.)      History of Present Illness:  Symptom Profile Including: location of symptoms, onset, severity, exacerbating/alleviating factors, previous treatments:        Ana Cleveland is a 42 year old female who presents today for evaluation of.  She was never treated as a child and never needed any bracing or injections or other interventions.  She saw my partner Dr. Jordan last February.  Since that visit she has had some ups and downs but right now is dealing with mid thoracic back pain.  She feels like when she sits for any period of time she will get severe pain in that area and it makes it difficult to work and get through her day.  She is interested in physical therapy.  She sometimes take Tylenol and anti-inflammatories for this.           Past Medical History:     Past Medical History:   Diagnosis Date    Fibroadenoma of breast, left     bx  benign has clips in place     History of blood transfusion     Received after my , Dec. 2019    Psoriasis             Past Surgical History:     Past Surgical History:   Procedure Laterality Date     SECTION N/A 2019    Procedure:  SECTION;  Surgeon: Tyesha Anthony MD;  [facility]    DENTAL SURGERY      [place] teeth    DILATION AND CURETTAGE SUCTION N/A 2018    Procedure: DILATION AND CURETTAGE SUCTION   (12 WEEKS);  Surgeon: Tyesha Anthony MD;  Location:  OR    US BREAST BIOPSY LT              Social History:     Social History     Tobacco Use    Smoking status: Former     Packs/day: 0.50     Years: 10.00     Pack years: 5.00     Types: Cigarettes     Start date: 2002     Quit date:  "2012     Years since quittin.7    Smokeless tobacco: Never   Substance Use Topics    Alcohol use: Yes     Comment: 3-6 drinks per week            Family History:     Family History   Problem Relation Age of Onset    Skin Cancer Mother         basal, squamous cell     Skin Cancer Father         melanoma , squamous cell and basal cell     Hyperlipidemia Father     Skin Cancer Maternal Grandmother     Skin Cancer Maternal Grandfather     Other Cancer Maternal Grandfather          of stomach cancer in his early 50s.    Skin Cancer Paternal Grandmother     Glaucoma Brother         dx  at birth  glaucoma  [facility] [place] syndrome       Neurofibromatosis Nephew         dx as a baby  brain tumors surgery age 3     Other - See Comments Niece 11        Graves Diesase    Hyperlipidemia Sister     Breast Cancer No family hx of     Colon Cancer No family hx of             Allergies:     Allergies   Allergen Reactions    Benzonatate Visual Disturbance     Saw flashing lights and nausea and headaches            Medications:     Current Outpatient Medications   Medication    ibuprofen (ADVIL/MOTRIN) 100 MG tablet     No current facility-administered medications for this visit.             Review of Systems:     A 10 point ROS was performed and reviewed. Specific responses to these questions are noted at the end of the document.         Physical Exam:   Vitals: Ht 1.64 m (5' 4.57\")   Wt 76.2 kg (168 lb)   BMI 28.33 kg/m    Constitutional: awake, alert, cooperative, no apparent distress, appears stated age.    Eyes: The sclera are white.  Ears, Nose, Throat: The trachea is midline.  Psychiatric: The patient has a normal affect.  Respiratory: breathing non-labored  Cardiovascular: The extremities are warm and perfused.  Skin: no obvious rashes or lesions.  Musculoskeletal, Neurologic, and Spine:          Lumbar Spine:    Appearance - No gross stepoffs or deformities    Motor -     L2-3: Hip flexion 5/5 R and 5/5 L " strength          L3/4:  Knee extension R 5/5 and L 5/5 strength         L4/5:  Foot dorsiflexion R 5/5 L 5/5 and                S1:  Plantarflexion/Peroneal Muscles  R 5/5 and L 5/5 strength    Sensation: intact to light touch L3-S1 distribution BLE      Neurologic:      REFLEXES Left Right                  Patella 1+ 1+   Ankle jerk 1+ 1+   Babinski No upgoing great toe No upgoing great toe   Clonus 0 beats 0 beats     Hip Exam:  No pain with hip log roll and no tenderness over the greater trochanters.    Alignment:  Patient stands with a neutral standing sagittal balance.           Imaging:   We ordered and independently reviewed new radiographs at this clinic visit. The results were discussed with the patient.  Findings include:    Radiographs show mild 16 degree lumbar curvature unchanged from previous February films             Assessment and Plan:   Assessment:  42 year old female with midthoracic back pain, stable scoliosis     Plan:  No indication for surgery.  I recommended referral to physical therapy for periscapular stabilizing strengthening and thoracic paraspinal muscular exercises focused on extension type exercises and rotator cuff strengthening and rehabilitation through the periscapular and trapezial muscles as I think this will help with her mid thoracic back pain.  If things not improved she could contact her primary care doctor and consider anti-inflammatories or muscle relaxant.  If she wishes to pursue injections could consider referral to pain clinic.  I do not think any surgery is indicated at this time.      Respectfully,  Ghassan Knight MD  Spine Surgery  [facility]    Answers submitted by the patient for this visit:  Symptoms you have experienced in the last 30 days (Submitted on 9/26/2023)  General Symptoms: No  Skin Symptoms: No  HENT Symptoms: No  EYE SYMPTOMS: No  HEART SYMPTOMS: No  LUNG SYMPTOMS: No  INTESTINAL SYMPTOMS: No  URINARY SYMPTOMS: No  GYNECOLOGIC  SYMPTOMS: No  BREAST SYMPTOMS: No  SKELETAL SYMPTOMS: Yes  BLOOD SYMPTOMS: No  NERVOUS SYSTEM SYMPTOMS: Yes  MENTAL HEALTH SYMPTOMS: No  Please answer the questions below to tell us what condition you are experiencing: (Submitted on 9/26/2023)  Back pain: Yes  Muscle aches: No  Neck pain: No  Swollen joints: No  Joint pain: No  Bone pain: No  Muscle cramps: No  Muscle weakness: No  Joint stiffness: No  Bone fracture: No  Please answer the questions below to tell us what condition you are experiencing: (Submitted on 9/26/2023)  Trouble with coordination: No  Dizziness or trouble with balance: No  Fainting or black-out spells: No  Memory loss: No  Headache: No  Seizures: No  Speech problems: No  Tingling: Yes  Tremor: No  Weakness: No  Difficulty walking: No  Paralysis: No  Numbness: Yes

## 2023-10-19 ENCOUNTER — THERAPY VISIT (OUTPATIENT)
Dept: PHYSICAL THERAPY | Facility: CLINIC | Age: 43
End: 2023-10-19
Attending: ORTHOPAEDIC SURGERY
Payer: COMMERCIAL

## 2023-10-19 DIAGNOSIS — M41.9 SCOLIOSIS, UNSPECIFIED SCOLIOSIS TYPE, UNSPECIFIED SPINAL REGION: ICD-10-CM

## 2023-10-19 DIAGNOSIS — M54.6 CHRONIC BILATERAL THORACIC BACK PAIN: Primary | ICD-10-CM

## 2023-10-19 DIAGNOSIS — G89.29 CHRONIC BILATERAL THORACIC BACK PAIN: Primary | ICD-10-CM

## 2023-10-19 PROCEDURE — 97161 PT EVAL LOW COMPLEX 20 MIN: CPT | Mod: GP | Performed by: PHYSICAL THERAPIST

## 2023-10-19 PROCEDURE — 97110 THERAPEUTIC EXERCISES: CPT | Mod: GP | Performed by: PHYSICAL THERAPIST

## 2023-10-19 NOTE — PROGRESS NOTES
PHYSICAL THERAPY EVALUATION  Type of Visit: Evaluation    See electronic medical record for Abuse and Falls Screening details.    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 Midback pain especially with sitting.  Date of onset: 10/03/23    Relevant medical history:     Dates & types of surgery: : 21, D&C 2018    Prior diagnostic imaging/testing results: X-ray     Prior therapy history for the same diagnosis, illness or injury: No      Prior Level of Function  Transfers:   Ambulation:   ADL:   IADL:     Living Environment  Social support: With a significant other or spouse   Type of home: House   Stairs to enter the home: Yes 30 Is there a railing: Yes   Ramp: No   Stairs inside the home: Yes 30 Is there a railing: Yes   Help at home: None  Equipment owned:       Employment: Yes   Hobbies/Interests:      Patient goals for therapy: Sit comfortably for extended periods of time    Pain assessment: See objective evaluation for additional pain details     Objective   CERVICAL/THORACIC SPINE EVALUATION  PAIN: Pain Level at Best: 0/10  Pain Level at Worst: 4-6/10  Pain Location: thoracic spine  Pain Quality: Aching and tight  Pain Frequency: intermittent  Pain is Exacerbated By: prolonged sitting especially with poor posture  Pain is Relieved By: moving around some, theragun   Pain Progression: slightly worsened  INTEGUMENTARY (edema, incisions):   POSTURE: Sitting Posture: Rounded shoulders, Forward head. There is a mild  convexed left curvature of thoracolumbar/lumbar spine with apex at L1-L2.   GAIT:   Weightbearing Status:   Assistive Device(s):   Gait Deviations:   BALANCE/PROPRIOCEPTION:   WEIGHTBEARING ALIGNMENT:   ROM:   (Degrees) Left AROM Right AROM    Cervical Flexion     Cervical Extension     Cervical Side bend      Cervical Rotation      Cervical Protrusion     Cervical Retraction      Thoracic Flexion WNL    Thoracic Extension Mod loss, hypomobile    Thoracic Rotation       Left AROM Left PROM Right AROM Right PROM   Shoulder Flexion All shoulder AROM WNL and pain free today      Shoulder Extension       Shoulder Abduction       Shoulder Adduction       Shoulder IR       Shoulder ER       Shoulder Horiz Abduction       Shoulder Horiz Adduction       Pain:   End Feel:     MYOTOMES: WNL  DTR S:   CORD SIGNS:   DERMATOMES:   NEURAL TENSION:   FLEXIBILITY:    SPECIAL TESTS:   PALPATION:  rhomboids, mid and upper trapezius, levators  SPINAL SEGMENTAL CONCLUSIONS:  hypo T3-6 primarily      Assessment & Plan   CLINICAL IMPRESSIONS  Medical Diagnosis: Scoliosis, thoracic pain    Treatment Diagnosis: Chronic bilateral thoracic back pain   Impression/Assessment: Patient is a 42 year old female with midback complaints.  The following significant findings have been identified: Pain, Decreased ROM/flexibility, Decreased joint mobility, Decreased strength, Impaired muscle performance, Decreased activity tolerance, and Impaired posture. These impairments interfere with their ability to perform work tasks and household chores as compared to previous level of function.     Clinical Decision Making (Complexity):  Clinical Presentation: Stable/Uncomplicated  Clinical Presentation Rationale: based on medical and personal factors listed in PT evaluation  Clinical Decision Making (Complexity): Low complexity    PLAN OF CARE  Treatment Interventions:  Interventions: Manual Therapy, Neuromuscular Re-education, Therapeutic Activity, Therapeutic Exercise, Self-Care/Home Management    Long Term Goals     PT Goal 1  Goal Identifier: LTG 1  Goal Description: Patient will be able to sit for 2-3 hours at a time throughout work day with 2/10 pain at worst by the end of the day  Rationale: to maximize safety and independence with performance of ADLs and functional tasks  Target Date: 11/30/23      Frequency of Treatment:  1x/week  Duration of Treatment: 6 visits    Recommended Referrals to Other Professionals:   Education Assessment:   Learner/Method: No Barriers to Learning;Pictures/Video;Demonstration;Reading;Listening;Patient    Risks and benefits of evaluation/treatment have been explained.   Patient/Family/caregiver agrees with Plan of Care.     Evaluation Time:     PT Eval, Low Complexity Minutes (50473): 18       Signing Clinician: Bello Messer PT

## 2023-11-19 ENCOUNTER — E-VISIT (OUTPATIENT)
Dept: URGENT CARE | Facility: CLINIC | Age: 43
End: 2023-11-19
Payer: COMMERCIAL

## 2023-11-19 ENCOUNTER — VIRTUAL VISIT (OUTPATIENT)
Dept: URGENT CARE | Facility: CLINIC | Age: 43
End: 2023-11-19
Payer: COMMERCIAL

## 2023-11-19 DIAGNOSIS — J01.90 ACUTE BACTERIAL SINUSITIS: Primary | ICD-10-CM

## 2023-11-19 DIAGNOSIS — R05.9 COUGH, UNSPECIFIED TYPE: Primary | ICD-10-CM

## 2023-11-19 DIAGNOSIS — B96.89 ACUTE BACTERIAL SINUSITIS: Primary | ICD-10-CM

## 2023-11-19 PROCEDURE — 99207 PR NON-BILLABLE SERV PER CHARTING: CPT | Performed by: FAMILY MEDICINE

## 2023-11-19 PROCEDURE — 99441 PR PHYSICIAN TELEPHONE EVALUATION 5-10 MIN: CPT

## 2023-11-19 NOTE — PROGRESS NOTES
CC: Worsening cough x 3 weeks    Cough x 3 weeks.  More congested, productive.  No SOB.  Increased sinus pain and congestion with LEFT ear clogged this past week    No fever or chills.    1. Acute bacterial sinusitis    - amoxicillin-clavulanate (AUGMENTIN) 875-125 MG tablet; Take 1 tablet by mouth 2 times daily for 10 days  Dispense: 20 tablet; Refill: 0     Will treat with Augmentin.  Recommend saline nasal spray as well.  Close Follow-up if no change or new or worsening sx prn.    Carisa Valdes MD  [facility] [place]    Phone call duration #7 minutes.

## 2023-11-19 NOTE — PATIENT INSTRUCTIONS
Dear Ana Cleveland,    We are sorry you are not feeling well. Based on the responses you provided, it is recommended that you be seen in-person in urgent care so we can better evaluate your symptoms. Please click here to find the nearest urgent care location to you.   You will not be charged for this Visit. Thank you for trusting us with your care.    Mariah Valdes MD

## 2024-01-29 ASSESSMENT — ENCOUNTER SYMPTOMS
DYSURIA: 0
FEVER: 0
ABDOMINAL PAIN: 0
SHORTNESS OF BREATH: 0
HEADACHES: 0
DIARRHEA: 1
HEARTBURN: 0
BREAST MASS: 0
CHILLS: 0
MYALGIAS: 0
CONSTIPATION: 0
DIZZINESS: 0
HEMATURIA: 0
FREQUENCY: 0
ARTHRALGIAS: 0
JOINT SWELLING: 0
NAUSEA: 0
SORE THROAT: 0
COUGH: 0
PALPITATIONS: 0
NERVOUS/ANXIOUS: 0
HEMATOCHEZIA: 0
WEAKNESS: 0
EYE PAIN: 0
PARESTHESIAS: 0

## 2024-01-30 ENCOUNTER — OFFICE VISIT (OUTPATIENT)
Dept: FAMILY MEDICINE | Facility: CLINIC | Age: 44
End: 2024-01-30
Payer: COMMERCIAL

## 2024-01-30 VITALS
OXYGEN SATURATION: 97 % | BODY MASS INDEX: 28.85 KG/M2 | RESPIRATION RATE: 16 BRPM | TEMPERATURE: 98.4 F | SYSTOLIC BLOOD PRESSURE: 118 MMHG | HEIGHT: 64 IN | HEART RATE: 77 BPM | WEIGHT: 169 LBS | DIASTOLIC BLOOD PRESSURE: 76 MMHG

## 2024-01-30 DIAGNOSIS — Z13.6 CARDIOVASCULAR SCREENING; LDL GOAL LESS THAN 160: ICD-10-CM

## 2024-01-30 DIAGNOSIS — Z12.31 VISIT FOR SCREENING MAMMOGRAM: ICD-10-CM

## 2024-01-30 DIAGNOSIS — Z00.00 ROUTINE GENERAL MEDICAL EXAMINATION AT A HEALTH CARE FACILITY: Primary | ICD-10-CM

## 2024-01-30 DIAGNOSIS — Z13.1 SCREENING FOR DIABETES MELLITUS: ICD-10-CM

## 2024-01-30 DIAGNOSIS — Z13.29 SCREENING FOR THYROID DISORDER: ICD-10-CM

## 2024-01-30 DIAGNOSIS — Z13.0 SCREENING, ANEMIA, DEFICIENCY, IRON: ICD-10-CM

## 2024-01-30 LAB
ALBUMIN SERPL BCG-MCNC: 4.3 G/DL (ref 3.5–5.2)
ALP SERPL-CCNC: 51 U/L (ref 40–150)
ALT SERPL W P-5'-P-CCNC: 7 U/L (ref 0–50)
ANION GAP SERPL CALCULATED.3IONS-SCNC: 10 MMOL/L (ref 7–15)
AST SERPL W P-5'-P-CCNC: 14 U/L (ref 0–45)
BASOPHILS # BLD AUTO: 0 10E3/UL (ref 0–0.2)
BASOPHILS NFR BLD AUTO: 0 %
BILIRUB SERPL-MCNC: 0.3 MG/DL
BUN SERPL-MCNC: 9.1 MG/DL (ref 6–20)
CALCIUM SERPL-MCNC: 9.3 MG/DL (ref 8.6–10)
CHLORIDE SERPL-SCNC: 106 MMOL/L (ref 98–107)
CHOLEST SERPL-MCNC: 211 MG/DL
CREAT SERPL-MCNC: 0.59 MG/DL (ref 0.51–0.95)
DEPRECATED HCO3 PLAS-SCNC: 23 MMOL/L (ref 22–29)
EGFRCR SERPLBLD CKD-EPI 2021: >90 ML/MIN/1.73M2
EOSINOPHIL # BLD AUTO: 0.1 10E3/UL (ref 0–0.7)
EOSINOPHIL NFR BLD AUTO: 2 %
ERYTHROCYTE [DISTWIDTH] IN BLOOD BY AUTOMATED COUNT: 12.3 % (ref 10–15)
FASTING STATUS PATIENT QL REPORTED: YES
GLUCOSE SERPL-MCNC: 90 MG/DL (ref 70–99)
HCT VFR BLD AUTO: 42.6 % (ref 35–47)
HDLC SERPL-MCNC: 51 MG/DL
HGB BLD-MCNC: 14.1 G/DL (ref 11.7–15.7)
IMM GRANULOCYTES # BLD: 0 10E3/UL
IMM GRANULOCYTES NFR BLD: 0 %
LDLC SERPL CALC-MCNC: 139 MG/DL
LYMPHOCYTES # BLD AUTO: 1.7 10E3/UL (ref 0.8–5.3)
LYMPHOCYTES NFR BLD AUTO: 26 %
MCH RBC QN AUTO: 32.1 PG (ref 26.5–33)
MCHC RBC AUTO-ENTMCNC: 33.1 G/DL (ref 31.5–36.5)
MCV RBC AUTO: 97 FL (ref 78–100)
MONOCYTES # BLD AUTO: 0.4 10E3/UL (ref 0–1.3)
MONOCYTES NFR BLD AUTO: 6 %
NEUTROPHILS # BLD AUTO: 4.4 10E3/UL (ref 1.6–8.3)
NEUTROPHILS NFR BLD AUTO: 67 %
NONHDLC SERPL-MCNC: 160 MG/DL
PLATELET # BLD AUTO: 164 10E3/UL (ref 150–450)
POTASSIUM SERPL-SCNC: 4.2 MMOL/L (ref 3.4–5.3)
PROT SERPL-MCNC: 7.1 G/DL (ref 6.4–8.3)
RBC # BLD AUTO: 4.39 10E6/UL (ref 3.8–5.2)
SODIUM SERPL-SCNC: 139 MMOL/L (ref 135–145)
TRIGL SERPL-MCNC: 105 MG/DL
TSH SERPL DL<=0.005 MIU/L-ACNC: 2.52 UIU/ML (ref 0.3–4.2)
WBC # BLD AUTO: 6.6 10E3/UL (ref 4–11)

## 2024-01-30 PROCEDURE — 99396 PREV VISIT EST AGE 40-64: CPT | Performed by: PHYSICIAN ASSISTANT

## 2024-01-30 PROCEDURE — 84443 ASSAY THYROID STIM HORMONE: CPT | Performed by: PHYSICIAN ASSISTANT

## 2024-01-30 PROCEDURE — 80053 COMPREHEN METABOLIC PANEL: CPT | Performed by: PHYSICIAN ASSISTANT

## 2024-01-30 PROCEDURE — 36415 COLL VENOUS BLD VENIPUNCTURE: CPT | Performed by: PHYSICIAN ASSISTANT

## 2024-01-30 PROCEDURE — 80061 LIPID PANEL: CPT | Performed by: PHYSICIAN ASSISTANT

## 2024-01-30 PROCEDURE — 85025 COMPLETE CBC W/AUTO DIFF WBC: CPT | Performed by: PHYSICIAN ASSISTANT

## 2024-01-30 ASSESSMENT — ENCOUNTER SYMPTOMS
JOINT SWELLING: 0
WEAKNESS: 0
DIZZINESS: 0
PALPITATIONS: 0
EYE PAIN: 0
NAUSEA: 0
SORE THROAT: 0
COUGH: 0
CONSTIPATION: 0
FEVER: 0
HEADACHES: 0
NERVOUS/ANXIOUS: 0
SHORTNESS OF BREATH: 0
HEMATURIA: 0
DYSURIA: 0
DIARRHEA: 1
ABDOMINAL PAIN: 0
MYALGIAS: 0
CHILLS: 0
ARTHRALGIAS: 0
FREQUENCY: 0

## 2024-01-30 ASSESSMENT — ANXIETY QUESTIONNAIRES
8. IF YOU CHECKED OFF ANY PROBLEMS, HOW DIFFICULT HAVE THESE MADE IT FOR YOU TO DO YOUR WORK, TAKE CARE OF THINGS AT HOME, OR GET ALONG WITH OTHER PEOPLE?: NOT DIFFICULT AT ALL
5. BEING SO RESTLESS THAT IT IS HARD TO SIT STILL: NOT AT ALL
4. TROUBLE RELAXING: NOT AT ALL
GAD7 TOTAL SCORE: 2
7. FEELING AFRAID AS IF SOMETHING AWFUL MIGHT HAPPEN: NOT AT ALL
6. BECOMING EASILY ANNOYED OR IRRITABLE: NOT AT ALL
3. WORRYING TOO MUCH ABOUT DIFFERENT THINGS: SEVERAL DAYS
GAD7 TOTAL SCORE: 2
7. FEELING AFRAID AS IF SOMETHING AWFUL MIGHT HAPPEN: NOT AT ALL
1. FEELING NERVOUS, ANXIOUS, OR ON EDGE: SEVERAL DAYS
GAD7 TOTAL SCORE: 2
2. NOT BEING ABLE TO STOP OR CONTROL WORRYING: NOT AT ALL
IF YOU CHECKED OFF ANY PROBLEMS ON THIS QUESTIONNAIRE, HOW DIFFICULT HAVE THESE PROBLEMS MADE IT FOR YOU TO DO YOUR WORK, TAKE CARE OF THINGS AT HOME, OR GET ALONG WITH OTHER PEOPLE: NOT DIFFICULT AT ALL

## 2024-01-30 ASSESSMENT — PATIENT HEALTH QUESTIONNAIRE - PHQ9
SUM OF ALL RESPONSES TO PHQ QUESTIONS 1-9: 0
10. IF YOU CHECKED OFF ANY PROBLEMS, HOW DIFFICULT HAVE THESE PROBLEMS MADE IT FOR YOU TO DO YOUR WORK, TAKE CARE OF THINGS AT HOME, OR GET ALONG WITH OTHER PEOPLE: NOT DIFFICULT AT ALL
SUM OF ALL RESPONSES TO PHQ QUESTIONS 1-9: 0

## 2024-01-30 ASSESSMENT — PAIN SCALES - GENERAL: PAINLEVEL: NO PAIN (0)

## 2024-01-30 NOTE — PROGRESS NOTES
Preventive Care Visit  [facility]  20[facility]       SUBJECTIVE:   Ana is a 43 year old, presenting for the following:  Physical        2024    10:38 AM   Additional Questions   Roomed by sofi delgadillo     Healthy Habits:     Getting at least 3 servings of Calcium per day:  NO    Bi-annual eye exam:  Yes    Dental care twice a year:  Yes    Sleep apnea or symptoms of sleep apnea:  None    Diet:  Regular (no restrictions)    Frequency of exercise:  2-3 days/week    Duration of exercise:  30-45 minutes    Taking medications regularly:  Yes    Medication side effects:  None    Additional concerns today:  Yes    The 10-year ASCVD risk score (Ramesh SCHREIBER, et al., 2019) is: 0.6%    Values used to calculate the score:      Age: 43 years      Sex: Female      Is Non- : No      Diabetic: No      Tobacco smoker: No      Systolic Blood Pressure: 112 mmHg      Is BP treated: No      HDL Cholesterol: 51 mg/dL      Total Cholesterol: 211 mg/dL     Today's PHQ-9 Score:       2024    10:30 AM   PHQ-9 SCORE   PHQ-9 Total Score MyChart 0   PHQ-9 Total Score 0       Social History     Tobacco Use    Smoking status: Former     Packs/day: 0.50     Years: 10.00     Additional pack years: 0.00     Total pack years: 5.00     Types: Cigarettes     Start date: 2002     Quit date: 2012     Years since quittin.0    Smokeless tobacco: Never   Substance Use Topics    Alcohol use: Yes     Comment: 3-6 drinks per week             2024    11:01 AM   Alcohol Use   Prescreen: >3 drinks/day or >7 drinks/week? No     Reviewed orders with patient.  Reviewed health maintenance and updated orders accordingly - Yes  Labs reviewed in EPIC  BP Readings from Last 3 Encounters:   24 118/76   22 109/70   04/15/22 107/72    Wt Readings from Last 3 Encounters:   24 76.7 kg (169 lb)   10/03/23 76.2 kg (168 lb)   22 77.8 kg (171 lb  9.6 oz)                  Patient Active Problem List   Diagnosis    Pap smear abnormality of cervix    Supervision of high-risk pregnancy of elderly multigravida    Encounter for induction of labor    S/P  section    Chronic bilateral thoracic back pain     Past Surgical History:   Procedure Laterality Date     SECTION N/A 2019    Procedure:  SECTION;  Surgeon: Tyesha Anthony MD;  Location:  L+D    DENTAL SURGERY      [place] teeth    DILATION AND CURETTAGE SUCTION N/A 2018    Procedure: DILATION AND CURETTAGE SUCTION   (12 WEEKS);  Surgeon: Tyesha Anthony MD;  Location:  OR    US BREAST BIOPSY LT         Social History     Tobacco Use    Smoking status: Former     Packs/day: 0.50     Years: 10.00     Additional pack years: 0.00     Total pack years: 5.00     Types: Cigarettes     Start date: 2002     Quit date: 2012     Years since quittin.0    Smokeless tobacco: Never   Substance Use Topics    Alcohol use: Yes     Comment: 3-6 drinks per week     Family History   Problem Relation Age of Onset    Skin Cancer Mother         basal, squamous cell     Skin Cancer Father         melanoma , squamous cell and basal cell     Hyperlipidemia Father     Skin Cancer Maternal Grandmother     Skin Cancer Maternal Grandfather     Other Cancer Maternal Grandfather          of stomach cancer in his early 50s.    Skin Cancer Paternal Grandmother     Glaucoma Brother         dx  at birth  glaucoma  [employer] [place] syndrome       Neurofibromatosis Nephew         dx as a baby  brain tumors surgery age 3     Other - See Comments Niece 11        Graves Diesase    Hyperlipidemia Sister     Breast Cancer No family hx of     Colon Cancer No family hx of          Current Outpatient Medications   Medication Sig Dispense Refill    ibuprofen (ADVIL/MOTRIN) 100 MG tablet Take 200 mg by mouth every 4 hours as needed       Allergies   Allergen Reactions    Benzonatate  Visual Disturbance     Saw flashing lights and nausea and headaches     Recent Labs   Lab Test 22  1647 04/15/22  0752 21  1020 19  0656   A1C  --  4.6  --   --    *  --  183*  --    HDL 46*  --  63  --    TRIG 193*  --  109  --    ALT  --  16  --   --    CR  --  0.63  --  0.54   GFRESTIMATED  --  >90  --  >90   GFRESTBLACK  --   --   --  >90   POTASSIUM  --  4.5  --   --    TSH  --  2.78  --   --         Breast Cancer Screening:    FHS-7:       2024    10:31 AM   Breast CA Risk Assessment (FHS-7)   Did any of your first-degree relatives have breast or ovarian cancer? No   Did any of your relatives have bilateral breast cancer? No   Did any man in your family have breast cancer? No   Did any woman in your family have breast and ovarian cancer? No   Did any woman in your family have breast cancer before age 50 y? No   Do you have 2 or more relatives with breast and/or ovarian cancer? No   Do you have 2 or more relatives with breast and/or bowel cancer? No       Pertinent mammograms are reviewed under the imaging tab.    History of abnormal Pap smear: NO - age 30-65 PAP every 5 years with negative HPV co-testing recommended      Latest Ref Rng & Units 2021     9:28 AM   PAP / HPV   PAP  Negative for Intraepithelial Lesion or Malignancy (NILM)    HPV 16 DNA Negative Negative    HPV 18 DNA Negative Negative    Other HR HPV Negative Negative      Reviewed and updated as needed this visit by clinical staff   Tobacco  Allergies  Meds  Problems  Med Hx  Surg Hx  Fam Hx          Reviewed and updated as needed this visit by Provider   Tobacco  Allergies  Meds  Problems  Med Hx  Surg Hx  Fam Hx            Past Medical History:   Diagnosis Date    Fibroadenoma of breast, left     bx  benign has clips in place     History of blood transfusion     Received after my , Dec. 2019    Psoriasis       Past Surgical History:   Procedure Laterality Date     SECTION N/A  "2019    Procedure:  SECTION;  Surgeon: Tyesha Anthony MD;  [facility]    DENTAL SURGERY      [place] teeth    DILATION AND CURETTAGE SUCTION N/A 2018    Procedure: DILATION AND CURETTAGE SUCTION   (12 WEEKS);  Surgeon: Tyesha Anthony MD;  Location: [facility]    US BREAST BIOPSY LT  2016     Review of Systems   Constitutional:  Negative for chills and fever.   HENT:  Negative for congestion, ear pain, hearing loss and sore throat.    Eyes:  Negative for pain and visual disturbance.   Respiratory:  Negative for cough and shortness of breath.    Cardiovascular:  Negative for chest pain and palpitations.   Gastrointestinal:  Positive for diarrhea. Negative for abdominal pain, constipation and nausea.   Genitourinary:  Negative for dysuria, frequency, genital sores, hematuria, pelvic pain, urgency, vaginal bleeding and vaginal discharge.   Musculoskeletal:  Negative for arthralgias, joint swelling and myalgias.   Skin:  Negative for rash.   Neurological:  Negative for dizziness, weakness and headaches.   Psychiatric/Behavioral:  The patient is not nervous/anxious.        OBJECTIVE:   /76   Pulse 77   Temp 98.4  F (36.9  C) (Temporal)   Resp 16   Ht 1.626 m (5' 4\")   Wt 76.7 kg (169 lb)   LMP 2024   SpO2 97%   BMI 29.01 kg/m     Estimated body mass index is 29.01 kg/m  as calculated from the following:    Height as of this encounter: 1.626 m (5' 4\").    Weight as of this encounter: 76.7 kg (169 lb).  Physical Exam  GENERAL: alert and no distress  EYES: Eyes grossly normal to inspection, PERRL and conjunctivae and sclerae normal  HENT: ear canals and TM's normal, nose and mouth without ulcers or lesions  NECK: no adenopathy, no asymmetry, masses, or scars  RESP: lungs clear to auscultation - no rales, rhonchi or wheezes  BREAST: normal without masses, tenderness or nipple discharge and no palpable axillary masses or adenopathy  CV: regular rate and rhythm, " "normal S1 S2, no S3 or S4, no murmur, click or rub, no peripheral edema  ABDOMEN: soft, nontender, no hepatosplenomegaly, no masses and bowel sounds normal  MS: no gross musculoskeletal defects noted, no edema  SKIN: no suspicious lesions or rashes  NEURO: Normal strength and tone, mentation intact and speech normal  PSYCH: mentation appears normal, affect normal/bright  LYMPH: no cervical, supraclavicular, axillary, or inguinal adenopathy    Diagnostic Test Results:  Labs reviewed in Epic    ASSESSMENT/PLAN:     Problem List Items Addressed This Visit    None  Visit Diagnoses       Routine general medical examination at a health care facility    -  Primary    Relevant Orders    Adult Comprehensive Weight Management  Referral    Visit for screening mammogram        Relevant Orders    *MA Screening Digital Bilateral    Screening, anemia, deficiency, iron        Relevant Orders    CBC with Platelets & Differential    Screening for diabetes mellitus        Relevant Orders    Comprehensive metabolic panel    Screening for thyroid disorder        Relevant Orders    TSH with free T4 reflex    CARDIOVASCULAR SCREENING; LDL GOAL LESS THAN 160        Relevant Orders    Lipid panel reflex to direct LDL Fasting            Patient has been advised of split billing requirements and indicates understanding: Yes      Counseling  Reviewed preventive health counseling, as reflected in patient instructions       Regular exercise       Healthy diet/nutrition      BMI  Estimated body mass index is 29.01 kg/m  as calculated from the following:    Height as of this encounter: 1.626 m (5' 4\").    Weight as of this encounter: 76.7 kg (169 lb).   Weight management plan: Discussed healthy diet and exercise guidelines      She reports that she quit smoking about 12 years ago. Her smoking use included cigarettes. She started smoking about 22 years ago. She has a 5 pack-year smoking history. She has never used smokeless " tobacco.          Signed Electronically by: Hammad Alfred PA-C    Answers submitted by the patient for this visit:  Patient Health Questionnaire (Submitted on 1/30/2024)  If you checked off any problems, how difficult have these problems made it for you to do your work, take care of things at home, or get along with other people?: Not difficult at all  PHQ9 TOTAL SCORE: 0  LAUREI-7 (Submitted on 1/30/2024)  LAURIE 7 TOTAL SCORE: 2  Annual Preventive Visit (Submitted on 1/29/2024)  Chief Complaint: Annual Exam:  Blood in stool: No  heartburn: No  peripheral edema: No  mood changes: No  Skin sensation changes: No  tenderness: No  breast mass: No  breast discharge: No

## 2024-02-01 NOTE — RESULT ENCOUNTER NOTE
"Jama Perez  Your attached labs are normal. Keep up the good work!  Please contact the office with any questions or concerns.    Mandy Guzman \"Hammad\" BENNY Alfred"

## 2024-02-03 ENCOUNTER — HEALTH MAINTENANCE LETTER (OUTPATIENT)
Age: 44
End: 2024-02-03

## 2024-02-14 ENCOUNTER — ANCILLARY PROCEDURE (OUTPATIENT)
Dept: MAMMOGRAPHY | Facility: CLINIC | Age: 44
End: 2024-02-14
Attending: OBSTETRICS & GYNECOLOGY
Payer: COMMERCIAL

## 2024-02-14 DIAGNOSIS — Z12.31 VISIT FOR SCREENING MAMMOGRAM: ICD-10-CM

## 2024-02-14 PROCEDURE — 77063 BREAST TOMOSYNTHESIS BI: CPT | Mod: TC | Performed by: RADIOLOGY

## 2024-02-14 PROCEDURE — 77067 SCR MAMMO BI INCL CAD: CPT | Mod: TC | Performed by: RADIOLOGY

## 2024-05-08 ASSESSMENT — SLEEP AND FATIGUE QUESTIONNAIRES
HOW LIKELY ARE YOU TO NOD OFF OR FALL ASLEEP WHILE SITTING AND READING: WOULD NEVER DOZE
HOW LIKELY ARE YOU TO NOD OFF OR FALL ASLEEP WHILE SITTING AND TALKING TO SOMEONE: WOULD NEVER DOZE
HOW LIKELY ARE YOU TO NOD OFF OR FALL ASLEEP IN A CAR, WHILE STOPPED FOR A FEW MINUTES IN TRAFFIC: WOULD NEVER DOZE
HOW LIKELY ARE YOU TO NOD OFF OR FALL ASLEEP WHILE SITTING INACTIVE IN A PUBLIC PLACE: WOULD NEVER DOZE
HOW LIKELY ARE YOU TO NOD OFF OR FALL ASLEEP WHILE SITTING QUIETLY AFTER LUNCH WITHOUT ALCOHOL: WOULD NEVER DOZE
HOW LIKELY ARE YOU TO NOD OFF OR FALL ASLEEP WHILE LYING DOWN TO REST IN THE AFTERNOON WHEN CIRCUMSTANCES PERMIT: WOULD NEVER DOZE
HOW LIKELY ARE YOU TO NOD OFF OR FALL ASLEEP WHILE WATCHING TV: SLIGHT CHANCE OF DOZING
HOW LIKELY ARE YOU TO NOD OFF OR FALL ASLEEP WHEN YOU ARE A PASSENGER IN A CAR FOR AN HOUR WITHOUT A BREAK: WOULD NEVER DOZE
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
## 2024-05-15 ENCOUNTER — OFFICE VISIT (OUTPATIENT)
Dept: SURGERY | Facility: CLINIC | Age: 44
End: 2024-05-15
Payer: COMMERCIAL

## 2024-05-15 VITALS
HEART RATE: 85 BPM | WEIGHT: 169 LBS | BODY MASS INDEX: 28.85 KG/M2 | SYSTOLIC BLOOD PRESSURE: 112 MMHG | HEIGHT: 64 IN | OXYGEN SATURATION: 96 % | RESPIRATION RATE: 16 BRPM | DIASTOLIC BLOOD PRESSURE: 79 MMHG

## 2024-05-15 DIAGNOSIS — G89.29 CHRONIC BILATERAL THORACIC BACK PAIN: ICD-10-CM

## 2024-05-15 DIAGNOSIS — Z00.00 ROUTINE GENERAL MEDICAL EXAMINATION AT A HEALTH CARE FACILITY: ICD-10-CM

## 2024-05-15 DIAGNOSIS — E66.3 OVERWEIGHT (BMI 25.0-29.9): Primary | ICD-10-CM

## 2024-05-15 DIAGNOSIS — M54.6 CHRONIC BILATERAL THORACIC BACK PAIN: ICD-10-CM

## 2024-05-15 PROCEDURE — 99205 OFFICE O/P NEW HI 60 MIN: CPT | Performed by: PHYSICIAN ASSISTANT

## 2024-05-15 NOTE — Clinical Note
Good day Mandy,  Seeing Ana for weight management. With her BMI at 29, hoping to add any metabolic medical co morbidities for criteria of utilizing medications. She listed high cholesterol on her questionnaire but it's not a formal prior dx on her chart. Reviewing labs - I can see lipids abnormal the past few years (more so the past couple years), does she meet criteria for a cholesterol/dyslipidemia dx? I don't primarily manage/monitor those and last comment from you was that it was 'normal' so wanted to check.  Thanks!  Estrellita Fragoso, PADiomedesC

## 2024-05-15 NOTE — ASSESSMENT & PLAN NOTE
Initial MWM visit. Referred to dietitian. Reviewed labs - could update hgA1c to check for preDM so ordered. Discussed at length GLP/GIPa meds - this is her main interest. Reports insurance coverage w/back pain comorbidity. Will also run by PCP given prior cholesterol numbers to see if that's another medical comorbidity that could be added. Discussed not FDA approved [facility] compounded semaglutide as well and she may be interested if insurance does not cover other options.

## 2024-05-15 NOTE — PATIENT INSTRUCTIONS
Nice to talk with you today. Below is the plan discussed.-  Estrellita Fragoso PA-C   Pt Instructions:  Labs ordered.  Please call [phone] to set up a lab appt. Just to check hgA1c to screen for pre-diabetes.   Schedule with the dietitian.   Can double check with injectable coverage for Wegovy, Saxenda, or Zepbound. If covered, clarify if specific requirements (or specifically associated medical conditions - they included back pain as well as an associated medical conditions). Can send a [employer] message w/confirmation.     For Zepbound (is on shortage currently) if not covered: Here is the link for the  website to apply for a savings card:  Savings Card, Cost & Coverage Support  Zepbound (tirzepatide) (riaz.com)       Goals:  I recommend eating breakfast within an hour of waking up and eating every 4-6 hours during the day and try minimize snacking between meals. Prioritizing veggies and proteins for food choices is also recommended as medically appropriate.  Recommend 64oz (2L) water daily as medically appropriate (6-8 glasses)  Great job with exercising - please continue to invest in yourself with regular exercise. Continue to strive for a range for 150-300 minutes of exercise for weight maintenance and incorporating strength training twice a week to help preserve muscle!      Follow up:    Call [phone] to schedule next visit in 3-4 months    WEGOVY (semaglutide)      Wegovy (semaglutide) injection 2.4 mg is an injectable prescription medicine used for adults with obesity (BMI ?30) or overweight (excess weight) (BMI ?27) who also have weight-related medical problems to help them lose weight and keep the weight off.  It is a GLP-1 agonist medication. GLP1 agonists stimulate the hormone GLP-1 in your body to allow you to feel full quickly and stay full longer.    Due to the shortage, You may need to be persistent and patient to get these initial dosages due to the shortage.  Once you are able to  "obtain the 0.25 and 0.5 mg dose \"8 weeks of therapy\" you can begin treatment.     Directions:  Start Wegovy (semaglutide) 0.25 mg once weekly for 4 weeks, then if tolerating increase to 0.5 mg weekly for 4 weeks, then if tolerating increase to 1 mg weekly for 4 weeks, then if tolerating increase to 1.7 mg weekly for 4 weeks, then if tolerating increase to 2.4 mg weekly thereafter.      -Each Wegovy pen is a once weekly single-dose prefilled pen with a pen injector already built within the pen. Discard the Wegovy pen after use in sharps container.     Common Side Effects:    nausea, headache, diarrhea, stomach upset.  If these become unmanageable call or mychart.    Serious Side effects:   Pancreatitis (inflammation of the pancreas) has been associated with this type of medication, but is very rare.Symptoms of pancreatitis include: Pain in your upper stomach area which may travel to your back and be worse after eating.     Storage:   Store the prescription in the refrigerator. Once it is time to use the Wegovy pen, you can keep the pen at room temperature and it is good for up to 28 days at room temperature.     How to inject:  For a video on how to use the pen please go to:  https://www.[employer]/about-wegovy/how-to-use-the-wegovy-pen.html#itemTwo       For any questions or concerns please send a [employer] message to our team or call  [phone].  For emergencies please 911 or seek immediate medical care.     There is a small chance you may have some low blood sugar after taking the medication.   The signs of low blood sugar are:  Weakness  Shaky   Hungry  Sweating  Confusion      See below for ways to treat low blood sugar without adding in lots of extra calories.      Treating Low Blood Sugar    If you have symptoms of low blood sugar (sweating, shaking, dizzy, confused) eat 15 grams of carbs and wait 15 minutes:    Glucose Tabs are best for sugars under 70 -  Dex4 or BD Glucose tablets are good, you will need to " take 3-4 of these to equal 15 grams.     One small box of raisins  4 oz fruit juice box or   cup fruit juice  1 small apple  1 small banana    cup canned fruit in water    English muffin or a slice of bread with jelly   1 low fat frozen waffle with sugar-free syrup    cup cottage cheese with   cup frozen or fresh blueberries  1 cup skim or low-fat milk    cup whole grain cereal  4-6 crackers such as Triscuits      This medication is usually not covered by insurance and can be quite expensive. Sometimes a prior authorization is required, which may take up to 1-2 weeks for an insurance company to make a decision if they will cover the medication. Please be patient, you will be notified after a decision has been made.    Contact the nurse via [employer] or call [phone] if you have any questions or concerns. (Do not stop taking it if you don't think it's working. For some people it works without them knowing it.)     In order to get refills of this or any medication we prescribe you must be seen in the medical weight mgmt clinic every 2-4 months.    Using Your Wegovy Pen  Medicine (semaglutide)    Storing your pens  Store your pens in the refrigerator until you are ready to use them. Don't let them freeze.  Your pen may be stored at room temperature for 28 days or fewer. Just make sure the temperature doesn't get higher than 86 or lower than 46 degrees Fahrenheit.   Protect the pens from light.  Never use any pens that have .    Check your pen before use:  The liquid in the pen window should be clear and colorless. Bubbles are okay to see.   Do not use the pen if you can see the window contains any specks or it is cloudy or has changed color.  Make sure you have the medication and dose your health care provider prescribed.    Getting ready to inject:   1. Wash and dry your hands well.  2. Make sure the counter you use to place your supplies on is clean.  3. Make sure your injection time will not be interrupted  by children or pets.  4. Have alcohol wipes or alcohol and cotton balls available to clean the injection site.   5. Choose your injection site. It can be on your stomach, back of upper arms, or upper legs. Remember to change your injection site each time you inject. Try to be at least 1 inch away from the previous one. Stay at least 1 inch away from your belly button.     Inject your dose:  1. Pull off the grey cap off the pen. Throw it in the trash. Do not put the cap back on the pen.   2. Clean the injection site with alcohol.   3. Push the grey part of the pen firmly against your skin. This will start the injection.  4. When the pen is injecting, you will hear 2 clicks. The first click tells you the injection has started. The second one tells you the injection is continuing.   5. The injection is done when the yellow bar in the glass window at the bottom of the pen has stopped moving.   6. This injection is given 1 day a week. The pens come in  5 doses ranging from 0.25 mg to 2.4 mg. Each dose comes in a different color pen.  7. If you miss a dose, take is as soon as you remember. Do not take a missed dose, if it is within 2 days of the next dose.    8. Your injection may be best tolerated if given at night.     Disposing of your pens:  Dispose of your pens in a puncture-resistant container (hard plastic bottle) or Sharps container.  Check with the county you live in on how to dispose of the container.  Do not recycle the container with used pens.     Of note, you may not be able to  your medication right away. It may require a prior authorization from your insurance company. This may take a week or more.       Compound Semaglutide at [facility]  [facility] is now offering compounded semaglutide during the time of Wegovy national shortage/limited supply. Semaglutide is the generic name of Wegovy. [facility] is following the highest standards for sterility and  compounding practices. Not all compounding practices are equal. Therefore, [facility] will not be prescribing compounded semaglutide outside of the [facility]. Compounding of semaglutide is legal for as long as Wegovy is on the FDA's national shortage list. When/if Wegovy is taken off the FDA's shortage list, compounded semaglutide will no longer be legal to manufacture. When this occurs, patients will have to turn to acquiring Wegovy via its available manufactured pen, look into alternative weight loss medication(s), or stop the medication. Compound semaglutide will be available as a pre-filled syringe. Due to high demand of compounded semaglutide, orders may take 1-2 weeks to obtain from time of prescribing. Each dose of the medication will require a separate prescription.     As with any weight loss medication(s), there is a risk of weight regain should you stop semaglutide. It is important to be aware of this risk should you stop compounded semaglutide with no plans to transition back to an alternative injectable option as the use of semaglutide is intended for long term weight management with the intention of remaining on this injectable long term.        Obtaining Medication and Storage:   The pharmacy must speak to the patient directly prior to shipping medication to walk through administration, shipping and cost. Pre-filled syringes of compounded semaglutide and needles will be mailed from the compounding pharmacy to your home in a refrigerated box. The pre-filled syringes should be stored in the refrigerator until time of injection. The medication is good for at least 30 days in refrigerator.     Dosing:  Week 1-4: Inject 0.25 mg subcutaneously once weekly  Week 5-8: If tolerating, increase to 0.5 mg once weekly  Week 9-12: If tolerating, increase to 1 mg once weekly  Week 13-15: If tolerating, increase to 1.5 mg once weekly  Week 16-19: If tolerating, increase to 2 mg once  weekly  Week 20 & on: If tolerating, increase to 2.5 mg once weekly   *If you are having some nausea or other side effects to where you are hesitant to move up to the next dose, stay at the same dose you are on for an additional 4 weeks to see if side effect(s) improves/resolves. Make sure to take this time to hydrate and utilizing smaller more consistent meals, such as 4-6 small meals per day.  It may be advantageous to stay at the same dose if you are seeing good efficacy (both on and off the scale) and having minimal/manageable side effects. If you do not have additional refills on that dose's prescription, please reach out to the clinic.    Common Side Effects:  Side effect profile is the same as Wegovy. Monitor for nausea, diarrhea, constipation, headache, indigestion, tiredness (fatigue), stomach upset or abdominal pain. Less commonly, semaglutide can cause low blood sugar (symptoms: shaky, dizzy, sweaty, agitation). Please reach out to the care team should you feel like this is occurring. It is important to ensure that you are eating consistent meals and not skipping meals. Ensure you are getting at least 64 oz water daily. If any side effects become unmanageable, contact the care team immediately.    Administration:  Wash your hands.   Obtain compound semaglutide syringe, needle and alcohol swab. Remove pre-filled syringe from refrigerator. Keep all other unused syringes in the refrigerator until time of use.   Inspect the syringe to ensure medication in syringe is clear/colorless and the clear shell cap on top of red syringe cap is intact.  Unlock the cap by pulling the clear outer shell straight off and remove the red syringe cap by twisting counter clockwise.  Attach needle to syringe by twisting needle onto syringe clockwise. Do not remove needle cap.   Choose injection site. Clean injection site with alcohol swab.    Appropriate areas of injection are: abdomen (at least 2 inches away from belly button)  "or front middle thigh.   Remove needle cap from syringe/needle.   Hold the syringe like a pencil. Insert the needle into skin at a 90-degree angle.  Using your pointer finger, push the syringe plunger down to inject the medicine.  Count to six. Then, remove the syringe from your skin.   Immediately place the syringe in a sharps container.   You can purchase a sharps container from your local pharmacy or [employer]. If you don't have a sharps container, you can use a plastic detergent container with a lid. The container should seal tightly, hold objects without leaking, breaking or cracking, and clearly be labelled \"sharps\".     Compounded Semaglutide monthly (4 pre-filled syringes) cost:   0.25 mg ~$230   0.5 mg ~$260   1 mg ~$306   1.5 mg ~$342   2 mg ~$395   2.5 mg ~$438    [facility] Phone:  [phone]                              "

## 2024-05-17 ENCOUNTER — TELEPHONE (OUTPATIENT)
Dept: SURGERY | Facility: CLINIC | Age: 44
End: 2024-05-17
Payer: COMMERCIAL

## 2024-05-17 DIAGNOSIS — E66.3 OVERWEIGHT (BMI 25.0-29.9): Primary | ICD-10-CM

## 2024-05-17 DIAGNOSIS — M54.6 CHRONIC BILATERAL THORACIC BACK PAIN: ICD-10-CM

## 2024-05-17 DIAGNOSIS — G89.29 CHRONIC BILATERAL THORACIC BACK PAIN: ICD-10-CM

## 2024-05-17 RX ORDER — SEMAGLUTIDE 0.25 MG/.5ML
0.25 INJECTION, SOLUTION SUBCUTANEOUS WEEKLY
Qty: 2 ML | Refills: 0 | Status: SHIPPED | OUTPATIENT
Start: 2024-05-17 | End: 2024-05-28

## 2024-05-17 RX ORDER — SEMAGLUTIDE 0.5 MG/.5ML
0.5 INJECTION, SOLUTION SUBCUTANEOUS WEEKLY
Qty: 2 ML | Refills: 1 | Status: SHIPPED | OUTPATIENT
Start: 2024-05-17 | End: 2024-05-28

## 2024-05-17 RX ORDER — SEMAGLUTIDE 1 MG/.5ML
1 INJECTION, SOLUTION SUBCUTANEOUS WEEKLY
Qty: 2 ML | Refills: 1 | Status: SHIPPED | OUTPATIENT
Start: 2024-05-17 | End: 2024-05-28

## 2024-05-17 NOTE — TELEPHONE ENCOUNTER
Prior Authorization Retail Medication Request    Medication/Dose: PA:  Semaglutide-Weight Management (WEGOVY) 0.25 MG/0.5ML pen 2 mL 0 5/17/2024 - --  Sig - Route: Inject 0.25 mg Subcutaneous once a week For 4 weeks - Subcutaneous  Semaglutide-Weight Management (WEGOVY) 0.5 MG/0.5ML pen 2 mL 1 5/17/2024 - --  Sig - Route: Inject 0.5 mg Subcutaneous once a week - Subcutaneous  Semaglutide-Weight Management (WEGOVY) 1 MG/0.5ML pen 2 mL 1 5/17/2024 - --  Sig - Route: Inject 1 mg Subcutaneous once a week - Subcutaneous    Diagnosis and ICD code (if different than what is on RX):   [E66.3][M54.6, G89.29]      New/renewal/insurance change PA/secondary ins. PA: NEW  Previously Tried and Failed:  Watching Portions or Calories    Exercise    Weight Watchers    Atkins-type Diet (Low Carb/High Protein)    Slim Fast or Other Liquid Diets    Fasting  Rationale:  Weight management    Insurance   Primary: [employer]   Insurance ID:  [id number]     Pharmacy Information (if different than what is on RX)  Name:    [id number] [place], [place] - [facility] [address] AT [facility]     Phone:  [phone]   Fax:    [phone]

## 2024-05-26 ENCOUNTER — E-VISIT (OUTPATIENT)
Dept: URGENT CARE | Facility: CLINIC | Age: 44
End: 2024-05-26
Payer: COMMERCIAL

## 2024-05-26 DIAGNOSIS — J02.9 SORE THROAT: Primary | ICD-10-CM

## 2024-05-26 PROCEDURE — 99421 OL DIG E/M SVC 5-10 MIN: CPT | Performed by: NURSE PRACTITIONER

## 2024-05-26 NOTE — PATIENT INSTRUCTIONS
Dear Ana,    After reviewing your responses, I would like you to come in for a swab to make sure we treat you correctly. This swab is to evaluate you for possible Strep Throat, and should be scheduled for today or tomorrow. Please use the Schedule Now button in [facility] to schedule your swab. Otherwise, click this link to schedule a lab only appointment.    Lab appointments are not available at most locations on the weekends. If no Lab Only appointment is available, you should be seen in any of our convenient Urgent Care Centers for an in person visit, which can be found on our website here.    You will receive instructions with your results in [facility] once they are available.     If your symptoms worsen, you develop difficulty breathing, difficulty with drinking enough to stay hydrated, difficulty swallowing your saliva or have fevers for more than 5 days, please contact your primary care provider for an appointment or visit an Urgent Care Center to be seen.      Thanks again for choosing us as your health care partner.   ASHLEY Owen CNP

## 2024-05-28 ENCOUNTER — LAB (OUTPATIENT)
Dept: LAB | Facility: CLINIC | Age: 44
End: 2024-05-28
Payer: COMMERCIAL

## 2024-05-28 DIAGNOSIS — M54.6 CHRONIC BILATERAL THORACIC BACK PAIN: ICD-10-CM

## 2024-05-28 DIAGNOSIS — G89.29 CHRONIC BILATERAL THORACIC BACK PAIN: ICD-10-CM

## 2024-05-28 DIAGNOSIS — E66.3 OVERWEIGHT (BMI 25.0-29.9): Primary | ICD-10-CM

## 2024-05-28 DIAGNOSIS — J02.9 SORE THROAT: ICD-10-CM

## 2024-05-28 LAB
DEPRECATED S PYO AG THROAT QL EIA: NEGATIVE
GROUP A STREP BY PCR: NOT DETECTED

## 2024-05-28 PROCEDURE — 87651 STREP A DNA AMP PROBE: CPT

## 2024-05-30 NOTE — TELEPHONE ENCOUNTER
Prior Authorization Not Needed per Insurance-Insurance does not cover compounded Wegovy. Patient s are required to pay out of pocket as compound pharmacy will not bill to insurance. Patient needs to contact pharmacy directly with questions.     Medication: WEGOVY 0.25 MG/0.5ML SC SOAJ  Insurance Company:    Expected CoPay: $    Pharmacy Filling the Rx:    Pharmacy Notified:   Patient Notified:

## 2024-12-02 ENCOUNTER — MYC REFILL (OUTPATIENT)
Dept: SURGERY | Facility: CLINIC | Age: 44
End: 2024-12-02
Payer: COMMERCIAL

## 2024-12-02 DIAGNOSIS — G89.29 CHRONIC BILATERAL THORACIC BACK PAIN: ICD-10-CM

## 2024-12-02 DIAGNOSIS — E66.3 OVERWEIGHT (BMI 25.0-29.9): ICD-10-CM

## 2024-12-02 DIAGNOSIS — M54.6 CHRONIC BILATERAL THORACIC BACK PAIN: ICD-10-CM

## 2024-12-02 NOTE — TELEPHONE ENCOUNTER
Pt stopped compounded semaglutide 1 mg in Oct for sinus surgery.  Is hoping to get back on the 1 mg.  Last saw provider 5/15/24 and no next.  Was to be seen in 3-4 mos.  Will request pt make appt and then send to provider.  Update - pt never notified clinic appt made for 3/11/24.  Will route to provider now.  Ivelisse Hoff, MS, RD, RN

## 2024-12-05 NOTE — TELEPHONE ENCOUNTER
MIC Haro PA-C: Great - can restart but needs to restart titration since off for so long.     Would send 0.25 mg semaglutide w/1RF (if needed)   Then 0.5 mg for the following month w/1RF if needed   And then 1 mg onwards     Repeat BMP for 1/2025 so can still see those results at least annual when on meds

## 2025-01-29 SDOH — HEALTH STABILITY: PHYSICAL HEALTH: ON AVERAGE, HOW MANY DAYS PER WEEK DO YOU ENGAGE IN MODERATE TO STRENUOUS EXERCISE (LIKE A BRISK WALK)?: 3 DAYS

## 2025-01-29 SDOH — HEALTH STABILITY: PHYSICAL HEALTH: ON AVERAGE, HOW MANY MINUTES DO YOU ENGAGE IN EXERCISE AT THIS LEVEL?: 30 MIN

## 2025-01-29 ASSESSMENT — ANXIETY QUESTIONNAIRES
6. BECOMING EASILY ANNOYED OR IRRITABLE: NOT AT ALL
7. FEELING AFRAID AS IF SOMETHING AWFUL MIGHT HAPPEN: NOT AT ALL
GAD7 TOTAL SCORE: 0
5. BEING SO RESTLESS THAT IT IS HARD TO SIT STILL: NOT AT ALL
3. WORRYING TOO MUCH ABOUT DIFFERENT THINGS: NOT AT ALL
7. FEELING AFRAID AS IF SOMETHING AWFUL MIGHT HAPPEN: NOT AT ALL
1. FEELING NERVOUS, ANXIOUS, OR ON EDGE: NOT AT ALL
GAD7 TOTAL SCORE: 0
8. IF YOU CHECKED OFF ANY PROBLEMS, HOW DIFFICULT HAVE THESE MADE IT FOR YOU TO DO YOUR WORK, TAKE CARE OF THINGS AT HOME, OR GET ALONG WITH OTHER PEOPLE?: NOT DIFFICULT AT ALL
IF YOU CHECKED OFF ANY PROBLEMS ON THIS QUESTIONNAIRE, HOW DIFFICULT HAVE THESE PROBLEMS MADE IT FOR YOU TO DO YOUR WORK, TAKE CARE OF THINGS AT HOME, OR GET ALONG WITH OTHER PEOPLE: NOT DIFFICULT AT ALL
2. NOT BEING ABLE TO STOP OR CONTROL WORRYING: NOT AT ALL
GAD7 TOTAL SCORE: 0
4. TROUBLE RELAXING: NOT AT ALL

## 2025-01-29 ASSESSMENT — SOCIAL DETERMINANTS OF HEALTH (SDOH): HOW OFTEN DO YOU GET TOGETHER WITH FRIENDS OR RELATIVES?: ONCE A WEEK

## 2025-01-29 ASSESSMENT — PATIENT HEALTH QUESTIONNAIRE - PHQ9
SUM OF ALL RESPONSES TO PHQ QUESTIONS 1-9: 0
SUM OF ALL RESPONSES TO PHQ QUESTIONS 1-9: 0
10. IF YOU CHECKED OFF ANY PROBLEMS, HOW DIFFICULT HAVE THESE PROBLEMS MADE IT FOR YOU TO DO YOUR WORK, TAKE CARE OF THINGS AT HOME, OR GET ALONG WITH OTHER PEOPLE: NOT DIFFICULT AT ALL

## 2025-02-03 ENCOUNTER — OFFICE VISIT (OUTPATIENT)
Dept: FAMILY MEDICINE | Facility: CLINIC | Age: 45
End: 2025-02-03
Payer: COMMERCIAL

## 2025-02-03 VITALS
HEIGHT: 64 IN | BODY MASS INDEX: 24.87 KG/M2 | TEMPERATURE: 97.7 F | RESPIRATION RATE: 16 BRPM | WEIGHT: 145.7 LBS | HEART RATE: 82 BPM | SYSTOLIC BLOOD PRESSURE: 109 MMHG | DIASTOLIC BLOOD PRESSURE: 77 MMHG | OXYGEN SATURATION: 97 %

## 2025-02-03 DIAGNOSIS — E66.3 OVERWEIGHT (BMI 25.0-29.9): ICD-10-CM

## 2025-02-03 DIAGNOSIS — N94.10 DYSPAREUNIA IN FEMALE: ICD-10-CM

## 2025-02-03 DIAGNOSIS — Z00.00 ROUTINE GENERAL MEDICAL EXAMINATION AT A HEALTH CARE FACILITY: Primary | ICD-10-CM

## 2025-02-03 DIAGNOSIS — M54.6 CHRONIC BILATERAL THORACIC BACK PAIN: ICD-10-CM

## 2025-02-03 DIAGNOSIS — Z12.4 SCREENING FOR CERVICAL CANCER: ICD-10-CM

## 2025-02-03 DIAGNOSIS — Z13.220 SCREENING FOR LIPID DISORDERS: ICD-10-CM

## 2025-02-03 DIAGNOSIS — G89.29 CHRONIC BILATERAL THORACIC BACK PAIN: ICD-10-CM

## 2025-02-03 LAB
ANION GAP SERPL CALCULATED.3IONS-SCNC: 11 MMOL/L (ref 7–15)
BUN SERPL-MCNC: 9.7 MG/DL (ref 6–20)
CALCIUM SERPL-MCNC: 9.3 MG/DL (ref 8.8–10.4)
CHLORIDE SERPL-SCNC: 103 MMOL/L (ref 98–107)
CHOLEST SERPL-MCNC: 230 MG/DL
CREAT SERPL-MCNC: 0.66 MG/DL (ref 0.51–0.95)
EGFRCR SERPLBLD CKD-EPI 2021: >90 ML/MIN/1.73M2
FASTING STATUS PATIENT QL REPORTED: YES
FASTING STATUS PATIENT QL REPORTED: YES
GLUCOSE SERPL-MCNC: 85 MG/DL (ref 70–99)
HCO3 SERPL-SCNC: 24 MMOL/L (ref 22–29)
HDLC SERPL-MCNC: 54 MG/DL
LDLC SERPL CALC-MCNC: 155 MG/DL
NONHDLC SERPL-MCNC: 176 MG/DL
POTASSIUM SERPL-SCNC: 3.8 MMOL/L (ref 3.4–5.3)
SODIUM SERPL-SCNC: 138 MMOL/L (ref 135–145)
TRIGL SERPL-MCNC: 106 MG/DL

## 2025-02-03 PROCEDURE — 80061 LIPID PANEL: CPT | Performed by: FAMILY MEDICINE

## 2025-02-03 PROCEDURE — 99213 OFFICE O/P EST LOW 20 MIN: CPT | Mod: 25 | Performed by: FAMILY MEDICINE

## 2025-02-03 PROCEDURE — 87624 HPV HI-RISK TYP POOLED RSLT: CPT | Performed by: FAMILY MEDICINE

## 2025-02-03 PROCEDURE — 99396 PREV VISIT EST AGE 40-64: CPT | Performed by: FAMILY MEDICINE

## 2025-02-03 PROCEDURE — 80048 BASIC METABOLIC PNL TOTAL CA: CPT | Performed by: FAMILY MEDICINE

## 2025-02-03 PROCEDURE — G0145 SCR C/V CYTO,THINLAYER,RESCR: HCPCS | Performed by: FAMILY MEDICINE

## 2025-02-03 ASSESSMENT — PAIN SCALES - GENERAL: PAINLEVEL_OUTOF10: SEVERE PAIN (7)

## 2025-02-03 NOTE — PROGRESS NOTES
"Preventive Care Visit  [facility]  Blessing Lazcano MD, Family Medicine  Feb 3, 2025  {Provider  Link to [facility] :[id number]}    Assessment & Plan     Routine general medical examination at a [facility] care facility  Pap smear completed-02/03/25 & NILM- repeat ok in 3-5 yrs  Mammogram advised - every 1-2 yrs and       Dyspareunia in female  Most likely due to vaginismus -  Plan: patient was given reassurance- as such no anatomical concerns   I have advised to proceed with ultrasound , start PHYSICAL THERAPY and if then proceed with OBG/GYN   - US Pelvic Transabdominal and Transvaginal; Future  - Physical Therapy  Referral; Future  - Ob/Gyn  Referral; Future      Screening for lipid disorders  Hyperlipidemia   - Lipid panel reflex to direct LDL Fasting; Future  - Lipid panel reflex to direct LDL Fasting  Recent Labs   Lab Test 02/03/25  1416 01/30/24  1104   CHOL 230* 211*   HDL 54 51   * 139*   TRIG 106 105    The 10-year ASCVD risk score (Ramesh SCHREIBER, et al., 2019) is: 0.6%    Values used to calculate the score:      Age: 44 years      Sex: Female      Is Non- : No      Diabetic: No      Tobacco smoker: No      Systolic Blood Pressure: 109 mmHg      Is BP treated: No      HDL Cholesterol: 54 mg/dL      Total Cholesterol: 230 mg/dL    Overweight (BMI 25.0-29.9)  - Basic metabolic panel    Chronic bilateral thoracic back pain  - Basic metabolic panel    Patient has been advised of split billing requirements and indicates understanding: Yes        BMI  Estimated body mass index is 25.21 kg/m  as calculated from the following:    Height as of this encounter: 1.619 m (5' 3.75\").    Weight as of this encounter: 66.1 kg (145 lb 11.2 oz).       Counseling  Appropriate preventive services were addressed with this patient via screening, questionnaire, or discussion as appropriate for fall prevention, nutrition, physical activity, Tobacco-use cessation, social " engagement, weight loss and cognition.  Checklist reviewing preventive services available has been given to the patient.  Reviewed patient's diet, addressing concerns and/or questions.   She is at risk for lack of exercise and has been provided with information to increase physical activity for the benefit of her well-being.       Work on weight loss  Regular exercise    Subjective   Ana is a 44 year old, presenting for the following:  Physical (Pt is fasting/)        2/3/2025     1:31 PM   Additional Questions   Roomed by Dara DING   Accompanied by self          Healthy Habits:     Additional concerns today:  Yes (painful intercourse since having section 5 years, severe. no bleeding. tried pelvic exercises.)    Answers submitted by the patient for this visit:  Patient Health Questionnaire (Submitted on 2025)  If you checked off any problems, how difficult have these problems made it for you to do your work, take care of things at home, or get along with other people?: Not difficult at all  PHQ9 TOTAL SCORE: 0  Patient Health Questionnaire (G7) (Submitted on 2025)  LAURIE 7 TOTAL SCORE: 0        pelvic floor therapy  some- based on video on her own   5 years post-c section and sex is still painful.   [place].     Health Care Directive  Patient does not have a Health Care Directive: Discussed advance care planning with patient; however, patient declined at this time.      2025   General Health   How would you rate your overall physical health? Good   Feel stress (tense, anxious, or unable to sleep) Only a little   (!) STRESS CONCERN      2025   Nutrition   Three or more servings of calcium each day? Yes   Diet: Regular (no restrictions)   How many servings of fruit and vegetables per day? (!) 2-3   How many sweetened beverages each day? 0-1         2025   Exercise   Days per week of moderate/strenous exercise 3 days   Average minutes spent exercising at this level 30 min         2025    Social Factors   Frequency of gathering with friends or relatives Once a week   Worry food won't last until get money to buy more No   Food not last or not have enough money for food? No   Do you have housing? (Housing is defined as stable permanent housing and does not include staying ouside in a car, in a tent, in an abandoned building, in an overnight shelter, or couch-surfing.) Yes   Are you worried about losing your housing? No   Lack of transportation? No   Unable to get utilities (heat,electricity)? No         2025   Dental   Dentist two times every year? Yes         2025   TB Screening   Were you born outside of the [place]? No       Today's PHQ-9 Score:       2025     6:52 PM   PHQ-9 SCORE   PHQ-9 Total Score MyChart 0   PHQ-9 Total Score 0        Patient-reported         2025   Substance Use   Alcohol more than 3/day or more than 7/wk No   Do you use any other substances recreationally? (!) CANNABIS PRODUCTS     Social History     Tobacco Use    Smoking status: Former     Current packs/day: 0.00     Average packs/day: 0.5 packs/day for 10.0 years (5.0 ttl pk-yrs)     Types: Cigarettes     Start date: 2002     Quit date: 2012     Years since quittin.1     Passive exposure: Never    Smokeless tobacco: Never   Vaping Use    Vaping status: Never Used   Substance Use Topics    Alcohol use: Yes     Comment: 3-6 drinks per week    Drug use: Yes     Types: Marijuana     {Provider  If there are gaps in the social history shown above, please follow the link to update and then refresh the note Link to Social and Substance History :[id number]}      2024   LAST FHS-7 RESULTS   1st degree relative breast or ovarian cancer No   Any relative bilateral breast cancer No   Any male have breast cancer No   Any ONE woman have BOTH breast AND ovarian cancer No   Any woman with breast cancer before 50yrs No   2 or more relatives with breast AND/OR ovarian cancer No   2 or more relatives with  breast AND/OR bowel cancer No     {If any of the questions to the FHS7 are answered yes, consider referral for genetic counseling.    Additional indications for genetic referral include personal history of breast or ovarian cancer, genetic mutation in 1st degree relative which increases risk of breast cancer including BRCA1, BRCA2, MALA, PALB 2, TP53, CHEK2, PTEN, CDH1, STK11 (per ACS) and/or 1st degree relative with history of pancreatic or high-risk prostate cancer (per NCCN):[id number]}   Mammogram Screening - Patient under 40 years of age: Routine Mammogram Screening not recommended.         1/29/2025   STI Screening   New sexual partner(s) since last STI/HIV test? No     History of abnormal Pap smear: No - age 30-64 HPV with reflex Pap every 5 years recommended        Latest Ref Rng & Units 8/24/2021     9:28 AM   PAP / HPV   PAP  Negative for Intraepithelial Lesion or Malignancy (NILM)    HPV 16 DNA Negative Negative    HPV 18 DNA Negative Negative    Other HR HPV Negative Negative      ASCVD Risk   The 10-year ASCVD risk score (Ramesh SCHREIBER, et al., 2019) is: 0.6%    Values used to calculate the score:      Age: 44 years      Sex: Female      Is Non- : No      Diabetic: No      Tobacco smoker: No      Systolic Blood Pressure: 109 mmHg      Is BP treated: No      HDL Cholesterol: 54 mg/dL      Total Cholesterol: 230 mg/dL        1/29/2025   Contraception/Family Planning   Questions about contraception or family planning No     {Provider  REQUIRED FOR AWV Use the storyboard to review patient history, after sections have been marked as reviewed, refresh note to capture documentation:[id number]}   Reviewed and updated as needed this visit by Provider   Tobacco  Allergies  Meds  Problems  Med Hx  Surg Hx  Fam Hx            BP Readings from Last 3 Encounters:   02/03/25 109/77   05/15/24 112/79   01/30/24 118/76    Wt Readings from Last 3 Encounters:   02/03/25 66.1 kg (145 lb 11.2 oz)  "  05/15/24 76.7 kg (169 lb)   01/30/24 76.7 kg (169 lb)                      Review of Systems  Constitutional, neuro, ENT, endocrine, pulmonary, cardiac, gastrointestinal, genitourinary, musculoskeletal, integument and psychiatric systems are negative, except as otherwise noted.     Objective    Exam  /77 (BP Location: Right arm, Patient Position: Sitting, Cuff Size: Adult Regular)   Pulse 82   Temp 97.7  F (36.5  C) (Temporal)   Resp 16   Ht 1.619 m (5' 3.75\")   Wt 66.1 kg (145 lb 11.2 oz)   LMP 01/16/2025 (Exact Date)   SpO2 97%   BMI 25.21 kg/m     Estimated body mass index is 25.21 kg/m  as calculated from the following:    Height as of this encounter: 1.619 m (5' 3.75\").    Weight as of this encounter: 66.1 kg (145 lb 11.2 oz).    Physical Exam  GENERAL: alert and no distress  EYES: Eyes grossly normal to inspection, PERRL and conjunctivae and sclerae normal  HENT: ear canals and TM's normal, nose and mouth without ulcers or lesions  NECK: no adenopathy, no asymmetry, masses, or scars  RESP: lungs clear to auscultation - no rales, rhonchi or wheezes  BREAST: normal without masses, tenderness or nipple discharge and no palpable axillary masses or adenopathy  CV: regular rate and rhythm, normal S1 S2, no S3 or S4, no murmur, click or rub, no peripheral edema  ABDOMEN: soft, nontender, no hepatosplenomegaly, no masses and bowel sounds normal   (female): normal female external genitalia, normal urethral meatus, normal vaginal mucosa  MS: no gross musculoskeletal defects noted, no edema  SKIN: no suspicious lesions or rashes  NEURO: Normal strength and tone, mentation intact and speech normal  PSYCH: mentation appears normal, affect normal/bright        Signed Electronically by: Blessing Lazcano MD  {Email feedback regarding this note to [email or web address]   :[id number]}  "

## 2025-02-03 NOTE — PATIENT INSTRUCTIONS
Patient Education   Preventive Care Advice   This is general advice given by our system to help you stay healthy. However, your care team may have specific advice just for you. Please talk to your care team about your preventive care needs.  Nutrition  Eat 5 or more servings of fruits and vegetables each day.  Try wheat bread, brown rice and whole grain pasta (instead of white bread, rice, and pasta).  Get enough calcium and vitamin D. Check the label on foods and aim for 100% of the RDA (recommended daily allowance).  Lifestyle  Exercise at least 150 minutes each week  (30 minutes a day, 5 days a week).  Do muscle strengthening activities 2 days a week. These help control your weight and prevent disease.  No smoking.  Wear sunscreen to prevent skin cancer.  Have a dental exam and cleaning every 6 months.  Yearly exams  See your health care team every year to talk about:  Any changes in your health.  Any medicines your care team has prescribed.  Preventive care, family planning, and ways to prevent chronic diseases.  Shots (vaccines)   HPV shots (up to age 26), if you've never had them before.  Hepatitis B shots (up to age 59), if you've never had them before.  COVID-19 shot: Get this shot when it's due.  Flu shot: Get a flu shot every year.  Tetanus shot: Get a tetanus shot every 10 years.  Pneumococcal, hepatitis A, and RSV shots: Ask your care team if you need these based on your risk.  Shingles shot (for age 50 and up)  General health tests  Diabetes screening:  Starting at age 35, Get screened for diabetes at least every 3 years.  If you are younger than age 35, ask your care team if you should be screened for diabetes.  Cholesterol test: At age 39, start having a cholesterol test every 5 years, or more often if advised.  Bone density scan (DEXA): At age 50, ask your care team if you should have this scan for osteoporosis (brittle bones).  Hepatitis C: Get tested at least once in your life.  STIs (sexually  transmitted infections)  Before age 24: Ask your care team if you should be screened for STIs.  After age 24: Get screened for STIs if you're at risk. You are at risk for STIs (including HIV) if:  You are sexually active with more than one person.  You don't use condoms every time.  You or a partner was diagnosed with a sexually transmitted infection.  If you are at risk for HIV, ask about PrEP medicine to prevent HIV.  Get tested for HIV at least once in your life, whether you are at risk for HIV or not.  Cancer screening tests  Cervical cancer screening: If you have a cervix, begin getting regular cervical cancer screening tests starting at age 21.  Breast cancer scan (mammogram): If you've ever had breasts, begin having regular mammograms starting at age 40. This is a scan to check for breast cancer.  Colon cancer screening: It is important to start screening for colon cancer at age 45.  Have a colonoscopy test every 10 years (or more often if you're at risk) Or, ask your provider about stool tests like a FIT test every year or Cologuard test every 3 years.  To learn more about your testing options, visit:   .  For help making a decision, visit:   https://bit.ly/dx55054.  Prostate cancer screening test: If you have a prostate, ask your care team if a prostate cancer screening test (PSA) at age 55 is right for you.  Lung cancer screening: If you are a current or former smoker ages 50 to 80, ask your care team if ongoing lung cancer screenings are right for you.  For informational purposes only. Not to replace the advice of your health care provider. Copyright   2023 [facility]. All rights reserved. Clinically reviewed by the [facility]. Savored 608000 - REV 01/24.  Substance Use Disorder: Care Instructions  Overview     You can improve your life and health by stopping your use of alcohol or drugs. When you don't drink or use drugs, you may feel and sleep better. You may  get along better with your family, friends, and coworkers. There are medicines and programs that can help with substance use disorder.  How can you care for yourself at home?  Here are some ways to help you stay sober and prevent relapse.  If you have been given medicine to help keep you sober or reduce your cravings, be sure to take it exactly as prescribed.  Talk to your doctor about programs that can help you stop using drugs or drinking alcohol.  Do not keep alcohol or drugs in your home.  Plan ahead. Think about what you'll say if other people ask you to drink or use drugs. Try not to spend time with people who drink or use drugs.  Use the time and money spent on drinking or drugs to do something that's important to you.  Preventing a relapse  Have a plan to deal with relapse. Learn to recognize changes in your thinking that lead you to drink or use drugs. Get help before you start to drink or use drugs again.  Try to stay away from situations, friends, or places that may lead you to drink or use drugs.  If you feel the need to drink alcohol or use drugs again, seek help right away. Call a trusted friend or family member. Some people get support from organizations such as Narcotics Anonymous or [employer] or from treatment facilities.  If you relapse, get help as soon as you can. Some people make a plan with another person that outlines what they want that person to do for them if they relapse. The plan usually includes how to handle the relapse and who to notify in case of relapse.  Don't give up. Remember that a relapse doesn't mean that you have failed. Use the experience to learn the triggers that lead you to drink or use drugs. Then quit again. Recovery is a lifelong process. Many people have several relapses before they are able to quit for good.  Follow-up care is a key part of your treatment and safety. Be sure to make and go to all appointments, and call your doctor if you are having problems. It's  "also a good idea to know your test results and keep a list of the medicines you take.  When should you call for help?   Call 911  anytime you think you may need emergency care. For example, call if you or someone else:    Has overdosed or has withdrawal signs. Be sure to tell the emergency workers that you are or someone else is using or trying to quit using drugs. Overdose or withdrawal signs may include:  Losing consciousness.  Seizure.  Seeing or hearing things that aren't there (hallucinations).     Is thinking or talking about suicide or harming others.   Where to get help 24 hours a day, 7 days a week   If you or someone you know talks about suicide, self-harm, a mental health crisis, a substance use crisis, or any other kind of emotional distress, get help right away. You can:    Call the Suicide and Crisis Lifeline at 988.     Call 1-378-400-TALK (1-655.129.6403).     Text HOME to 683939 to access the Crisis Text Line.   Consider saving these numbers in your phone.  Go to "[employer] for more information or to chat online.  Call your doctor now or seek immediate medical care if:    You are having withdrawal symptoms. These may include nausea or vomiting, sweating, shakiness, and anxiety.   Watch closely for changes in your health, and be sure to contact your doctor if:    You have a relapse.     You need more help or support to stop.   Where can you learn more?  Go to https://www.[facility].net/patiented  Enter H573 in the search box to learn more about \"Substance Use Disorder: Care Instructions.\"  Current as of: November 15, 2023  Content Version: 14.3    2024 [employer].   Care instructions adapted under license by your healthcare professional. If you have questions about a medical condition or this instruction, always ask your healthcare professional. [employer] disclaims any warranty or liability for your use of this information.       "

## 2025-02-04 LAB
HPV HR 12 DNA CVX QL NAA+PROBE: NEGATIVE
HPV16 DNA CVX QL NAA+PROBE: NEGATIVE
HPV18 DNA CVX QL NAA+PROBE: NEGATIVE
HUMAN PAPILLOMA VIRUS FINAL DIAGNOSIS: NORMAL

## 2025-02-06 LAB
BKR AP ASSOCIATED HPV REPORT: NORMAL
BKR LAB AP GYN ADEQUACY: NORMAL
BKR LAB AP GYN INTERPRETATION: NORMAL
BKR LAB AP LMP: NORMAL
BKR LAB AP PREVIOUS ABNORMAL: NORMAL
PATH REPORT.COMMENTS IMP SPEC: NORMAL
PATH REPORT.COMMENTS IMP SPEC: NORMAL
PATH REPORT.RELEVANT HX SPEC: NORMAL

## 2025-02-18 ENCOUNTER — ANCILLARY PROCEDURE (OUTPATIENT)
Dept: MAMMOGRAPHY | Facility: CLINIC | Age: 45
End: 2025-02-18
Attending: OBSTETRICS & GYNECOLOGY
Payer: COMMERCIAL

## 2025-02-18 DIAGNOSIS — Z12.31 VISIT FOR SCREENING MAMMOGRAM: ICD-10-CM

## 2025-02-18 PROCEDURE — 77063 BREAST TOMOSYNTHESIS BI: CPT | Mod: TC | Performed by: RADIOLOGY

## 2025-02-18 PROCEDURE — 77067 SCR MAMMO BI INCL CAD: CPT | Mod: TC | Performed by: RADIOLOGY

## 2025-02-19 ASSESSMENT — ANXIETY QUESTIONNAIRES
GAD7 TOTAL SCORE: 6
3. WORRYING TOO MUCH ABOUT DIFFERENT THINGS: SEVERAL DAYS
6. BECOMING EASILY ANNOYED OR IRRITABLE: SEVERAL DAYS
2. NOT BEING ABLE TO STOP OR CONTROL WORRYING: SEVERAL DAYS
IF YOU CHECKED OFF ANY PROBLEMS ON THIS QUESTIONNAIRE, HOW DIFFICULT HAVE THESE PROBLEMS MADE IT FOR YOU TO DO YOUR WORK, TAKE CARE OF THINGS AT HOME, OR GET ALONG WITH OTHER PEOPLE: SOMEWHAT DIFFICULT
4. TROUBLE RELAXING: NOT AT ALL
GAD7 TOTAL SCORE: 6
1. FEELING NERVOUS, ANXIOUS, OR ON EDGE: NEARLY EVERY DAY
7. FEELING AFRAID AS IF SOMETHING AWFUL MIGHT HAPPEN: NOT AT ALL
7. FEELING AFRAID AS IF SOMETHING AWFUL MIGHT HAPPEN: NOT AT ALL
8. IF YOU CHECKED OFF ANY PROBLEMS, HOW DIFFICULT HAVE THESE MADE IT FOR YOU TO DO YOUR WORK, TAKE CARE OF THINGS AT HOME, OR GET ALONG WITH OTHER PEOPLE?: SOMEWHAT DIFFICULT
GAD7 TOTAL SCORE: 6
5. BEING SO RESTLESS THAT IT IS HARD TO SIT STILL: NOT AT ALL

## 2025-02-19 ASSESSMENT — PATIENT HEALTH QUESTIONNAIRE - PHQ9
SUM OF ALL RESPONSES TO PHQ QUESTIONS 1-9: 7
SUM OF ALL RESPONSES TO PHQ QUESTIONS 1-9: 7
10. IF YOU CHECKED OFF ANY PROBLEMS, HOW DIFFICULT HAVE THESE PROBLEMS MADE IT FOR YOU TO DO YOUR WORK, TAKE CARE OF THINGS AT HOME, OR GET ALONG WITH OTHER PEOPLE: SOMEWHAT DIFFICULT
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
## 2025-02-20 ENCOUNTER — VIRTUAL VISIT (OUTPATIENT)
Dept: BEHAVIORAL HEALTH | Facility: CLINIC | Age: 45
End: 2025-02-20
Payer: COMMERCIAL

## 2025-02-20 NOTE — PROGRESS NOTES
"Ana is a 44 year old who is being evaluated via a billable video visit.      The patient has been notified of following:     \"This video visit will be conducted via a call between you and your physician/provider. We have found that certain health care needs can be provided without the need for an in-person physical exam.  This service lets us provide the care you need with a video conversation.  If a prescription is necessary we can send it directly to your pharmacy.  If lab work is needed we can place an order for that and you can then stop by our lab to have the test done at a later time.    Video visits are billed at different rates depending on your insurance coverage.  Please reach out to your insurance provider with any questions.    If during the course of the call the physician/provider feels a video visit is not appropriate, you will not be charged for this service.\"    Patient has given verbal consent for Video visit? Yes    How would you like to obtain your AVS? MyChart    If the video visit is dropped, the invitation should be resent by: Send to e-mail at: [email or web address]    Will anyone else be joining your video visit? No    I    Video-Visit Details    Type of service:  Video Visit    Originating Location (pt. Location): Home    Distant Location (provider location):   Off-Site - Provider Home Office    Platform used for Video Visit: [employer]    Video Start Time: 9:38 AM    Video End Time:10:00 AM       3/11/2025      Return Medical Weight Management Note     Ana Cleveland  MRN:  [id number]  :  1980    Assessment & Plan   Problem List Items Addressed This Visit       History of obesity - Primary     Get 1 more month supply of semaglutide then taper off 1 mg semaglutide by increasing time between dose duration.  Start metformin.  Creatinine within normal limits. Risks/ benefits and possible side effects were discussed and questions were answered. Written information was given.  BMP " in 3 months    WEIGHT METRICS:  Body mass index is 24.2 kg/m .   Current Weight: 141 lb (64 kg)  Last Visits Weight: 169 lb (76.7 kg)  Initial Weight (lbs): 168 lbs  Initial BMI: 28.3  Cumulative weight loss (lbs): 27  Weight Loss Percentage: 16.07%         Relevant Medications    metFORMIN (GLUCOPHAGE XR) 500 MG 24 hr tablet    Other Relevant Orders    Basic metabolic panel      AOM Considerations 3/11/2025   Phentermine:     Topiramate: Current birth control:      No birth control - but trying not to get pregnant           GLP-1:            On compounded semaglutide, tapering off               Naltrexone:        Wellbutrin:         Metformin:   Candidate, starting today will taper off GLP-1      Contrave:  Qsymia:       PATIENT INSTRUCTIONS:  Continue Semaglutide.  Increase time between doses 10 days-14-days-18 days then stop.      Start Metformin  Directions: Take 1 tablet by mouth daily with a meal for 1 week, then increase to 2 tablets daily with a meal, if tolerating can increase to 3 tablets daily with a meal or at bedtime.     Labs ordered.  Please call [phone] to set up a lab appt in 3 mo    Follow up:    Call [phone] to schedule next visit in July/Oct    25 minutes spent on the date of the encounter doing chart review, history and exam, result review, counseling, developing plan of care, documentation, and further activities as noted      INTERVAL HISTORY:  Ana returns for medical weight management follow up.  Last seen on Last saw provider 5/15/24.  Estrellita prescribed Wegovy to start leading up to today's aapt.    Pt stopped compounded semaglutide 1 mg in Oct for sinus surgery.  Is hoping to get back on the 1 mg.  Last saw provider 5/15/24 and no next.    WEIGHT METRICS:  Body mass index is 24.2 kg/m .   Current Weight: 141 lb (64 kg)  Last Visits Weight: 169 lb (76.7 kg)  Initial Weight (lbs): 168 lbs  Cumulative weight loss (lbs): 27  Weight Loss Percentage: 16.07%    Wt Readings from Last 10  Encounters:   03/11/25 141 lb (64 kg)   02/03/25 145 lb 11.2 oz (66.1 kg)   05/15/24 169 lb (76.7 kg)   01/30/24 169 lb (76.7 kg)   10/03/23 168 lb (76.2 kg)   12/13/22 171 lb 9.6 oz (77.8 kg)   04/15/22 168 lb (76.2 kg)   08/24/21 174 lb (78.9 kg)   01/24/20 181 lb 6.4 oz (82.3 kg)   12/10/19 204 lb (92.5 kg)                 2/24/2025     9:46 AM   Weight Loss Medication History Reviewed With Patient   Which weight loss medications are you currently taking on a regular basis? Wegovy   Are you having any side effects from the weight loss medication that we have prescribed you? Yes   If you are having side effects please describe: Occasional blood in stoop but no pain (bright red)           2/24/2025     9:46 AM   Changes and Difficulties   I have made the following changes to my diet since my last visit: Prioritizing protein and lots of water, but way back on alcohol   With regards to my diet, I am still struggling with: NA   I have made the following changes to my activity/exercise since my last visit: Lifting weights 3x/week   With regards to my activity/exercise, I am still struggling with: NA       LABS:  Hemoglobin A1C   Date Value Ref Range Status   04/15/2022 4.6 0.0 - 5.6 % Final     Comment:     Normal <5.7%   Prediabetes 5.7-6.4%    Diabetes 6.5% or higher     Note: Adopted from ADA consensus guidelines.     Creatinine   Date Value Ref Range Status   02/03/2025 0.66 0.51 - 0.95 mg/dL Final   12/16/2019 0.54 0.52 - 1.04 mg/dL Final     GFR Estimate   Date Value Ref Range Status   02/03/2025 >90 >60 mL/min/1.73m2 Final     Comment:     eGFR calculated using 2021 CKD-EPI equation.   12/16/2019 >90 >60 mL/min/[1.73_m2] Final     Comment:     Non  GFR Calc  Starting 12/18/2018, serum creatinine based estimated GFR (eGFR) will be   calculated using the Chronic Kidney Disease Epidemiology Collaboration   (CKD-EPI) equation.       Carbon Dioxide (CO2)   Date Value Ref Range Status   02/03/2025 24  22 - 29 mmol/L Final   04/15/2022 27 20 - 32 mmol/L Final     Chloride   Date Value Ref Range Status   02/03/2025 103 98 - 107 mmol/L Final   04/15/2022 107 94 - 109 mmol/L Final     Urea Nitrogen   Date Value Ref Range Status   02/03/2025 9.7 6.0 - 20.0 mg/dL Final   04/15/2022 13 7 - 30 mg/dL Final     ALT   Date Value Ref Range Status   01/30/2024 7 0 - 50 U/L Final     Comment:     Reference intervals for this test were updated on 6/12/2023 to more accurately reflect our healthy population. There may be differences in the flagging of prior results with similar values performed with this method. Interpretation of those prior results can be made in the context of the updated reference intervals.       AST   Date Value Ref Range Status   01/30/2024 14 0 - 45 U/L Final     Comment:     Reference intervals for this test were updated on 6/12/2023 to more accurately reflect our healthy population. There may be differences in the flagging of prior results with similar values performed with this method. Interpretation of those prior results can be made in the context of the updated reference intervals.     Vitamin D Deficiency screening   Date Value Ref Range Status   09/11/2019 73 20 - 75 ug/L Final     Comment:     Season, race, dietary intake, and treatment affect the concentration of   25-hydroxy-Vitamin D. Values may decrease during winter months and increase   during summer months. Values 20-29 ug/L may indicate Vitamin D insufficiency   and values <20 ug/L may indicate Vitamin D deficiency.  Vitamin D determination is routinely performed by an immunoassay specific for   25 hydroxyvitamin D3.  If an individual is on vitamin D2 (ergocalciferol)   supplementation, please specify 25 OH vitamin D2 and D3 level determination by   LCMSMS test VITD23.       TSH   Date Value Ref Range Status   01/30/2024 2.52 0.30 - 4.20 uIU/mL Final   04/15/2022 2.78 0.40 - 4.00 mU/L Final        BP Readings from Last 6 Encounters:  "  02/03/25 109/77   05/15/24 112/79   01/30/24 118/76   12/13/22 109/70   04/15/22 107/72   08/24/21 116/83       Pulse Readings from Last 6 Encounters:   02/03/25 82   05/15/24 85   01/30/24 77   12/13/22 77   04/15/22 74   08/24/21 80       PE:  Ht 5' 4\" (1.626 m)   Wt 141 lb (64 kg)   LMP 01/16/2025 (Exact Date)   BMI 24.20 kg/m    GENERAL: Healthy, alert and no distress  RESP: No audible wheeze, cough, or visible cyanosis.  No visible retractions or increased work of breathing.    SKIN: Visible skin clear. No significant rash, abnormal pigmentation or lesions.  PSYCH: Mentation appears normal, affect normal/bright, judgement and insight intact        "

## 2025-03-09 ENCOUNTER — MYC MEDICAL ADVICE (OUTPATIENT)
Dept: OBGYN | Facility: CLINIC | Age: 45
End: 2025-03-09
Payer: COMMERCIAL

## 2025-03-10 NOTE — PROGRESS NOTES
[facility]    Behavioral Health Clinician Progress Note   Mental Health & Addiction Services      2025    Patient Name: Ana Cleveland       Service Type:  Individual   Service Location:  [email or web address] / Email (patient reached)   Visit Start Time: 4:00 PM  Visit End Time:  4:20 pm    Session Length: 16 - 37    Attendees: Patient   Service Modality: Video Visit:      Provider verified identity through the following two step process.  Patient provided:  Patient photo and Patient     Telemedicine Visit: The patient's condition can be safely assessed and treated via synchronous audio and visual telemedicine encounter.      Reason for Telemedicine Visit: Patient convenience (e.g. access to timely appointments / distance to available provider)    Originating Site (Patient Location): Patient's home    Distant Site (Provider Location): Provider Remote Setting- Home Office    Consent:  The patient/guardian has verbally consented to: the potential risks and benefits of telemedicine (video visit) versus in person care; bill my insurance or make self-payment for services provided; and responsibility for payment of non-covered services.     Patient would like the video invitation sent by:  My Chart    Mode of Communication:  [facility]    Distant Location (Provider):  Off-site    As the provider I attest to compliance with applicable laws and regulations related to telemedicine.   Visit number: 2    [facility] Visit Activities (Refresh list every visit): [facility] Only     [facility] Diagnostic Assessment: TBD  Treatment Plan Review Date: 2025      DATA:    Extended Session (60+ minutes): No   Interactive Complexity: No   Crisis: No   [facility] Patient: No     Assessments completed prior to visit:   The following assessments were completed by patient for this visit:  PHQ2:   Phq2 (   1999 [employer],All Rights Reserved. Used With Permission. Developed By Drs.  Annalisa Cuellar,Angel Castle And Colleagues,With An Educational Dylon From [employer].)    2/2/2025  2:27 PM CST - Filed by Patient   The following questionnaire should only be answered by the patient. Are you the patient? Yes   Over the last 2 weeks, how often have you been bothered by any of the following problems?    Q1: Little interest or pleasure in doing things Not at all   Q2: Feeling down, depressed or hopeless Not at all   PHQ2 (   1999 [employer],ALL RIGHTS RESERVED. USED WITH PERMISSION. DEVELOPED BY AURY ESCOBAR,ANGEL GOMEZ AND COLLEAGUES,WITH AN EDUCATIONAL DYLON FROM [employer].)    PHQ-2 Score (range: 0 - 6) 0       PHQ9:       11/20/2018     4:20 PM 9/11/2019     9:16 AM 11/5/2019     9:24 AM 1/24/2020     9:10 AM 1/30/2024    10:30 AM 1/29/2025     6:52 PM 2/19/2025     5:35 PM   PHQ-9 SCORE   PHQ-9 Total Score MyChart     0 0 7 (Mild depression)   PHQ-9 Total Score 3 1 1 0 0 0  7        Patient-reported     GAD2:       2/19/2025     5:36 PM   LAURIE-2   Feeling nervous, anxious, or on edge 3   Not being able to stop or control worrying 1   LAURIE-2 Total Score 4        Patient-reported     GAD7:       9/11/2019     9:16 AM 11/5/2019     9:24 AM 1/24/2020     9:10 AM 8/12/2021     9:06 AM 1/30/2024    10:30 AM 1/29/2025     6:53 PM 2/19/2025     5:36 PM   LAURIE-7 SCORE   Total Score    0 (minimal anxiety) 2 (minimal anxiety) 0 (minimal anxiety) 6 (mild anxiety)   Total Score 0 0 0 0 2 0  6        Patient-reported        Reason for Visit/Presenting Concern:  Bipolar Disorder    Current Stressors / Issues:    update: Pt reports that her mood has been good. She reports that her speech becomes more rapid, forgets words when she's stressed. [facility] provided psycho-education on how anxiety can effect our executive function. [facility] explored pts follow up on medication versus medication. She's reached out to her PCP to schedule an appt for hormone testing and to  discuss anxiety medication. C and pt explored patterns, triggers etc. C and pt explored skills to manage her anxiety. She identifies being outdoors, walking, reading. C explored ways to do do more of these. She is friends with some of her neighbors and may could consider starting a book club with them. She's been trying to find ways to connect with others and/or the causes that are important to her. Her work offers her that. They have a lot of financial stress but their daughter will start  which will save them a lot of money. Pt had questions about dx of bipolar 2 disorder. [facility] provided psycho-education to explain what that is. [facility] also sent articles for pt to read.   Stresses: state of the world, financial stress, worries for the future  Appetite: unremarkable  Sleep: ongoing insomnia  Therapy: TBD  SHERRIE: No  Preg: No      Therapeutic Interventions:  Cognitive Behavioral Therapy (CBT): Identified and challenged maladaptive patterns of behavior and thinking that interfere with patients life  Psycho-education: Provided psycho-education about patient's behavioral health condition and symptoms.    Response to treatment interventions:   Patient was receptive to interventions utilized.  Patient was engaged in the therapy process.       Progress on Treatment Objective(s) / Homework:   Unable to assess-first appt      Medication Review:   No changes to current psychiatric medication(s)     Medication Compliance:   Yes     Chemical Use Review:  Substance Use: Chemical use reviewed, no active concerns identified      Tobacco Use: No current tobacco use.       Assessment: Current Emotional / Mental Status (status of significant symptoms):    Risk status (Self / Other harm or suicidal ideation)   Patient denies a history of suicidal ideation, suicide attempts, self-injurious behavior, homicidal ideation, homicidal behavior, and and other safety concerns   Patient denies current fears or concerns for personal  safety.   Patient denies current or recent suicidal ideation or behaviors.   Patient denies current or recent homicidal ideation or behaviors.   Patient denies current or recent self injurious behavior or ideation.   Patient denies other safety concerns.   Recommended that patient call 911 or go to the local ED should there be a change in any of these risk factors      ASSESSMENT:   Mental Status:     Appearance:   Appropriate    Eye Contact:   Good    Psychomotor Behavior: Normal    Attitude:   Cooperative    Orientation:   All   Speech Rate / Production: Normal    Volume:   Normal    Mood:    Normal   Affect:    Appropriate    Thought Content:  Clear    Thought Form:  Coherent  Logical    Insight:    Good          Diagnoses:      Bipolar 2 disorder (H)  Generalized anxiety disorder    Collateral Reports Completed:   Collaborated with [facility] team        Plan: (Homework, other):   Patient was provided No indications of CD issues  Patient was given information about behavioral services and encouraged to schedule a follow up appointment with the clinic [facility] in 2 weeks.         ALBERT Fajardo, [facility]     _____________________________________________________________________________________________________________________________________                                              Individual Treatment Plan    Patient's Name: Ana Cleveland   YOB: 1980  Date of Creation: 02/20/2025  Date Treatment Plan Last Reviewed/Revised: pending    DSM5 Diagnoses: 296.89 Bipolar II Disorder Hypomanic  Psychosocial / Contextual Factors: Trauma History, Relationship Concerns, and Occupational Issues  PROMIS (reviewed every 90 days):   The following assessments were completed by patient for this visit:  TBD      Referral / Collaboration:  The following referral(s) will be initiated: TBD .    Anticipated number of session for this episode of care:  3-6 sessions  Anticipation frequency of session:  Biweekly  Anticipated Duration of each session: 16-37 minutes  Treatment plan will be reviewed in 90 days or when goals have been changed.       MeasurableTreatment Goal(s) related to diagnosis / functional impairment(s)  Goal 1: Patient will  fewer incidents of hypomania   Pt will know she's made progress when her relationships improve    Status: New - Date: 02/20/2025      Intervention(s)  [facility] will Cognitive Behavioral Therapy (CBT): Identify and challenge maladaptive patterns of behavior and thinking that interfere with patient's life  Psycho-education: Provide psycho-education about patient's behavioral health condition and symptoms.       Patient has reviewed and agreed to the above plan.     Written by  Li Barker [facility], [facility]

## 2025-03-11 ENCOUNTER — VIRTUAL VISIT (OUTPATIENT)
Dept: SURGERY | Facility: CLINIC | Age: 45
End: 2025-03-11
Payer: COMMERCIAL

## 2025-03-11 ENCOUNTER — VIRTUAL VISIT (OUTPATIENT)
Dept: BEHAVIORAL HEALTH | Facility: CLINIC | Age: 45
End: 2025-03-11
Payer: COMMERCIAL

## 2025-03-11 VITALS — HEIGHT: 64 IN | BODY MASS INDEX: 24.07 KG/M2 | WEIGHT: 141 LBS

## 2025-03-11 DIAGNOSIS — Z86.39 HISTORY OF OBESITY: Primary | ICD-10-CM

## 2025-03-11 DIAGNOSIS — F31.81 BIPOLAR 2 DISORDER (H): Primary | ICD-10-CM

## 2025-03-11 DIAGNOSIS — F41.1 GENERALIZED ANXIETY DISORDER: ICD-10-CM

## 2025-03-11 PROCEDURE — 90832 PSYTX W PT 30 MINUTES: CPT | Mod: 95 | Performed by: SOCIAL WORKER

## 2025-03-11 PROCEDURE — 98000 SYNCH AUDIO-VIDEO NEW SF 15: CPT | Performed by: PHYSICIAN ASSISTANT

## 2025-03-11 PROCEDURE — G2211 COMPLEX E/M VISIT ADD ON: HCPCS | Performed by: PHYSICIAN ASSISTANT

## 2025-03-11 RX ORDER — ONDANSETRON 4 MG/1
TABLET, ORALLY DISINTEGRATING ORAL
COMMUNITY
Start: 2024-09-25

## 2025-03-11 RX ORDER — CHOLECALCIFEROL (VITAMIN D3) 50 MCG
TABLET ORAL
COMMUNITY
Start: 2025-02-27

## 2025-03-11 RX ORDER — CEPHALEXIN 500 MG/1
CAPSULE ORAL
COMMUNITY
Start: 2024-09-25

## 2025-03-11 RX ORDER — METFORMIN HYDROCHLORIDE 500 MG/1
TABLET, EXTENDED RELEASE ORAL
Qty: 270 TABLET | Refills: 1 | Status: SHIPPED | OUTPATIENT
Start: 2025-03-11 | End: 2025-09-28

## 2025-03-11 RX ORDER — CHLORAL HYDRATE 500 MG
CAPSULE ORAL
COMMUNITY
Start: 2025-02-27

## 2025-03-11 RX ORDER — MAGNESIUM 200 MG
TABLET ORAL
COMMUNITY
Start: 2025-02-27

## 2025-03-11 NOTE — ASSESSMENT & PLAN NOTE
Get 1 more month supply of semaglutide then taper off 1 mg semaglutide by increasing time between dose duration.  Start metformin.  Creatinine within normal limits. Risks/ benefits and possible side effects were discussed and questions were answered. Written information was given.  BMP in 3 months    WEIGHT METRICS:  Body mass index is 24.2 kg/m .   Current Weight: 141 lb (64 kg)  Last Visits Weight: 169 lb (76.7 kg)  Initial Weight (lbs): 168 lbs  Initial BMI: 28.3  Cumulative weight loss (lbs): 27  Weight Loss Percentage: 16.07%

## 2025-03-11 NOTE — PATIENT INSTRUCTIONS
Nice to talk with you today. Below is the plan discussed.-  BENNY Trujillo      Pt Instructions:  Continue Semaglutide.  Increase time between doses 10 days-14-days-18 days then stop.      Start Metformin  Directions: Take 1 tablet by mouth daily with a meal for 1 week, then increase to 2 tablets daily with a meal, if tolerating can increase to 3 tablets daily with a meal or at bedtime.     Labs ordered.  Please call [phone] to set up a lab appt in 3 mo    Follow up:    Call [phone] to schedule next visit in July/Oct    METFORMIN    Metformin is a medication that is used to assist with lowering blood sugars in patients that have Pre-Diabetes or Diabetes. It has also been found to help with modest weight loss.    Metformin helps to lower blood sugars by lowering the amount of sugar (glucose) your liver produces that would otherwise cause your blood sugar to increase. It also makes your body respond better to insulin (the product that lowers your blood sugar).     Emerging research reported in journals suggests metformin may also lead to weight loss as a result of changes in the appetite centers of the brain, shifts in the gut microbiome, and reversal of metabolic changes that usually happen with age.    Starting instructions:  Take 1 tablet by mouth daily with a meal for 1 week, then increase to 2 tablets daily with a meal, if tolerating can increase to 3 tablets daily with a meal or at bedtime.     Side-effects. Metformin is generally well tolerated. The main side-effects we see are: Diarrhea, nausea and stomach upset - this tends to subside over time as your body gets used to the medication. Taking this medications with food will lower these side effects.    For any questions or concerns please send a [employer] message to our team or call our weight management call center at [phone].     In order to get refills of this or any medication we prescribe you must be seen in the medical weight mgmt clinic  every 6 months.       Options for continuing GLP1/GIP agonist in 2025:  Wegovy or Zepbound pens out of pocket cost (>$1000/month cash price without savings card)   Zepbound  Cost of any dose with savings card (link below to sign up): $650/month with card at any pharmacy   https://www.enrollment.zepb[employer]ur/enroll/checkEnrollment - Medicaid, Medicare or other government insurances cannot use savings cards  Wegovy   Cost of any dose with savings card (link below to sign up): $650/month with card at any pharmacy   https://www.[employer]/coverage-and-savings/save-on-wegovy.html - Medicaid, Medicare or other government insurances cannot use savings cards  Cost for any dose through [facility]: $499/month - This is [facility]'s mail order pharmacy   Terms and Conditions of Use  [facility]

## 2025-03-26 NOTE — PATIENT INSTRUCTIONS
If you have any questions regarding your visit, Please contact your care team.    To Schedule an Appointment 24/7  Call: [phone]  Women s Health  TELEPHONE NUMBER   Tom Enciso M.D.    [place]-Medical Assistant    Helena Quinteros-Surgery Scheduler  [facility]- Tuesday-[place]                   7:30 a.m.-3:30 p.m.  Wednesday-[place]             8:00 a.m.-4:30 p.m.  Thursday-[place]     8:00 a.m.-4:00 p.m.  Friday-[place]                       7:30 a.m.-1:00 p.m. [facility]  [address] [place] [address].  [place] [address] [phone] Phone  [phone] Fax    Imaging Scheduling all locations  [phone]      [facility]  [address]  [place], [place] [address] [phone]    [facility]  [address] [facility] [address] [place] [address] [phone] ask for Women's Clinic     **Surgeries** Our Surgery Schedulers will contact you to schedule. If you do not receive a call within 3 business days, please call [phone].    Urgent Care locations:  [facility] Monday-Friday                 10 am - 8 pm  Saturday and Sunday        9 am - 5 pm   [phone]   If you need a medication refill, please contact your pharmacy. Please allow 3 business days for your refill to be completed.  As always, Thank you for trusting us with your healthcare needs!  If you have any questions regarding your visit, Please contact your care team.    [facility]: [phone]    To Schedule an Appointment 24/7  Call: [phone]    see additional instructions from your care team below

## 2025-04-01 ENCOUNTER — OFFICE VISIT (OUTPATIENT)
Dept: OBGYN | Facility: CLINIC | Age: 45
End: 2025-04-01
Payer: COMMERCIAL

## 2025-04-01 ENCOUNTER — TELEPHONE (OUTPATIENT)
Dept: OBGYN | Facility: CLINIC | Age: 45
End: 2025-04-01

## 2025-04-01 VITALS
OXYGEN SATURATION: 97 % | DIASTOLIC BLOOD PRESSURE: 71 MMHG | WEIGHT: 143 LBS | HEART RATE: 94 BPM | BODY MASS INDEX: 24.55 KG/M2 | SYSTOLIC BLOOD PRESSURE: 102 MMHG

## 2025-04-01 DIAGNOSIS — R45.86 MOOD SWINGS: Primary | ICD-10-CM

## 2025-04-01 DIAGNOSIS — L65.9 THINNING HAIR: ICD-10-CM

## 2025-04-01 DIAGNOSIS — F41.1 GAD (GENERALIZED ANXIETY DISORDER): ICD-10-CM

## 2025-04-01 LAB
EST. AVERAGE GLUCOSE BLD GHB EST-MCNC: 91 MG/DL
HBA1C MFR BLD: 4.8 % (ref 0–5.6)
TSH SERPL DL<=0.005 MIU/L-ACNC: 2.95 UIU/ML (ref 0.3–4.2)

## 2025-04-01 PROCEDURE — 99204 OFFICE O/P NEW MOD 45 MIN: CPT | Performed by: OBSTETRICS & GYNECOLOGY

## 2025-04-01 PROCEDURE — 36415 COLL VENOUS BLD VENIPUNCTURE: CPT | Performed by: OBSTETRICS & GYNECOLOGY

## 2025-04-01 PROCEDURE — 84443 ASSAY THYROID STIM HORMONE: CPT | Performed by: OBSTETRICS & GYNECOLOGY

## 2025-04-01 PROCEDURE — 83036 HEMOGLOBIN GLYCOSYLATED A1C: CPT | Performed by: OBSTETRICS & GYNECOLOGY

## 2025-04-01 ASSESSMENT — ANXIETY QUESTIONNAIRES
IF YOU CHECKED OFF ANY PROBLEMS ON THIS QUESTIONNAIRE, HOW DIFFICULT HAVE THESE PROBLEMS MADE IT FOR YOU TO DO YOUR WORK, TAKE CARE OF THINGS AT HOME, OR GET ALONG WITH OTHER PEOPLE: SOMEWHAT DIFFICULT
GAD7 TOTAL SCORE: 15
1. FEELING NERVOUS, ANXIOUS, OR ON EDGE: NEARLY EVERY DAY
6. BECOMING EASILY ANNOYED OR IRRITABLE: MORE THAN HALF THE DAYS
7. FEELING AFRAID AS IF SOMETHING AWFUL MIGHT HAPPEN: SEVERAL DAYS
5. BEING SO RESTLESS THAT IT IS HARD TO SIT STILL: NEARLY EVERY DAY
GAD7 TOTAL SCORE: 15
3. WORRYING TOO MUCH ABOUT DIFFERENT THINGS: NEARLY EVERY DAY
2. NOT BEING ABLE TO STOP OR CONTROL WORRYING: SEVERAL DAYS

## 2025-04-01 ASSESSMENT — PATIENT HEALTH QUESTIONNAIRE - PHQ9
SUM OF ALL RESPONSES TO PHQ QUESTIONS 1-9: 10
5. POOR APPETITE OR OVEREATING: MORE THAN HALF THE DAYS
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
## 2025-04-01 NOTE — TELEPHONE ENCOUNTER
[facility] Call Center    Phone Message    May a detailed message be left on voicemail: yes     Reason for Call: Other: . Pt is returning a call from , secure chat instructed to send a TE, please call pt back at [phone], she will be available anytime with her phone on her, thank you!    Action Taken: Message routed to:  Other: obgyn    Travel Screening: Not Applicable     Date of Service:

## 2025-06-02 ENCOUNTER — LAB (OUTPATIENT)
Dept: LAB | Facility: CLINIC | Age: 45
End: 2025-06-02
Payer: COMMERCIAL

## 2025-06-02 DIAGNOSIS — Z86.39 HISTORY OF OBESITY: ICD-10-CM

## 2025-06-02 LAB
ANION GAP SERPL CALCULATED.3IONS-SCNC: 10 MMOL/L (ref 7–15)
BUN SERPL-MCNC: 13.1 MG/DL (ref 6–20)
CALCIUM SERPL-MCNC: 9.2 MG/DL (ref 8.8–10.4)
CHLORIDE SERPL-SCNC: 105 MMOL/L (ref 98–107)
CREAT SERPL-MCNC: 0.64 MG/DL (ref 0.51–0.95)
EGFRCR SERPLBLD CKD-EPI 2021: >90 ML/MIN/1.73M2
GLUCOSE SERPL-MCNC: 94 MG/DL (ref 70–99)
HCO3 SERPL-SCNC: 25 MMOL/L (ref 22–29)
POTASSIUM SERPL-SCNC: 4.7 MMOL/L (ref 3.4–5.3)
SODIUM SERPL-SCNC: 140 MMOL/L (ref 135–145)

## 2025-06-02 PROCEDURE — 80048 BASIC METABOLIC PNL TOTAL CA: CPT

## 2025-06-02 PROCEDURE — 36415 COLL VENOUS BLD VENIPUNCTURE: CPT

## 2025-06-03 ENCOUNTER — RESULTS FOLLOW-UP (OUTPATIENT)
Dept: SURGERY | Facility: CLINIC | Age: 45
End: 2025-06-03

## 2025-07-17 ENCOUNTER — TELEPHONE (OUTPATIENT)
Dept: SURGERY | Facility: CLINIC | Age: 45
End: 2025-07-17
Payer: COMMERCIAL

## 2025-07-17 NOTE — TELEPHONE ENCOUNTER
Patient Returning Call    Reason for call:  missed call    Information relayed to patient:  pt had missed call, appt 7/18 with Radha    Patient has additional questions:  No      Could we send this information to you in [place] or would you prefer to receive a phone call?:   Patient would prefer a phone call   Okay to leave a detailed message?: Yes at Cell number on file:    Telephone Information:   Mobile [phone]

## 2025-08-25 ASSESSMENT — ANXIETY QUESTIONNAIRES
7. FEELING AFRAID AS IF SOMETHING AWFUL MIGHT HAPPEN: SEVERAL DAYS
IF YOU CHECKED OFF ANY PROBLEMS ON THIS QUESTIONNAIRE, HOW DIFFICULT HAVE THESE PROBLEMS MADE IT FOR YOU TO DO YOUR WORK, TAKE CARE OF THINGS AT HOME, OR GET ALONG WITH OTHER PEOPLE: SOMEWHAT DIFFICULT
8. IF YOU CHECKED OFF ANY PROBLEMS, HOW DIFFICULT HAVE THESE MADE IT FOR YOU TO DO YOUR WORK, TAKE CARE OF THINGS AT HOME, OR GET ALONG WITH OTHER PEOPLE?: SOMEWHAT DIFFICULT
6. BECOMING EASILY ANNOYED OR IRRITABLE: SEVERAL DAYS
5. BEING SO RESTLESS THAT IT IS HARD TO SIT STILL: SEVERAL DAYS
2. NOT BEING ABLE TO STOP OR CONTROL WORRYING: SEVERAL DAYS
GAD7 TOTAL SCORE: 8
7. FEELING AFRAID AS IF SOMETHING AWFUL MIGHT HAPPEN: SEVERAL DAYS
GAD7 TOTAL SCORE: 8
GAD7 TOTAL SCORE: 8
1. FEELING NERVOUS, ANXIOUS, OR ON EDGE: MORE THAN HALF THE DAYS
3. WORRYING TOO MUCH ABOUT DIFFERENT THINGS: SEVERAL DAYS
4. TROUBLE RELAXING: SEVERAL DAYS

## 2025-08-25 ASSESSMENT — PATIENT HEALTH QUESTIONNAIRE - PHQ9
10. IF YOU CHECKED OFF ANY PROBLEMS, HOW DIFFICULT HAVE THESE PROBLEMS MADE IT FOR YOU TO DO YOUR WORK, TAKE CARE OF THINGS AT HOME, OR GET ALONG WITH OTHER PEOPLE: NOT DIFFICULT AT ALL
SUM OF ALL RESPONSES TO PHQ QUESTIONS 1-9: 0
SUM OF ALL RESPONSES TO PHQ QUESTIONS 1-9: 0

## 2025-08-26 ENCOUNTER — VIRTUAL VISIT (OUTPATIENT)
Dept: PSYCHIATRY | Facility: CLINIC | Age: 45
End: 2025-08-26
Attending: OBSTETRICS & GYNECOLOGY
Payer: COMMERCIAL

## 2025-08-26 DIAGNOSIS — F41.1 GAD (GENERALIZED ANXIETY DISORDER): Primary | ICD-10-CM

## 2025-08-26 DIAGNOSIS — F32.81 PMDD (PREMENSTRUAL DYSPHORIC DISORDER): ICD-10-CM

## 2025-08-26 ASSESSMENT — PAIN SCALES - GENERAL: PAINLEVEL_OUTOF10: NO PAIN (0)

## (undated) DEVICE — Device

## (undated) DEVICE — PREP CHLORAPREP 26ML TINTED ORANGE  260815

## (undated) DEVICE — SU VICRYL 0 CT-1 36" J346H

## (undated) DEVICE — LINEN GOWN X4 5410

## (undated) DEVICE — SPONGE LAP 18X18" 1515

## (undated) DEVICE — SUCTION VACUUM CANISTER STANDARD W/LID&CAPS 003987-901

## (undated) DEVICE — GLOVE ESTEEM POWDER FREE SMT 6.5  2D72PT65

## (undated) DEVICE — SU MONOCRYL 0 CT-1 36" Y346H

## (undated) DEVICE — DECANTER TRANSFER DEVICE 2008S

## (undated) DEVICE — SOL WATER IRRIG 1000ML BOTTLE 2F7114

## (undated) DEVICE — SUCTION CANISTER MEDIVAC LINER 1500ML W/LID 65651-515

## (undated) DEVICE — SU VICRYL 4-0 PS-2 18" UND J496G

## (undated) DEVICE — GLOVE PROTEXIS W/NEU-THERA 6.5  2D73TE65

## (undated) DEVICE — SOL NACL 0.9% IRRIG 1000ML BOTTLE 2F7124

## (undated) DEVICE — CATH TRAY FOLEY SURESTEP 16FR WDRAIN BAG STLK LATEX A300316A

## (undated) DEVICE — PAD CHUX UNDERPAD 30X36" P3036C

## (undated) DEVICE — PACK C-SECTION LF PL15OTA83B

## (undated) DEVICE — STOCKING SLEEVE COMPRESSION CALF LG

## (undated) DEVICE — GLOVE PROTEXIS BLUE W/NEU-THERA 7.0  2D73EB70

## (undated) DEVICE — STRAP KNEE/BODY 31143004

## (undated) DEVICE — SPECIMEN TRAP VACUUM SUCTION SAFETOUCH 003853-902

## (undated) DEVICE — TUBING VACUUM COLLECTION 6FT 23116

## (undated) DEVICE — SOL WATER IRRIG 1000ML BOTTLE 07139-09

## (undated) DEVICE — SUCTION CANNULA UTERINE 12MM CVD  21555

## (undated) DEVICE — SOL NACL 0.9% IRRIG 1000ML BOTTLE 07138-09

## (undated) DEVICE — BASIN SET MAJOR

## (undated) DEVICE — LINEN TOWEL PACK X5 5464

## (undated) RX ORDER — FENTANYL CITRATE 50 UG/ML
INJECTION, SOLUTION INTRAMUSCULAR; INTRAVENOUS
Status: DISPENSED
Start: 2019-12-13

## (undated) RX ORDER — ONDANSETRON 2 MG/ML
INJECTION INTRAMUSCULAR; INTRAVENOUS
Status: DISPENSED
Start: 2019-12-13

## (undated) RX ORDER — MORPHINE SULFATE 1 MG/ML
INJECTION, SOLUTION EPIDURAL; INTRATHECAL; INTRAVENOUS
Status: DISPENSED
Start: 2019-12-13

## (undated) RX ORDER — PROPOFOL 10 MG/ML
INJECTION, EMULSION INTRAVENOUS
Status: DISPENSED
Start: 2019-12-13

## (undated) RX ORDER — FENTANYL CITRATE 50 UG/ML
INJECTION, SOLUTION INTRAMUSCULAR; INTRAVENOUS
Status: DISPENSED
Start: 2018-11-30

## (undated) RX ORDER — HYDROMORPHONE HYDROCHLORIDE 1 MG/ML
INJECTION, SOLUTION INTRAMUSCULAR; INTRAVENOUS; SUBCUTANEOUS
Status: DISPENSED
Start: 2019-12-13

## (undated) RX ORDER — KETOROLAC TROMETHAMINE 30 MG/ML
INJECTION, SOLUTION INTRAMUSCULAR; INTRAVENOUS
Status: DISPENSED
Start: 2019-12-13

## (undated) RX ORDER — PROPOFOL 10 MG/ML
INJECTION, EMULSION INTRAVENOUS
Status: DISPENSED
Start: 2018-11-30

## (undated) RX ORDER — LIDOCAINE HYDROCHLORIDE 20 MG/ML
INJECTION, SOLUTION EPIDURAL; INFILTRATION; INTRACAUDAL; PERINEURAL
Status: DISPENSED
Start: 2018-11-30

## (undated) RX ORDER — ACETAMINOPHEN 325 MG/1
TABLET ORAL
Status: DISPENSED
Start: 2019-12-13

## (undated) RX ORDER — DOXYCYCLINE 100 MG/10ML
INJECTION, POWDER, LYOPHILIZED, FOR SOLUTION INTRAVENOUS
Status: DISPENSED
Start: 2018-11-30

## (undated) RX ORDER — PHENYLEPHRINE HCL IN 0.9% NACL 1 MG/10 ML
SYRINGE (ML) INTRAVENOUS
Status: DISPENSED
Start: 2019-12-13

## (undated) RX ORDER — IBUPROFEN 600 MG/1
TABLET, FILM COATED ORAL
Status: DISPENSED
Start: 2018-11-30